# Patient Record
Sex: FEMALE | Race: BLACK OR AFRICAN AMERICAN | NOT HISPANIC OR LATINO | Employment: FULL TIME | URBAN - METROPOLITAN AREA
[De-identification: names, ages, dates, MRNs, and addresses within clinical notes are randomized per-mention and may not be internally consistent; named-entity substitution may affect disease eponyms.]

---

## 2017-04-14 ENCOUNTER — DOCUMENTATION (OUTPATIENT)
Dept: OCCUPATIONAL THERAPY | Facility: HOSPITAL | Age: 52
End: 2017-04-14

## 2017-04-14 DIAGNOSIS — I97.2 POST-MASTECTOMY LYMPHEDEMA SYNDROME: Primary | ICD-10-CM

## 2017-04-14 NOTE — THERAPY DISCHARGE NOTE
"Outpatient Occupational Therapy Lymphedema Treatment Note/Discharge Summary       Patient Name: Santos Witt  : 1965  MRN: [id number]  Today's Date: 2017      Visit Date: 2017    Patient Active Problem List   Diagnosis   • Malignant neoplasm of breast (female)        Past Medical History:   Diagnosis Date   • Anemia     History of anemia   • Cancer     Breast cancer, T2NM0   • Heart murmur         Past Surgical History:   Procedure Laterality Date   • BREAST CYST EXCISION Left     Fibroid cyst left nipple   • HYSTERECTOMY  2011   • KNEE ARTHROSCOPY Left    • PELVIC LAPAROSCOPY      cysts removed         Visit Dx:      ICD-10-CM ICD-9-CM   1. 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  -RE     STG 1 Progress Not Met  -RE     STG 2 Patient will tolerate wearing compression during all waking hours to maintain/decrease her edema.   -RE     STG 2 Progress Not Met  -RE     STG 3 Pt will demonstate understanding of use of compression sleeve for exercise and air travel.   -RE     STG 3 Progress Not Met  -RE     Long Term Goals    LTG 1 Patient independent and compliant with compression garments and night compression  as indicated for self-management of edema flare ups.   -RE     LTG 1 Progress Not Met  -RE     LTG 2 Patient demonstrate proper awareness of What is Lymphedema and \"Do's & Don'ts\" for improved prevention, management, care of symptoms and ease of transition to self-care of condition.   -RE     LTG 2 Progress Not Met  -RE       User Key  (r) = Recorded By, (t) = Taken By, (c) = Cosigned By    Initials Name Provider Type    KAMRAN Myers Occupational Therapist                      Time Calculation:                       OP OT Discharge Summary  Date " of Discharge: 04/14/17  Reason for Discharge: other (comment) (Patient did not return for follow-up.)  Outcomes Achieved: Other  Discharge Destination: Home without follow-up      KAMRAN Jessica  4/14/2017

## 2017-07-07 ENCOUNTER — LAB (OUTPATIENT)
Dept: LAB | Facility: HOSPITAL | Age: 52
End: 2017-07-07

## 2017-07-07 ENCOUNTER — OFFICE VISIT (OUTPATIENT)
Dept: ONCOLOGY | Facility: CLINIC | Age: 52
End: 2017-07-07

## 2017-07-07 VITALS
HEIGHT: 63 IN | TEMPERATURE: 98.5 F | HEART RATE: 76 BPM | BODY MASS INDEX: 27.75 KG/M2 | OXYGEN SATURATION: 98 % | SYSTOLIC BLOOD PRESSURE: 120 MMHG | WEIGHT: 156.6 LBS | RESPIRATION RATE: 14 BRPM | DIASTOLIC BLOOD PRESSURE: 80 MMHG

## 2017-07-07 DIAGNOSIS — C50.412 MALIGNANT NEOPLASM OF UPPER-OUTER QUADRANT OF LEFT FEMALE BREAST (HCC): Primary | ICD-10-CM

## 2017-07-07 LAB
ALBUMIN SERPL-MCNC: 4.3 G/DL (ref 3.5–5.2)
ALBUMIN/GLOB SERPL: 1.2 G/DL (ref 1.1–2.4)
ALP SERPL-CCNC: 65 U/L (ref 38–116)
ALT SERPL W P-5'-P-CCNC: 16 U/L (ref 0–33)
ANION GAP SERPL CALCULATED.3IONS-SCNC: 14.6 MMOL/L
AST SERPL-CCNC: 23 U/L (ref 0–32)
BASOPHILS # BLD AUTO: 0.01 10*3/MM3 (ref 0–0.1)
BASOPHILS NFR BLD AUTO: 0.2 % (ref 0–1.1)
BILIRUB SERPL-MCNC: 0.5 MG/DL (ref 0.1–1.2)
BUN BLD-MCNC: 25 MG/DL (ref 6–20)
BUN/CREAT SERPL: 28.4 (ref 7.3–30)
CALCIUM SPEC-SCNC: 9.1 MG/DL (ref 8.5–10.2)
CHLORIDE SERPL-SCNC: 100 MMOL/L (ref 98–107)
CO2 SERPL-SCNC: 25.4 MMOL/L (ref 22–29)
CREAT BLD-MCNC: 0.88 MG/DL (ref 0.6–1.1)
DEPRECATED RDW RBC AUTO: 44.9 FL (ref 37–49)
EOSINOPHIL # BLD AUTO: 0.05 10*3/MM3 (ref 0–0.36)
EOSINOPHIL NFR BLD AUTO: 1.2 % (ref 1–5)
ERYTHROCYTE [DISTWIDTH] IN BLOOD BY AUTOMATED COUNT: 13.9 % (ref 11.7–14.5)
GFR SERPL CREATININE-BSD FRML MDRD: 82 ML/MIN/1.73
GLOBULIN UR ELPH-MCNC: 3.6 GM/DL (ref 1.8–3.5)
GLUCOSE BLD-MCNC: 83 MG/DL (ref 74–124)
HCT VFR BLD AUTO: 38.3 % (ref 34–45)
HGB BLD-MCNC: 12.5 G/DL (ref 11.5–14.9)
HOLD SPECIMEN: NORMAL
IMM GRANULOCYTES # BLD: 0.01 10*3/MM3 (ref 0–0.03)
IMM GRANULOCYTES NFR BLD: 0.2 % (ref 0–0.5)
LYMPHOCYTES # BLD AUTO: 1.29 10*3/MM3 (ref 1–3.5)
LYMPHOCYTES NFR BLD AUTO: 31.2 % (ref 20–49)
MCH RBC QN AUTO: 28.6 PG (ref 27–33)
MCHC RBC AUTO-ENTMCNC: 32.6 G/DL (ref 32–35)
MCV RBC AUTO: 87.6 FL (ref 83–97)
MONOCYTES # BLD AUTO: 0.47 10*3/MM3 (ref 0.25–0.8)
MONOCYTES NFR BLD AUTO: 11.4 % (ref 4–12)
NEUTROPHILS # BLD AUTO: 2.3 10*3/MM3 (ref 1.5–7)
NEUTROPHILS NFR BLD AUTO: 55.8 % (ref 39–75)
NRBC BLD MANUAL-RTO: 0 /100 WBC (ref 0–0)
PLATELET # BLD AUTO: 242 10*3/MM3 (ref 150–375)
PMV BLD AUTO: 9 FL (ref 8.9–12.1)
POTASSIUM BLD-SCNC: 4.2 MMOL/L (ref 3.5–4.7)
PROT SERPL-MCNC: 7.9 G/DL (ref 6.3–8)
RBC # BLD AUTO: 4.37 10*6/MM3 (ref 3.9–5)
SODIUM BLD-SCNC: 140 MMOL/L (ref 134–145)
WBC NRBC COR # BLD: 4.13 10*3/MM3 (ref 4–10)

## 2017-07-07 PROCEDURE — 99213 OFFICE O/P EST LOW 20 MIN: CPT | Performed by: INTERNAL MEDICINE

## 2017-07-07 PROCEDURE — 85025 COMPLETE CBC W/AUTO DIFF WBC: CPT

## 2017-07-07 PROCEDURE — 80053 COMPREHEN METABOLIC PANEL: CPT

## 2017-07-07 PROCEDURE — 36415 COLL VENOUS BLD VENIPUNCTURE: CPT

## 2017-07-07 RX ORDER — LEVOTHYROXINE SODIUM 0.05 MG/1
TABLET ORAL
Refills: 3 | COMMUNITY
Start: 2017-05-25 | End: 2018-03-23 | Stop reason: SDUPTHER

## 2017-07-07 RX ORDER — VITAMIN B COMPLEX
CAPSULE ORAL DAILY
COMMUNITY

## 2017-07-07 NOTE — PROGRESS NOTES
Subjective      REASONS FOR FOLLOWUP:   1. T2N1M0, ER/TX negative, HER2 positive breast cancer.   2. BRCA 1 and 2 negative.   3. Adriamycin/Cytoxan followed by Taxol and Herceptin, started on 11/08/2013.   4. Syncopal episode after cycle #3 with stable echo, chemotherapy continued.   5. FUO after cycle 4 of chemotherapy associated with severe mouth pain, marked elevation of liver function tests and ferritin to 44,000, stable echocardiogram, stable cardiac function, ?etiology, and query drug reaction versus viral infection.   6. Taxol weekly initiated on 02/13/2014. Close followup of LFTs and cardiac function.   7. Echocardiogram 03/05/2014 stable at 63%. Treatment continued with Taxol and Herceptin.   8. Neuropathy grade 2. Taxol decreased 15% on 03/20/2014.   9. Q.3 week Herceptin started 05/23/2014 with plans for radiation consult.   10. Itching with Herceptin. Hydrocortisone and Pepcid added to care plan.   11. Patient seen 07/25/2014 with mild reduction in ejection fraction noted. Fo llowup echocardiogram scheduled prior to next visit month, Herceptin continued with hydrocortisone and Pepcid pre-medications.   12. Further drop in the ejection fraction to 53%. Because of some dizziness and near syncope, treatment with Herceptin held with planned followup in 1 month with repeat echocardiogram. MRI done for headaches was negative.   13. EF up to 64%. Herceptin resumed. Previous echocardiogram felt to be suboptimal due to lack of contrast.   14. Syncopal episode 12/19/2014 with a negative cardiac evaluation. Neurology consult recommended.   15. MRI done for headaches. Negative in August 2014.   16. CT of the abdomen done at [facility] in 10/2014 shows a liver lesion, which was indeterminate. They recommended MRI. We have compared to a previous MRI in 2014 and established this is hemangioma.     HISTORY OF PRESENT ILLNESS:     History of Present Illness patient is a 51-year-old   American female with an ER/DE negative HER-2 positive breast cancer node positive T2 N1 M0 treated with Adriamycin and Cytoxan followed by Taxol Herceptin was completed in late  .  He is here for routine follow-up and overall she is doing well she was diagnosed with asthma and  is on an inhaler with good improvement.  She has mild hot flashes but has mood swings and menopausal symptoms as reported into menopause with chemotherapy.  She has no unusual aches or pains but otherwise is doing well and working full-time.  She is not interested in reconstruction at this point.  We did discuss doing extended genetic testing because of her family history we referred her for the extended testing but she has not done that yet She did have a screening colonoscopy and this was negative.  DEXA scan was normal in April of this year.  She is still very anxious about recurrence which is understandable but getting better with time.    PAST MEDICAL HIS TORY: Fairly unremarkable except for anemia in the past when she was having heavy periods and some arthritis in her knees. She has had a heart murmur since childhood and took antibiotic prophylaxis for dental procedures and then she was told she did not need to do this any longer.     SURGICAL HISTORY: Fibroadenoma from the left breast in . Uterine fibroids removed laparoscopically in . Left knee arthroscopy in  and hysterectomy in . Ovaries were left in place.     GYN HISTORY: Menarche age 13.  0. She obviously stopped menstruating at the time of her hysterectomy in  and is having hot flashes.     HEMATOLOGIC/ONCOLOGIC HISTORY:    The  patient initially seen on 10/18/2013, a 48-year-old  female who works as a  at the [place] who has a long history of fibrocystic breast disease with multiple ultrasounds and mammograms in the past to evaluate c ysts but after her most recent mammogram, because of some  abnormalities, an ultrasound was recommended on 07/24/2013 at [facility]. The targeted ultrasound showed 3 circumscribed solid masses in the left breast at the 2 o' clock position, axillar y tail of the breast. The appearance was suggestive of intramammary lymph nodes without a definite fatty hilum which was worrisome for tumor involvement. One of the 3 masses had actually increased in size compared to 2 of the other lesions which were un changed from 2010 suggesting benign etiology. Because of the greater than 20% interval increase in size of one of these nodules since the last mammogram, ultrasound guided biopsy was recommended.   Ultrasound-guided biopsy was done on 08/01/2013 and this biopsy showed findings highly suspicious for lymph node involvement by ductal carcinoma and the patient then went to see Dr. Witt who sent her for another biopsy on 08/19/2013 and a 1.5 cm mass was seen at the 3 o' clock position of the left breast in luan tion to the 3 abnormal axillary masses which were felt to be lymph nodes. This area was biopsied on 08/19/2013 and the path report showed invasive ductal carcinoma and DCIS grade 3 with the largest focus of invasive cancer being 1 cm and DCIS was interme d iate to high grade. The left axillary lymph node was core biopsied and confirmed metastatic carcinoma. ER/OK were negative, HER2 was 3+ positive. This led to an MRI of the breast 08/27/2013 which showed 3 cm mass in the lower outer quadrant of the left breast and additional clump enhancement in the middle and anterior third of the left breast at the 3 o' clock axis suspicious for in situ carcinoma and 3 rounded masses measured up to 9 mm in greatest dimension are noted consistent with lymph nodes. One ha d a clip from the recent biopsy. Right breast showed stippled foci of variable enhancement with no dominant or clumped findings, prominent right axillary lymph nodes were noted and right axillary lymph node biopsy  was done. This was nondiagnostic.   The patient underwent bone scan on 09/09/2013 which was unremarkable except for some increased activity at T9 and the right part of L3 which was again felt to be nonspecific but plain films were recommended. CT scan of the chest, abdomen and pelvis was done which was also unremarkable except for prominent intramammary nodes in the upper outer left breast and several prominent axillary lymph nodes bilaterally one of which has been biopsied on the right. Appearance raised the concern of possible metastatic to both axillae but there were no other sites of involvement. Bone density dated 09/20/2013 was normal. Patient then went for surgery on 09/25/2013 at which time she underwent right mastectomy which was unremarkable and left total mastectomy with axillary dissection which showed a 2.9 cm grade 3 infiltrating ductal carcinoma with associated DCIS, margins were uninvolved. Three of 21 lymph nodes showed metastatic disease making this a pT2N1.   The patient has done well from surgery but has had a lot of prob lems with anxiety and hot flashes but otherwise has done well. She went to see Dr. Cedeno for recommendations and then came to see us for further discussion of treatment options. In addition, the patient had BRCA testing which thankfully was negative.   T he patient had a MUGA scan, which showed an ejection fraction of 50% and no other abnormalities. Her plain films of the abnormal spots on her bone scan were negative. She had port placed and is ready to start treatment. 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   When she had CT scans of the chest, abdomen and pelvis, they were unremarkable except for hemangioma in the liver and prominent intramammary nodes in the upper outer quadrant  of the left breast with several prominent axillary nodes bilaterally.   The patient started dose-dense AC with fairly good tolerance except for some mucositis after cycle 4. She had a febrile illness that required hospitalization from 01/02/2014 - 01/15/2014 with a fever of unknown origin. Multiple viral cultures were negative including CMV, EBV, herpes, respiratory panel and hepatiti s profile was negative. Mouth multiple blood and urine cultures and throat cultures were negative except for Haemophilus parainfluenza for which a course of amoxicillin was given. During the hospitalization the patient spiked fevers continuously and then developed rapid elevation of her liver tests with a ferritin which went up as high as 44,000. The patient defervesced finally and the liver profile started coming down. Echo with bubble echo during her stay was normal with ejection fraction of 60%. The p atchrissy was discharged home with plans to resume chemotherapy cautiously once her numbers stabilized.   The patient was seen on 01/22/2014 feeling much better. She still is not very active, feeling fatigued and deconditioned from lying in bed for almost 2 we eks. The decision was made because of borderline blood pressure of 94/70 and some mild tachycardia to have her be more active and exercise for another week and reevaluate to start Herceptin on 01/30/2014 with Taxol to be added after 2 doses of Herceptin i f she is tolerating the treatment well and her liver functions are normal. A bone marrow biopsy was also done during her hospital stay and no signs of hemophagocytosis or dysplasia although there was some aberrant myeloid antigen of uncertain significanc e which has been seen with myelodysplasia. She was also seen briefly by Dr. Fatmata Tolbert of Rheumatology while in the hospital for low positive ARTEMIO of 1:160 and anti-SSA antibody greater than 8 but Dr. Tolbert felt this was unlikely to be rheumatologica l disease because 4 doses  of Cytoxan should not have precipitated this.   The patient initiated weekly Herceptin for 2 weeks with no significant problems. Taxol weekly initiated on 02/13/2014 with close followup of LFTs and cardiac function.   The patient was seen on 03/06/2014 with echo showing EF of 63%. We will continue Taxol and Herceptin weekly and watch her LFTs closely. Neurontin causes dizziness and she is not taking it. We will hold off on Effexor for the time being.   Patient required Neupogen for 3 doses because of an ANC of 1.06 with dose 4 and Neupogen x3 is added for each dose.   The patient is seen 03/20/2014 doing fairly well. Taxol dose decreased to 15% because of neuropathy. Neupogen continued.   The patient was seen on 05/23/2014 doing well. Herceptin continued with plans to check an echocardiogram before her next treatment in 3 weeks.   MRI of the liver confirmed hemangioma. Echocardiogram is stable at 59%. Herceptin continued at 3-week intervals with the addition of Pepcid and Solu-Cortef 50 mg and the patient is almost certainly going to agree to radiation for her 3 node status, especially since the pathologist at [facility] thought there was extranodal extension.   The patient was seen 07/25/2014. Echocardiogram results noted, slightly reduced from previous, the patient proceeding through radiation therapy of left chest wall. Followup echocardiogram scheduled after her Herceptin therapy 07/25/2014.   Patient was seen on 08/14/2014 with EF of 53%. I discussed th e case with Dr. Bebe Fernandez and did not use the definitive contrast and this may explain the differences, but based on the fact that she had some near syncope on one occasion this weekend and the fact that she has had radiation to the left chest area with a significant skin burn, we will hold Herceptin today and bring her back in 4 weeks, but check her LFTs and ferritin again today.   The patient was 11/14/2014 doing well with ejection fraction of 63%  and echocardiogram was reviewed. Herceptin will be continued until mid-January.   The patient is seen 2014. She had a syncopal episode 2014. ER workup was negative and neurological evaluation was ordered. Bone scan ordered for right hip pain. If this is negative she will have her port removed in J anuary and she is planning to delay reconstruction for a year.   The patient was evaluated for some pain in her back with a bone scan which was essentially unremarkable except for some degenerative changes at L3, and T6-7-8. Because we were concerned, we ordered an MRI of the thoracic and lumbar spine which was benign with just DJD. No further followup is needed at this time with regard to scans.   Patient seen on 2015, doing well; occasional migraine-type headache persists. Port has been removed. She is thinking about reconstruction sometime at the end of the year.   CT of the abdomen done at [facility] in 10/2014 shows a liver lesion, which was indeterminate. They recommended MRI. We have compared to a previous MRI in  and established this is hemangioma.   Patient seen on 2015 having had CT scans of the chest and abdo men at [facility] for some abdominal discomfort that showed a 3 cm lesion in the dome of the liver, which they were worried about and recommended an MRI, but we reviewed the scans and also compared to the previous MRI of the liver d one in 2014 in October and confirmed that this indeed was a hemangioma that has been stable since . An echo was also done which showed an ejection fraction of 60% and stable.       SOCIAL HISTORY: Single. . She does not smoke. Drinks very little. No drug history.     FAMILY HISTORY: Both of her parents are in their 60s. Mother had kidney cancer and had a partial kidney resection. Brother  of head trauma, another is healthy. She has two sisters who are healthy. She has 2 paternal  "aunts with breast cancer; one is alive in her 70s, the other  at 48 of breast cancer. A paternal uncle with kidney cancer. Paternal grandfather with colon cancer in his 60s. Multiple paternal cousins with cancer inc luding leukemia and kidney cancer. No other history of breast or ovarian cancer in the family.     Review of Systems   Constitutional: Positive for fatigue.   Psychiatric/Behavioral: The patient is nervous/anxious.       A comprehensive 14 point review of systems was performed and was negative except as mentioned.    Medications:  The current medication list was reviewed in the EMR    ALLERGIES:    Allergies   Allergen Reactions   • Amoxicillin Nausea And Vomiting   • Ciprofloxacin    • Sulfa Antibiotics        Objective      Vitals:    17 1011   BP: 120/80   Pulse: 76   Resp: 14   Temp: 98.5 °F (36.9 °C)   TempSrc: Oral   SpO2: 98%   Weight: 156 lb 9.6 oz (71 kg)   Height: 63.27\" (160.7 cm)  Comment: new ht   PainSc: 0-No pain     Current Status 2017   ECOG score 0       Physical Exam    GENERAL:  Well-developed, well-nourished in no acute distress.   SKIN:  Warm, dry without rashes, purpura or petechiae.  HEAD:  Normocephalic.  EYES:  Pupils equal, round and reactive to light.  EOMs intact.  Conjunctivae normal.  EARS:  Hearing intact.  NOSE:  Septum midline.  No excoriations or nasal discharge.  MOUTH:  Tongue is well-papillated; no stomatitis or ulcers.  Lips normal.  THROAT:  Oropharynx without lesions or exudates.  NECK:  Supple with good range of motion; no thyromegaly or masses, no JVD.  LYMPHATICS:  No cervical, supraclavicular, axillary or inguinal adenopathy.  CHEST:  Lungs clear to percussion and auscultation. Good airflow.  BREASTS: Bilateral chest wall is benign with no axillary adenopathy  CARDIAC:  Regular rate and rhythm without murmurs, rubs or gallops. Normal S1,S2.  ABDOMEN:  Soft, nontender with no organomegaly or masses.  EXTREMITIES:  No clubbing, cyanosis or " edema.  NEUROLOGICAL:  Cranial Nerves II-XII grossly intact.  No focal neurological deficits.  PSYCHIATRIC:  Normal affect and mood.        RECENT LABS:  Hematology WBC   Date Value Ref Range Status   07/07/2017 4.13 4.00 - 10.00 10*3/mm3 Final     RBC   Date Value Ref Range Status   07/07/2017 4.37 3.90 - 5.00 10*6/mm3 Final     Hemoglobin   Date Value Ref Range Status   07/07/2017 12.5 11.5 - 14.9 g/dL Final     Hematocrit   Date Value Ref Range Status   07/07/2017 38.3 34.0 - 45.0 % Final     Platelets   Date Value Ref Range Status   07/07/2017 242 150 - 375 10*3/mm3 Final      Assessment/Plan   1.  T2 N1 ER/IA negative HER-2 positive breast cancer treated with Adriamycin Cytoxan Taxol Herceptin and bilateral mastectomies plus radiation  2.  BRCA negative  3.  Hemangioma of the liver  4.  family history of malignancy  5.  Mild chronic anemia      Plan return in 6 months for follow-up she will call for any other problems in the interim.              7/7/2017      CC:

## 2017-08-14 DIAGNOSIS — C50.412 BILATERAL MALIGNANT NEOPLASM OF UPPER OUTER QUADRANT OF BREAST IN FEMALE (HCC): Primary | ICD-10-CM

## 2017-08-14 DIAGNOSIS — C50.411 BILATERAL MALIGNANT NEOPLASM OF UPPER OUTER QUADRANT OF BREAST IN FEMALE (HCC): Primary | ICD-10-CM

## 2017-10-20 ENCOUNTER — TELEPHONE (OUTPATIENT)
Dept: RADIATION ONCOLOGY | Facility: HOSPITAL | Age: 52
End: 2017-10-20

## 2017-10-20 NOTE — TELEPHONE ENCOUNTER
Ms iWtt calls to say she was treated at [facility] with radiation but her doctor there is now gone and she is calling Dr Longoria because she was seen in consultation by her in 2014. She now complains of tingling in her chest and questions if this is normal. I advised her since we did not treat her we do not have records and she would need to either see her PMD for evaluation or contact [facility] where she was treated and be evaluated by one of the other radiation doctors. She voiced understanding and agreed.

## 2017-12-22 ENCOUNTER — LAB (OUTPATIENT)
Dept: LAB | Facility: HOSPITAL | Age: 52
End: 2017-12-22

## 2017-12-22 ENCOUNTER — OFFICE VISIT (OUTPATIENT)
Dept: ONCOLOGY | Facility: CLINIC | Age: 52
End: 2017-12-22

## 2017-12-22 VITALS
HEART RATE: 72 BPM | TEMPERATURE: 98 F | WEIGHT: 162.4 LBS | SYSTOLIC BLOOD PRESSURE: 132 MMHG | BODY MASS INDEX: 28.77 KG/M2 | OXYGEN SATURATION: 99 % | RESPIRATION RATE: 12 BRPM | DIASTOLIC BLOOD PRESSURE: 80 MMHG | HEIGHT: 63 IN

## 2017-12-22 DIAGNOSIS — Z17.1 MALIGNANT NEOPLASM OF UPPER-OUTER QUADRANT OF BOTH BREASTS IN FEMALE, ESTROGEN RECEPTOR NEGATIVE (HCC): Primary | ICD-10-CM

## 2017-12-22 DIAGNOSIS — C50.412 BILATERAL MALIGNANT NEOPLASM OF UPPER OUTER QUADRANT OF BREAST IN FEMALE, UNSPECIFIED ESTROGEN RECEPTOR STATUS (HCC): Primary | ICD-10-CM

## 2017-12-22 DIAGNOSIS — C50.412 MALIGNANT NEOPLASM OF UPPER-OUTER QUADRANT OF LEFT FEMALE BREAST (HCC): ICD-10-CM

## 2017-12-22 DIAGNOSIS — C50.411 BILATERAL MALIGNANT NEOPLASM OF UPPER OUTER QUADRANT OF BREAST IN FEMALE, UNSPECIFIED ESTROGEN RECEPTOR STATUS (HCC): Primary | ICD-10-CM

## 2017-12-22 DIAGNOSIS — C50.411 MALIGNANT NEOPLASM OF UPPER-OUTER QUADRANT OF BOTH BREASTS IN FEMALE, ESTROGEN RECEPTOR NEGATIVE (HCC): Primary | ICD-10-CM

## 2017-12-22 DIAGNOSIS — C50.412 MALIGNANT NEOPLASM OF UPPER-OUTER QUADRANT OF BOTH BREASTS IN FEMALE, ESTROGEN RECEPTOR NEGATIVE (HCC): Primary | ICD-10-CM

## 2017-12-22 LAB
ALBUMIN SERPL-MCNC: 4.1 G/DL (ref 3.5–5.2)
ALBUMIN/GLOB SERPL: 1.2 G/DL (ref 1.1–2.4)
ALP SERPL-CCNC: 62 U/L (ref 38–116)
ALT SERPL W P-5'-P-CCNC: 15 U/L (ref 0–33)
ANION GAP SERPL CALCULATED.3IONS-SCNC: 9 MMOL/L
AST SERPL-CCNC: 22 U/L (ref 0–32)
BASOPHILS # BLD AUTO: 0.01 10*3/MM3 (ref 0–0.1)
BASOPHILS NFR BLD AUTO: 0.2 % (ref 0–1.1)
BILIRUB SERPL-MCNC: 0.4 MG/DL (ref 0.1–1.2)
BUN BLD-MCNC: 21 MG/DL (ref 6–20)
BUN/CREAT SERPL: 25.3 (ref 7.3–30)
CALCIUM SPEC-SCNC: 9.1 MG/DL (ref 8.5–10.2)
CHLORIDE SERPL-SCNC: 103 MMOL/L (ref 98–107)
CO2 SERPL-SCNC: 28 MMOL/L (ref 22–29)
CREAT BLD-MCNC: 0.83 MG/DL (ref 0.6–1.1)
DEPRECATED RDW RBC AUTO: 43.6 FL (ref 37–49)
EOSINOPHIL # BLD AUTO: 0.09 10*3/MM3 (ref 0–0.36)
EOSINOPHIL NFR BLD AUTO: 1.9 % (ref 1–5)
ERYTHROCYTE [DISTWIDTH] IN BLOOD BY AUTOMATED COUNT: 13.4 % (ref 11.7–14.5)
GFR SERPL CREATININE-BSD FRML MDRD: 87 ML/MIN/1.73
GLOBULIN UR ELPH-MCNC: 3.4 GM/DL (ref 1.8–3.5)
GLUCOSE BLD-MCNC: 80 MG/DL (ref 74–124)
HCT VFR BLD AUTO: 36.2 % (ref 34–45)
HGB BLD-MCNC: 11.7 G/DL (ref 11.5–14.9)
HOLD SPECIMEN: NORMAL
IMM GRANULOCYTES # BLD: 0.01 10*3/MM3 (ref 0–0.03)
IMM GRANULOCYTES NFR BLD: 0.2 % (ref 0–0.5)
LYMPHOCYTES # BLD AUTO: 1.52 10*3/MM3 (ref 1–3.5)
LYMPHOCYTES NFR BLD AUTO: 32 % (ref 20–49)
MCH RBC QN AUTO: 28.5 PG (ref 27–33)
MCHC RBC AUTO-ENTMCNC: 32.3 G/DL (ref 32–35)
MCV RBC AUTO: 88.1 FL (ref 83–97)
MONOCYTES # BLD AUTO: 0.5 10*3/MM3 (ref 0.25–0.8)
MONOCYTES NFR BLD AUTO: 10.5 % (ref 4–12)
NEUTROPHILS # BLD AUTO: 2.62 10*3/MM3 (ref 1.5–7)
NEUTROPHILS NFR BLD AUTO: 55.2 % (ref 39–75)
NRBC BLD MANUAL-RTO: 0 /100 WBC (ref 0–0)
PLATELET # BLD AUTO: 215 10*3/MM3 (ref 150–375)
PMV BLD AUTO: 9.5 FL (ref 8.9–12.1)
POTASSIUM BLD-SCNC: 4.3 MMOL/L (ref 3.5–4.7)
PROT SERPL-MCNC: 7.5 G/DL (ref 6.3–8)
RBC # BLD AUTO: 4.11 10*6/MM3 (ref 3.9–5)
SODIUM BLD-SCNC: 140 MMOL/L (ref 134–145)
WBC NRBC COR # BLD: 4.75 10*3/MM3 (ref 4–10)

## 2017-12-22 PROCEDURE — 99213 OFFICE O/P EST LOW 20 MIN: CPT | Performed by: INTERNAL MEDICINE

## 2017-12-22 PROCEDURE — 36415 COLL VENOUS BLD VENIPUNCTURE: CPT | Performed by: INTERNAL MEDICINE

## 2017-12-22 PROCEDURE — 80053 COMPREHEN METABOLIC PANEL: CPT | Performed by: INTERNAL MEDICINE

## 2017-12-22 PROCEDURE — 85025 COMPLETE CBC W/AUTO DIFF WBC: CPT | Performed by: INTERNAL MEDICINE

## 2017-12-22 RX ORDER — LEVOTHYROXINE SODIUM 0.07 MG/1
75 TABLET ORAL
COMMUNITY
Start: 2017-11-28 | End: 2018-06-08

## 2017-12-22 NOTE — PROGRESS NOTES
Subjective      REASONS FOR FOLLOWUP:   1. T2N1M0, ER/GA negative, HER2 positive breast cancer.   2. BRCA 1 and 2 negative.   3. Adriamycin/Cytoxan followed by Taxol and Herceptin, started on 11/08/2013.   4. Syncopal episode after cycle #3 with stable echo, chemotherapy continued.   5. FUO after cycle 4 of chemotherapy associated with severe mouth pain, marked elevation of liver function tests and ferritin to 44,000, stable echocardiogram, stable cardiac function, ?etiology, and query drug reaction versus viral infection.   6. Taxol weekly initiated on 02/13/2014. Close followup of LFTs and cardiac function.   7. Echocardiogram 03/05/2014 stable at 63%. Treatment continued with Taxol and Herceptin.   8. Neuropathy grade 2. Taxol decreased 15% on 03/20/2014.   9. Q.3 week Herceptin started 05/23/2014 with plans for radiation consult.   10. Itching with Herceptin. Hydrocortisone and Pepcid added to care plan.   11. Patient seen 07/25/2014 with mild reduction in ejection fraction noted. Fo llowup echocardiogram scheduled prior to next visit month, Herceptin continued with hydrocortisone and Pepcid pre-medications.   12. Further drop in the ejection fraction to 53%. Because of some dizziness and near syncope, treatment with Herceptin held with planned followup in 1 month with repeat echocardiogram. MRI done for headaches was negative.   13. EF up to 64%. Herceptin resumed. Previous echocardiogram felt to be suboptimal due to lack of contrast.   14. Syncopal episode 12/19/2014 with a negative cardiac evaluation. Neurology consult recommended.   15. MRI done for headaches. Negative in August 2014.   16. CT of the abdomen done at [facility] in 10/2014 shows a liver lesion, which was indeterminate. They recommended MRI. We have compared to a previous MRI in 2014 and established this is hemangioma.       History of Present Illness patient is a 52-year-old lady with a node positive HER-2 positive ER  negative breast cancer 4 years from completion of adjuvant chemotherapy.  She comes in today for follow-up and overall she is doing well but is having a lot of troubles with anxiety and tearfulness because 2 of her friends  recently from metastatic cancer in one of them actually had breast cancer.  She is struggling with the whole issue of her mortality and I tried to reassure her that she is doing well 4 years out but she is still tearful.  She stopped her Effexor because it causes some unusual reactions.  She states she has tried other medicines without success.  I suggested a trial of Zyprexa and told her to talk to her family doctor about this  She is up-to-date with colonoscopies.    She did have another syncopal episode last week and they're working her up through her family doctor's office.  This does not sound like a cancer related problem but more a neuro or cardiac issue      Active Ambulatory Problems     Diagnosis Date Noted   • Bilateral malignant neoplasm of upper outer quadrant of breast in female 04/15/2016     Resolved Ambulatory Problems     Diagnosis Date Noted   • No Resolved Ambulatory Problems     Past Medical History:   Diagnosis Date   • Anemia    • Cancer    • Heart murmur      SURGICAL HISTORY: Fibroadenoma from the left breast in . Uterine fibroids removed laparoscopically in . Left knee arthroscopy in  and hysterectomy in . Ovaries were left in place.     GYN HISTORY: Menarche age 13.  0. She obviously stopped menstruating at the time of her hysterectomy in  and is having hot flashes.     HEMATOLOGIC/ONCOLOGIC HISTORY:    The  patient initially seen on 10/18/2013, a 48-year-old  female who works as a  at the [place] who has a long history of fibrocystic breast disease with multiple ultrasounds and mammograms in the past to evaluate c ysts but after her most recent mammogram, because of some abnormalities, an  ultrasound was recommended on 07/24/2013 at [facility]. The targeted ultrasound showed 3 circumscribed solid masses in the left breast at the 2 o' clock position, axillar y tail of the breast. The appearance was suggestive of intramammary lymph nodes without a definite fatty hilum which was worrisome for tumor involvement. One of the 3 masses had actually increased in size compared to 2 of the other lesions which were un changed from 2010 suggesting benign etiology. Because of the greater than 20% interval increase in size of one of these nodules since the last mammogram, ultrasound guided biopsy was recommended.   Ultrasound-guided biopsy was done on 08/01/2013 and this biopsy showed findings highly suspicious for lymph node involvement by ductal carcinoma and the patient then went to see Dr. Witt who sent her for another biopsy on 08/19/2013 and a 1.5 cm mass was seen at the 3 o' clock position of the left breast in luan tion to the 3 abnormal axillary masses which were felt to be lymph nodes. This area was biopsied on 08/19/2013 and the path report showed invasive ductal carcinoma and DCIS grade 3 with the largest focus of invasive cancer being 1 cm and DCIS was interme d iate to high grade. The left axillary lymph node was core biopsied and confirmed metastatic carcinoma. ER/AL were negative, HER2 was 3+ positive. This led to an MRI of the breast 08/27/2013 which showed 3 cm mass in the lower outer quadrant of the left breast and additional clump enhancement in the middle and anterior third of the left breast at the 3 o' clock axis suspicious for in situ carcinoma and 3 rounded masses measured up to 9 mm in greatest dimension are noted consistent with lymph nodes. One ha d a clip from the recent biopsy. Right breast showed stippled foci of variable enhancement with no dominant or clumped findings, prominent right axillary lymph nodes were noted and right axillary lymph node biopsy was done. This was  nondiagnostic.   The patient underwent bone scan on 09/09/2013 which was unremarkable except for some increased activity at T9 and the right part of L3 which was again felt to be nonspecific but plain films were recommended. CT scan of the chest, abdomen and pelvis was done which was also unremarkable except for prominent intramammary nodes in the upper outer left breast and several prominent axillary lymph nodes bilaterally one of which has been biopsied on the right. Appearance raised the concern of possible metastatic to both axillae but there were no other sites of involvement. Bone density dated 09/20/2013 was normal. Patient then went for surgery on 09/25/2013 at which time she underwent right mastectomy which was unremarkable and left total mastectomy with axillary dissection which showed a 2.9 cm grade 3 infiltrating ductal carcinoma with associated DCIS, margins were uninvolved. Three of 21 lymph nodes showed metastatic disease making this a pT2N1.   The patient has done well from surgery but has had a lot of prob lems with anxiety and hot flashes but otherwise has done well. She went to see Dr. Cedeno for recommendations and then came to see us for further discussion of treatment options. In addition, the patient had BRCA testing which thankfully was negative.   T he patient had a MUGA scan, which showed an ejection fraction of 50% and no other abnormalities. Her plain films of the abnormal spots on her bone scan were negative. She had port placed and is ready to start treatment. 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   When she had CT scans of the chest, abdomen and pelvis, they were unremarkable except for hemangioma in the liver and prominent intramammary nodes in the upper outer quadrant of the left breast  with several prominent axillary nodes bilaterally.   The patient started dose-dense AC with fairly good tolerance except for some mucositis after cycle 4. She had a febrile illness that required hospitalization from 01/02/2014 - 01/15/2014 with a fever of unknown origin. Multiple viral cultures were negative including CMV, EBV, herpes, respiratory panel and hepatiti s profile was negative. Mouth multiple blood and urine cultures and throat cultures were negative except for Haemophilus parainfluenza for which a course of amoxicillin was given. During the hospitalization the patient spiked fevers continuously and then developed rapid elevation of her liver tests with a ferritin which went up as high as 44,000. The patient defervesced finally and the liver profile started coming down. Echo with bubble echo during her stay was normal with ejection fraction of 60%. The p atchrissy was discharged home with plans to resume chemotherapy cautiously once her numbers stabilized.   The patient was seen on 01/22/2014 feeling much better. She still is not very active, feeling fatigued and deconditioned from lying in bed for almost 2 we eks. The decision was made because of borderline blood pressure of 94/70 and some mild tachycardia to have her be more active and exercise for another week and reevaluate to start Herceptin on 01/30/2014 with Taxol to be added after 2 doses of Herceptin i f she is tolerating the treatment well and her liver functions are normal. A bone marrow biopsy was also done during her hospital stay and no signs of hemophagocytosis or dysplasia although there was some aberrant myeloid antigen of uncertain significanc e which has been seen with myelodysplasia. She was also seen briefly by Dr. Fatmata Tolbert of Rheumatology while in the hospital for low positive ARTEMIO of 1:160 and anti-SSA antibody greater than 8 but Dr. Tolbert felt this was unlikely to be rheumatologica l disease because 4 doses of Cytoxan should  not have precipitated this.   The patient initiated weekly Herceptin for 2 weeks with no significant problems. Taxol weekly initiated on 02/13/2014 with close followup of LFTs and cardiac function.   The patient was seen on 03/06/2014 with echo showing EF of 63%. We will continue Taxol and Herceptin weekly and watch her LFTs closely. Neurontin causes dizziness and she is not taking it. We will hold off on Effexor for the time being.   Patient required Neupogen for 3 doses because of an ANC of 1.06 with dose 4 and Neupogen x3 is added for each dose.   The patient is seen 03/20/2014 doing fairly well. Taxol dose decreased to 15% because of neuropathy. Neupogen continued.   The patient was seen on 05/23/2014 doing well. Herceptin continued with plans to check an echocardiogram before her next treatment in 3 weeks.   MRI of the liver confirmed hemangioma. Echocardiogram is stable at 59%. Herceptin continued at 3-week intervals with the addition of Pepcid and Solu-Cortef 50 mg and the patient is almost certainly going to agree to radiation for her 3 node status, especially since the pathologist at [facility] thought there was extranodal extension.   The patient was seen 07/25/2014. Echocardiogram results noted, slightly reduced from previous, the patient proceeding through radiation therapy of left chest wall. Followup echocardiogram scheduled after her Herceptin therapy 07/25/2014.   Patient was seen on 08/14/2014 with EF of 53%. I discussed th e case with Dr. Bebe Fernandez and did not use the definitive contrast and this may explain the differences, but based on the fact that she had some near syncope on one occasion this weekend and the fact that she has had radiation to the left chest area with a significant skin burn, we will hold Herceptin today and bring her back in 4 weeks, but check her LFTs and ferritin again today.   The patient was 11/14/2014 doing well with ejection fraction of 63% and  echocardiogram was reviewed. Herceptin will be continued until mid-January.   The patient is seen 12/29/2014. She had a syncopal episode 12/19/2014. ER workup was negative and neurological evaluation was ordered. Bone scan ordered for right hip pain. If this is negative she will have her port removed in J anuary and she is planning to delay reconstruction for a year.   The patient was evaluated for some pain in her back with a bone scan which was essentially unremarkable except for some degenerative changes at L3, and T6-7-8. Because we were concerned, we ordered an MRI of the thoracic and lumbar spine which was benign with just DJD. No further followup is needed at this time with regard to scans.   Patient seen on 05/08/2015, doing well; occasional migraine-type headache persists. Port has been removed. She is thinking about reconstruction sometime at the end of the year.   CT of the abdomen done at [facility] in 10/2014 shows a liver lesion, which was indeterminate. They recommended MRI. We have compared to a previous MRI in 2014 and established this is hemangioma.   Patient seen on 12/18/2015 having had CT scans of the chest and abdo men at [facility] for some abdominal discomfort that showed a 3 cm lesion in the dome of the liver, which they were worried about and recommended an MRI, but we reviewed the scans and also compared to the previous MRI of the liver d one in 2014 in October and confirmed that this indeed was a hemangioma that has been stable since 2013. An echo was also done which showed an ejection fraction of 60% and stable.   7/17   patient is a 51-year-old  female with an ER/MT negative HER-2 positive breast cancer node positive T2 N1 M0 treated with Adriamycin and Cytoxan followed by Taxol Herceptin was completed in late 2014 .  He is here for routine follow-up and overall she is doing well she was diagnosed with asthma and  is on an  "inhaler with good improvement.  She has mild hot flashes but has mood swings and menopausal symptoms as reported into menopause with chemotherapy.  She has no unusual aches or pains but otherwise is doing well and working full-time.  She is not interested in reconstruction at this point.  We did discuss doing extended genetic testing because of her family history we referred her for the extended testing but she has not done that yet She did have a screening colonoscopy and this was negative.  DEXA scan was normal in April of this year.  She is still very anxious about recurrence which is understandable but getting better with time.        SOCIAL HISTORY: Single. . She does not smoke. Drinks very little. No drug history.     FAMILY HISTORY: Both of her parents are in their 60s. Mother had kidney cancer and had a partial kidney resection. Brother  of head trauma, another is healthy. She has two sisters who are healthy. She has 2 paternal aunts with breast cancer; one is alive in her 70s, the other  at 48 of breast cancer. A paternal uncle with kidney cancer. Paternal grandfather with colon cancer in his 60s. Multiple paternal cousins with cancer inc luding leukemia and kidney cancer. No other history of breast or ovarian cancer in the family.     Review of Systems   Constitutional: Positive for fatigue.   Psychiatric/Behavioral: The patient is nervous/anxious.       A comprehensive 14 point review of systems was performed and was negative except as mentioned.    Medications:  The current medication list was reviewed in the EMR    ALLERGIES:    Allergies   Allergen Reactions   • Amoxicillin Nausea And Vomiting   • Ciprofloxacin    • Sulfa Antibiotics        Objective      Vitals:    17 1000   BP: 132/80   Pulse: 72   Resp: 12   Temp: 98 °F (36.7 °C)   TempSrc: Oral   SpO2: 99%   Weight: 73.7 kg (162 lb 6.4 oz)   Height: 161 cm (63.39\")  Comment: new height with out shoes   PainSc: 0-No pain "     Current Status 12/22/2017   ECOG score 0       Physical Exam    GENERAL:  Well-developed, well-nourished in no acute distress.   SKIN:  Warm, dry without rashes, purpura or petechiae.  HEAD:  Normocephalic.  EYES:  Pupils equal, round and reactive to light.  EOMs intact.  Conjunctivae normal.  EARS:  Hearing intact.  NOSE:  Septum midline.  No excoriations or nasal discharge.  MOUTH:  Tongue is well-papillated; no stomatitis or ulcers.  Lips normal.  THROAT:  Oropharynx without lesions or exudates.  NECK:  Supple with good range of motion; no thyromegaly or masses, no JVD.  LYMPHATICS:  No cervical, supraclavicular, axillary or inguinal adenopathy.  CHEST:  Lungs clear to percussion and auscultation. Good airflow.  BREASTS: Bilateral chest wall is benign with no axillary adenopathy  CARDIAC:  Regular rate and rhythm without murmurs, rubs or gallops. Normal S1,S2.  ABDOMEN:  Soft, nontender with no organomegaly or masses.  EXTREMITIES:  No clubbing, cyanosis or edema.  NEUROLOGICAL:  Cranial Nerves II-XII grossly intact.  No focal neurological deficits.  PSYCHIATRIC:  Normal affect and mood.        RECENT LABS:  Hematology WBC   Date Value Ref Range Status   12/22/2017 4.75 4.00 - 10.00 10*3/mm3 Final     RBC   Date Value Ref Range Status   12/22/2017 4.11 3.90 - 5.00 10*6/mm3 Final     Hemoglobin   Date Value Ref Range Status   12/22/2017 11.7 11.5 - 14.9 g/dL Final     Hematocrit   Date Value Ref Range Status   12/22/2017 36.2 34.0 - 45.0 % Final     Platelets   Date Value Ref Range Status   12/22/2017 215 150 - 375 10*3/mm3 Final      Assessment/Plan   1.  T2 N1 ER/IA negative HER-2 positive breast cancer treated with Adriamycin Cytoxan Taxol Herceptin and bilateral mastectomies plus radiation 4.0Years from diagnosis  2.  BRCA negative  3.  Hemangioma of the liver  4.  family history of malignancy  5.  Mild chronic anemia  6.  Anxiety intolerant of Effexor ?  Zyprexa 2.5 mg     Plan return in 6 months for  follow-up she will call for any other problems in the interim.              12/22/2017      CC:

## 2018-01-11 ENCOUNTER — OFFICE VISIT (OUTPATIENT)
Dept: ORTHOPEDIC SURGERY | Facility: CLINIC | Age: 53
End: 2018-01-11

## 2018-01-11 VITALS — TEMPERATURE: 98.4 F | BODY MASS INDEX: 28.53 KG/M2 | WEIGHT: 161 LBS | HEIGHT: 63 IN

## 2018-01-11 DIAGNOSIS — S80.02XA CONTUSION OF LEFT KNEE, INITIAL ENCOUNTER: ICD-10-CM

## 2018-01-11 DIAGNOSIS — M25.562 ACUTE PAIN OF LEFT KNEE: Primary | ICD-10-CM

## 2018-01-11 PROBLEM — G89.29 CHRONIC PAIN OF LEFT KNEE: Status: ACTIVE | Noted: 2018-01-11

## 2018-01-11 PROCEDURE — 20610 DRAIN/INJ JOINT/BURSA W/O US: CPT | Performed by: ORTHOPAEDIC SURGERY

## 2018-01-11 PROCEDURE — 99204 OFFICE O/P NEW MOD 45 MIN: CPT | Performed by: ORTHOPAEDIC SURGERY

## 2018-01-11 PROCEDURE — 73562 X-RAY EXAM OF KNEE 3: CPT | Performed by: ORTHOPAEDIC SURGERY

## 2018-01-11 RX ORDER — METHYLPREDNISOLONE ACETATE 80 MG/ML
80 INJECTION, SUSPENSION INTRA-ARTICULAR; INTRALESIONAL; INTRAMUSCULAR; SOFT TISSUE
Status: COMPLETED | OUTPATIENT
Start: 2018-01-11 | End: 2018-01-11

## 2018-01-11 RX ORDER — LIDOCAINE HYDROCHLORIDE 10 MG/ML
2 INJECTION, SOLUTION EPIDURAL; INFILTRATION; INTRACAUDAL; PERINEURAL
Status: COMPLETED | OUTPATIENT
Start: 2018-01-11 | End: 2018-01-11

## 2018-01-11 RX ORDER — BUPIVACAINE HYDROCHLORIDE 5 MG/ML
2 INJECTION, SOLUTION EPIDURAL; INTRACAUDAL
Status: COMPLETED | OUTPATIENT
Start: 2018-01-11 | End: 2018-01-11

## 2018-01-11 RX ADMIN — BUPIVACAINE HYDROCHLORIDE 2 ML: 5 INJECTION, SOLUTION EPIDURAL; INTRACAUDAL at 12:17

## 2018-01-11 RX ADMIN — METHYLPREDNISOLONE ACETATE 80 MG: 80 INJECTION, SUSPENSION INTRA-ARTICULAR; INTRALESIONAL; INTRAMUSCULAR; SOFT TISSUE at 12:17

## 2018-01-11 RX ADMIN — LIDOCAINE HYDROCHLORIDE 2 ML: 10 INJECTION, SOLUTION EPIDURAL; INFILTRATION; INTRACAUDAL; PERINEURAL at 12:17

## 2018-01-11 NOTE — PROGRESS NOTES
Patient Name: Santos Witt   YOB: 1965  Referring Primary Care Physician: Sheridan Richardson MD      Chief Complaint:    Chief Complaint   Patient presents with   • Left Knee - Establish Care, Pain        Subjective:    HPI:   Santos Witt is a pleasant 52 y.o. year old who presents today for evaluation of   Chief Complaint   Patient presents with   • Left Knee - Establish Care, Pain   [place] slipping on water and landed on left knee and elbow.  The left knee swelled and is hurting and hard to walk .  Got worse later.  Iced and taking  daily.  Left knee scope about 12 yrs ago but no problems since.      This problem is new to this examiner.     Medications:   Home Medications:  Current Outpatient Prescriptions on File Prior to Visit   Medication Sig   • ALBUTEROL IN Inhale as needed.   • ascorbic acid (VITAMIN C) 500 MG tablet Take  by mouth.   • B Complex Vitamins (VITAMIN B COMPLEX) capsule capsule Take  by mouth Daily.   • Calcium-Magnesium-Vitamin D 400-166.7-133.3 MG-MG-UNIT tablet Take  by mouth.   • Cholecalciferol (VITAMIN D3) 5000 UNITS capsule capsule Take 5,000 Units by mouth daily.   • Evening Primrose Oil 1000 MG capsule Take  by mouth daily.   • fluticasone (FLONASE) 50 MCG/ACT nasal spray 1 spray into each nostril Daily. Administer 2 sprays in each nostril for each dose.    • Fluticasone Furoate-Vilanterol (BREO ELLIPTA IN) Inhale daily.   • GERMANIUM PO Take  by mouth Daily.   • Glucosamine-Chondroitin--400-166 MG tablet Take  by mouth.   • LECITHIN PO Take 1,200 mg by mouth Daily.   • levothyroxine (SYNTHROID, LEVOTHROID) 50 MCG tablet TAKE 1 TABLET BY MOUTH DAILY   • levothyroxine (SYNTHROID, LEVOTHROID) 75 MCG tablet Take 75 mcg by mouth.   • MAGNESIUM PO Take  by mouth Daily.   • montelukast (SINGULAIR) 10 MG tablet Take 10 mg by mouth every night.   • Multiple Vitamin (MULTI-VITAMINS PO) Take  by mouth daily.   • NON FORMULARY Daily. 2%  Iodine drops.   • Probiotic capsule Take  by mouth 2 (two) times a day.   • SELENIUM PO Take  by mouth Daily.     No current facility-administered medications on file prior to visit.      Current Medications:  Scheduled Meds:  Continuous Infusions:  No current facility-administered medications for this visit.   PRN Meds:.    I have reviewed the patient's medical history in detail and updated the computerized patient record.  Review and summarization of old records includes:    Past Medical History:   Diagnosis Date   • Anemia     History of anemia   • Cancer     Breast cancer, T2NM0   • Heart murmur         Past Surgical History:   Procedure Laterality Date   • BREAST CYST EXCISION Left 1982    Fibroid cyst left nipple   • HYSTERECTOMY  06/2011   • KNEE ARTHROSCOPY Left 2006   • PELVIC LAPAROSCOPY  2002    cysts removed        Social History     Occupational History   •       [employer]     Social History Main Topics   • Smoking status: Never Smoker   • Smokeless tobacco: Not on file   • Alcohol use Yes      Comment: Very little   • Drug use: No   • Sexual activity: Not on file      Social History     Social History Narrative        Family History   Problem Relation Age of Onset   • Kidney cancer Mother    • Heart disease Mother    • Hypertension Mother    • Hypertension Father    • No Known Problems Sister    • No Known Problems Brother    • Cancer Paternal Aunt    • Cancer Paternal Uncle    • Cancer Paternal Grandfather    • Cancer Paternal Aunt        ROS: 14 point review of systems was performed and all other systems were reviewed and are negative except for documented findings in HPI and today's encounter.     Allergies:   Allergies   Allergen Reactions   • Amoxicillin Nausea And Vomiting   • Ciprofloxacin    • Sulfa Antibiotics      Constitutional:  Denies fever, shaking or chills   Eyes:  Denies change in visual acuity   HENT:  Denies nasal congestion or sore throat   Respiratory:   Denies cough or shortness of breath   Cardiovascular:  Denies chest pain or severe LE edema   GI:  Denies abdominal pain, nausea, vomiting, bloody stools or diarrhea   Musculoskeletal:  Numbness, tingling, pain, or loss of motor function only as noted above in history of present illness.  : Denies painful urination or hematuria  Integument:  Denies rash, lesion or ulceration   Neurologic:  Denies headache or focal weakness  Endocrine:  Denies lymphadenopathy  Psych:  Denies confusion or change in mental status   Hem:  Denies active bleeding    Objective:    Physical Exam: 52 y.o. female  Body mass index is 28.17 kg/(m^2)., @WT@, @HT@  Vitals:    01/11/18 1129   Temp: 98.4 °F (36.9 °C)     Vital signs reviewed.   General Appearance:    Alert, cooperative, in no acute distress                  Eyes: conjunctiva clear  ENT: external ears and nose atraumatic  CV: no peripheral edema  Resp: normal respiratory effort  Skin: no rashes or wounds; normal turgor  Psych: mood and affect appropriate  Lymph: no nodes appreciated  Neuro: gross sensation intact  Vascular:  Palpable peripheral pulse in noted extremity  Musculoskeletal Extremities: left knee tender and swollen, buras, lig grossly stble but tender.  the cici is questionable.    Radiology:   Imaging done today and discussed at length with the patient:    Indication: pain related symptoms,  Views: 3V AP, LAT & 40 degree PA left knee(s)   Findings: arthritic change with no fx seen  Comparison views: not available      Assessment:     ICD-10-CM ICD-9-CM   1. Chronic pain of left knee M25.562 719.46    G89.29 338.29   2. Acute pain of left knee M25.562 719.46   3. Contusion of left knee, initial encounter S80.02XA 924.11        Large Joint Arthrocentesis  Date/Time: 1/11/2018 12:17 PM  Consent given by: patient  Site marked: site marked  Timeout: Immediately prior to procedure a time out was called to verify the correct patient, procedure, equipment, support staff  "and site/side marked as required   Supporting Documentation  Indications: pain and joint swelling   Procedure Details  Location: knee - L knee  Preparation: Patient was prepped and draped in the usual sterile fashion  Needle size: 22 G  Approach: anterolateral  Medications administered: 80 mg methylPREDNISolone acetate 80 MG/ML; 2 mL bupivacaine (PF) 0.5 %; 2 mL lidocaine PF 1% 1 %  Patient tolerance: patient tolerated the procedure well with no immediate complications             Plan: Biomechanics of pertinent body area discussed.  Risks, benefits, alternatives, comparisons, and complications of accepted medicines, injections, recommendations, surgical procedures, and therapies explained and education provided in laymen's terms. The patient was given the opportunity to ask questions and they were answerved to their satisfaction.   Natural history and expected course of this patient's diagnosis discussed along with evaluation of therapies. Questions answered.  Cortisone Injection for DIAGNOSTIC and THERAPUTIC purposes.  OTC analgesics as needed with dosage warning and instructions given.  Cryotherapy/brachy therapy as indicated with instructions.   Rest, ice, compression, and elevation (RICE) therapy.   Fu if not better in a month for reeval.     note: at time of documentation xray had put in a code for \"chronic knee pain\" that was erroneous.  I instructed them to change it because I could not since it was associated with the xray.  There is no chronic element of it.    1/11/2018  "

## 2018-01-24 ENCOUNTER — TELEPHONE (OUTPATIENT)
Dept: ORTHOPEDIC SURGERY | Facility: CLINIC | Age: 53
End: 2018-01-24

## 2018-01-24 DIAGNOSIS — S80.02XD CONTUSION OF LEFT KNEE, SUBSEQUENT ENCOUNTER: ICD-10-CM

## 2018-01-24 DIAGNOSIS — M25.562 CHRONIC PAIN OF LEFT KNEE: ICD-10-CM

## 2018-01-24 DIAGNOSIS — M25.562 ACUTE PAIN OF LEFT KNEE: Primary | ICD-10-CM

## 2018-01-24 DIAGNOSIS — G89.29 CHRONIC PAIN OF LEFT KNEE: ICD-10-CM

## 2018-01-24 NOTE — TELEPHONE ENCOUNTER
Please let her know that I have ordered a Left knee MRI and she will need a f/u visit to discuss results.

## 2018-01-30 ENCOUNTER — HOSPITAL ENCOUNTER (OUTPATIENT)
Dept: MRI IMAGING | Facility: HOSPITAL | Age: 53
Discharge: HOME | End: 2018-01-30
Attending: ORTHOPAEDIC SURGERY | Admitting: ORTHOPAEDIC SURGERY

## 2018-01-30 DIAGNOSIS — M25.562 ACUTE PAIN OF LEFT KNEE: ICD-10-CM

## 2018-01-30 DIAGNOSIS — S80.02XD CONTUSION OF LEFT KNEE, SUBSEQUENT ENCOUNTER: ICD-10-CM

## 2018-01-30 PROCEDURE — 73721 MRI JNT OF LWR EXTRE W/O DYE: CPT

## 2018-02-09 ENCOUNTER — OFFICE VISIT (OUTPATIENT)
Dept: ORTHOPEDIC SURGERY | Facility: CLINIC | Age: 53
End: 2018-02-09

## 2018-02-09 VITALS — WEIGHT: 161 LBS | HEIGHT: 63 IN | TEMPERATURE: 98 F | BODY MASS INDEX: 28.53 KG/M2

## 2018-02-09 DIAGNOSIS — M22.42 CHONDROMALACIA, PATELLA, LEFT: ICD-10-CM

## 2018-02-09 DIAGNOSIS — S80.02XD CONTUSION OF LEFT KNEE, SUBSEQUENT ENCOUNTER: ICD-10-CM

## 2018-02-09 DIAGNOSIS — M17.12 ARTHRITIS OF LEFT KNEE: Primary | ICD-10-CM

## 2018-02-09 PROCEDURE — 99214 OFFICE O/P EST MOD 30 MIN: CPT | Performed by: ORTHOPAEDIC SURGERY

## 2018-02-09 RX ORDER — MELOXICAM 15 MG/1
TABLET ORAL
Qty: 90 TABLET | Refills: 3 | Status: SHIPPED | OUTPATIENT
Start: 2018-02-09 | End: 2019-04-25 | Stop reason: SDUPTHER

## 2018-02-09 NOTE — PROGRESS NOTES
Patient Name: Santos Witt   YOB: 1965  Referring Primary Care Physician: Sheridan Richardson MD      Chief Complaint:    Chief Complaint   Patient presents with   • Left Knee - Follow-up, Pain        Subjective:    HPI:   Santos Witt is a pleasant 52 y.o. year old who presents today for evaluation of   Chief Complaint   Patient presents with   • Left Knee - Follow-up, Pain   knee a little better after fall at [place] 3 weeks ago.  Sore and stiff.  Brian helped some.  otc nsaids bothered some so stopped. No locking    This problem is not new to this examiner.     Medications:   Home Medications:  Current Outpatient Prescriptions on File Prior to Visit   Medication Sig   • ALBUTEROL IN Inhale as needed.   • ascorbic acid (VITAMIN C) 500 MG tablet Take  by mouth.   • B Complex Vitamins (VITAMIN B COMPLEX) capsule capsule Take  by mouth Daily.   • Calcium-Magnesium-Vitamin D 400-166.7-133.3 MG-MG-UNIT tablet Take  by mouth.   • Cholecalciferol (VITAMIN D3) 5000 UNITS capsule capsule Take 5,000 Units by mouth daily.   • Evening Primrose Oil 1000 MG capsule Take  by mouth daily.   • fluticasone (FLONASE) 50 MCG/ACT nasal spray 1 spray into each nostril Daily. Administer 2 sprays in each nostril for each dose.    • Fluticasone Furoate-Vilanterol (BREO ELLIPTA IN) Inhale daily.   • GERMANIUM PO Take  by mouth Daily.   • Glucosamine-Chondroitin--400-166 MG tablet Take  by mouth.   • LECITHIN PO Take 1,200 mg by mouth Daily.   • levothyroxine (SYNTHROID, LEVOTHROID) 50 MCG tablet TAKE 1 TABLET BY MOUTH DAILY   • levothyroxine (SYNTHROID, LEVOTHROID) 75 MCG tablet Take 75 mcg by mouth.   • MAGNESIUM PO Take  by mouth Daily.   • montelukast (SINGULAIR) 10 MG tablet Take 10 mg by mouth every night.   • Multiple Vitamin (MULTI-VITAMINS PO) Take  by mouth daily.   • NON FORMULARY Daily. 2% Iodine drops.   • Probiotic capsule Take  by mouth 2 (two) times a day.   • SELENIUM PO Take  by mouth Daily.      No current facility-administered medications on file prior to visit.      Current Medications:  Scheduled Meds:  Continuous Infusions:  No current facility-administered medications for this visit.   PRN Meds:.    I have reviewed the patient's medical history in detail and updated the computerized patient record.  Review and summarization of old records includes:    Past Medical History:   Diagnosis Date   • Anemia     History of anemia   • Cancer     Breast cancer, T2NM0   • Heart murmur         Past Surgical History:   Procedure Laterality Date   • BREAST CYST EXCISION Left 1982    Fibroid cyst left nipple   • HYSTERECTOMY  06/2011   • KNEE ARTHROSCOPY Left 2006   • PELVIC LAPAROSCOPY  2002    cysts removed        Social History     Occupational History   •       [employer]     Social History Main Topics   • Smoking status: Never Smoker   • Smokeless tobacco: Not on file   • Alcohol use Yes      Comment: Very little   • Drug use: No   • Sexual activity: Not on file    Social History     Social History Narrative        Family History   Problem Relation Age of Onset   • Kidney cancer Mother    • Heart disease Mother    • Hypertension Mother    • Hypertension Father    • No Known Problems Sister    • No Known Problems Brother    • Cancer Paternal Aunt    • Cancer Paternal Uncle    • Cancer Paternal Grandfather    • Cancer Paternal Aunt        ROS: 14 point review of systems was performed and all other systems were reviewed and are negative except for documented findings in HPI and today's encounter.     Allergies:   Allergies   Allergen Reactions   • Amoxicillin Nausea And Vomiting   • Ciprofloxacin    • Sulfa Antibiotics      Constitutional:  Denies fever, shaking or chills   Eyes:  Denies change in visual acuity   HENT:  Denies nasal congestion or sore throat   Respiratory:  Denies cough or shortness of breath   Cardiovascular:  Denies chest pain or severe LE edema   GI:  Denies  abdominal pain, nausea, vomiting, bloody stools or diarrhea   Musculoskeletal:  Numbness, tingling, pain, or loss of motor function only as noted above in history of present illness.  : Denies painful urination or hematuria  Integument:  Denies rash, lesion or ulceration   Neurologic:  Denies headache or focal weakness  Endocrine:  Denies lymphadenopathy  Psych:  Denies confusion or change in mental status   Hem:  Denies active bleeding    Objective:    Physical Exam: 52 y.o. female  Body mass index is 28.17 kg/(m^2)., @WT@, @HT@  Vitals:    02/09/18 0907   Temp: 98 °F (36.7 °C)     Vital signs reviewed.   General Appearance:    Alert, cooperative, in no acute distress                  Eyes: conjunctiva clear  ENT: external ears and nose atraumatic  CV: no peripheral edema  Resp: normal respiratory effort  Skin: no rashes or wounds; normal turgor  Psych: mood and affect appropriate  Lymph: no nodes appreciated  Neuro: gross sensation intact  Vascular:  Palpable peripheral pulse in noted extremity  Musculoskeletal Extremities: KNEE Exam: patellofemoral joint line tenderness with crepitation, synovitis, swelling, and joint effusion left knee.  mcm neg today  Radiology:   Imaging done by outside location and discussed at length with the patient:    Indication: pain related symptoms,  Views: MRI report read left knee(s)   Findings: advaanced aarthritis of the patellofemoral joint with at least one small loose body  Comparison views: not available      Assessment:     ICD-10-CM ICD-9-CM   1. Arthritis of left knee M17.12 716.96   2. Chondromalacia, patella, left M22.42 717.7   3. Contusion of left knee, subsequent encounter S80.02XD V58.89     924.11        Procedures       Plan: Biomechanics of pertinent body area discussed.  Risks, benefits, alternatives, comparisons, and complications of accepted medicines, injections, recommendations, surgical procedures, and therapies explained and education provided in laymen's  "terms. The patient was given the opportunity to ask questions and they were answerved to their satisfaction.   Natural history and expected course of this patient's diagnosis discussed along with evaluation of therapies. Questions answered.  Prescription medication given with instructions, warnings, and precautions.   PT referral.  Rest, ice, compression, and elevation (RICE) therapy.  Biomechanics and proper use of ambulatory aids:  crutches, walker, cane, &/or trekking poles.   Went over \"success' of visco and will offer if not better in 6 weeks.  Trauma aggravated previously dormant condition.  If gets locking could do scope but explained may aggravate arthritis/exacerbate.  PT      2/9/2018  "

## 2018-02-16 ENCOUNTER — HOSPITAL ENCOUNTER (OUTPATIENT)
Dept: PHYSICAL THERAPY | Facility: HOSPITAL | Age: 53
Setting detail: THERAPIES SERIES
Discharge: HOME | End: 2018-02-16
Attending: ORTHOPAEDIC SURGERY

## 2018-02-16 DIAGNOSIS — M17.12 ARTHRITIS OF LEFT KNEE: Primary | ICD-10-CM

## 2018-02-16 PROCEDURE — G8979 MOBILITY GOAL STATUS: HCPCS | Performed by: PHYSICAL THERAPIST

## 2018-02-16 PROCEDURE — 97110 THERAPEUTIC EXERCISES: CPT | Performed by: PHYSICAL THERAPIST

## 2018-02-16 PROCEDURE — 97161 PT EVAL LOW COMPLEX 20 MIN: CPT | Performed by: PHYSICAL THERAPIST

## 2018-02-16 PROCEDURE — 97140 MANUAL THERAPY 1/> REGIONS: CPT | Performed by: PHYSICAL THERAPIST

## 2018-02-16 PROCEDURE — G8978 MOBILITY CURRENT STATUS: HCPCS | Performed by: PHYSICAL THERAPIST

## 2018-02-16 NOTE — THERAPY EVALUATION
Outpatient Physical Therapy Ortho Initial Evaluation  [facility]     Patient Name: Santos Witt  : 1965  MRN: [id number]  Today's Date: 2018      Visit Date: 2018    Patient Active Problem List   Diagnosis   • Bilateral malignant neoplasm of upper outer quadrant of breast in female   • Chronic pain of left knee   • Acute pain of left knee   • Contusion of left knee   • Arthritis of left knee   • Chondromalacia, patella, left        Past Medical History:   Diagnosis Date   • Anemia     History of anemia   • Cancer     Breast cancer, T2NM0   • Heart murmur         Past Surgical History:   Procedure Laterality Date   • BREAST CYST EXCISION Left     Fibroid cyst left nipple   • HYSTERECTOMY  2011   • KNEE ARTHROSCOPY Left    • PELVIC LAPAROSCOPY      cysts removed       Visit Dx:     ICD-10-CM ICD-9-CM   1. Arthritis of left knee M17.12 716.96             Patient History       18 0800          History    Chief Complaint Difficulty Walking;Difficulty with daily activities;Muscle tenderness;Muscle weakness;Joint swelling;Joint stiffness;Pain  -LC      Type of Pain Knee pain   LEFT  -      Date Current Problem(s) Began 18  -      Brief Description of Current Complaint Pt is 52 y.o female who reports to PT after suffering fall on L knee on 18. She had hx of chronic knee pain 2nd to OA prior to fall. She reports initial swelling and pain, but this has improved over the past month. She received cortisone shot which helped with pain and has also started taking medication prescribed by MD that is alleviating her pain as well. Pt reports pain with sit to stand, squatting, kneeling, stooping, ascend/descend stairs, getting in and out of car, prolonged standing and walking.  -      Onset Date- PT 17  -      Patient/Caregiver Goals Relieve pain;Return to prior level of function;Improve strength;Improve mobility;Know what to do to help the symptoms;Decrease  swelling  -LC      Patient's Rating of General Health Very good  -LC      Occupation/sports/leisure activities , working out at the gym  -LC      How has patient tried to help current problem? medication, ice  -LC      What clinical tests have you had for this problem? MRI  -LC      Results of Clinical Tests no acute fracture of L knee  -LC      Pain     Pain Location Knee   LEFT  -LC      Pain at Present 5  -LC      Pain at Best 3  -LC      Pain at Worst 8  -LC      What Performance Factors Make the Current Problem(s) WORSE?  sit to stand, squatting, kneeling, stooping, ascend/descend stairs, getting in and out of car, prolonged standing and walking  -LC      Difficulties at work? prolonged standing and walking at work  -LC      Difficulties with ADL's?  sit to stand, squatting, kneeling, stooping, ascend/descend stairs, getting in and out of car, prolonged standing and walking  -LC      Fall Risk Assessment    Any falls in the past year: Yes  -LC      Number of falls reported in the last 12 months 1  -LC      Factors that contributed to the fall: Tripped  -LC      Does patient have a fear of falling No  -LC      Daily Activities    Primary Language English  -LC      Pt Participated in POC and Goals Yes  -LC      Safety    Are you being hurt, hit, or frightened by anyone at home or in your life? No  -LC      Are you being neglected by a caregiver No  -LC        User Key  (r) = Recorded By, (t) = Taken By, (c) = Cosigned By    Initials Name Provider Type    KAREN Emery PT DPT Physical Therapist                PT Ortho       02/16/18 0900    Knee Special Tests    Bounce home test (meniscal lesion) Left:;Negative  -LC    Patellar grind test (chondromalacia patella) Left:;Positive  -LC    Left Knee    Extension/Flexion ROM Details 0 to 120  -LC    Right Knee    Extension/Flexion ROM Details 0 to 130  -LC    Left Hip    Hip Flexion Gross Movement (3+/5) fair plus  -LC    Hip ABduction Gross  Movement (3+/5) fair plus  -LC    Hip ADduction Gross Movement (3+/5) fair plus  -LC    Right Hip    Hip Flexion Gross Movement (4/5) good  -LC    Hip ABduction Gross Movement (4/5) good  -LC    Hip ADduction Gross Movement (4/5) good  -LC    Left Knee    Knee Extension Gross Movement (3+/5) fair plus  -LC    Knee Flexion Gross Movement (3+/5) fair plus  -LC    Right Knee    Knee Extension Gross Movement (4/5) good  -LC    Knee Flexion Gross Movement (4/5) good  -LC    Lower Extremity Flexibility    Hamstrings Left:;Mildly limited  -LC      User Key  (r) = Recorded By, (t) = Taken By, (c) = Cosigned By    Initials Name Provider Type    KAREN Emery, PT DPT Physical Therapist                      Therapy Education  Given: HEP, Symptoms/condition management, Pain management  Program: New  How Provided: Verbal, Demonstration, Written  Provided to: Patient  Level of Understanding: Teach back education performed, Demonstrated, Verbalized           PT OP Goals       02/16/18 0800       PT Short Term Goals    STG 1 Pt will be independent with HEP to improve L knee strength and stability and to allow her to return to ADL's and leisure activities with no pain limitations.  -LC     STG 1 Progress New  -LC     STG 2 Pt will have L knee MMT grossly 4/5.  -LC     STG 2 Progress New  -LC     STG 3 Pt will report 15% improvement on KOS  -LC     STG 3 Progress New  -LC     STG 4 Pt will report L knee pain no greater than 2/10 with ADL's.  -LC     STG 4 Progress New  -LC     STG 5 Pt will ascend/descend stairs pain free.  -LC     STG 5 Progress New  -LC     Time Calculation    PT Goal Re-Cert Due Date 03/16/18  -       User Key  (r) = Recorded By, (t) = Taken By, (c) = Cosigned By    Initials Name Provider Type    KAREN Emery, PT DPT Physical Therapist                PT Assessment/Plan       02/16/18 0941       PT Assessment    Functional Limitations Impaired gait;Performance in sport activities;Performance in work  activities;Performance in leisure activities;Limitations in functional capacity and performance  -     Impairments Balance;Gait;Impaired flexibility;Pain;Muscle strength;Joint mobility  -     Assessment Comments Pt is 52 y.o female who reports to PT after suffering fall on L knee on 1/7/18. She had hx of chronic knee pain 2nd to OA prior to fall. She reports initial swelling and pain, but this has improved over the past month. She received cortisone shot which helped with pain and has also started taking medication prescribed by MD that is alleviating her pain as well. Pt reports pain with sit to stand, squatting, kneeling, stooping, ascend/descend stairs, getting in and out of car, prolonged standing and walking. Pt has positive patellar grind test on L knee. She reports pain 5/10 at rest and says that is on average her pain during the day with 8/10 at worst. She has L knee AROM 0 to 120. She has L knee MMT grossly 3+/5. She performed low intensity hip and knee strengthening therex with no reported pain increase. She tolerated manual hamstring stretching well and reported pain reduced to 3/10 following interventions. Pt was educated on HEP for strengthening and stretching L knee and performed all therex correctly with no pain complaints.  -     Please refer to paper survey for additional self-reported information Yes  -LC     Rehab Potential Excellent  -     Patient/caregiver participated in establishment of treatment plan and goals Yes  -LC     Patient would benefit from skilled therapy intervention Yes  -LC     PT Plan    PT Frequency 1x/week  -     Predicted Duration of Therapy Intervention (days/wks) 4 weeks  -     Planned CPT's? PT EVAL LOW COMPLEXITY: 03803;PT RE-EVAL: 68110;PT NEUROMUSC RE-EDUCATION EA 15 MIN: 84832;PT MANUAL THERAPY EA 15 MIN: 98139;PT THER ACT EA 15 MIN: 45496;PT THER PROC EA 15 MIN: 73603  -     Physical Therapy Interventions (Optional Details) home exercise  program;patient/family education;manual therapy techniques;strengthening;stretching;ROM (Range of Motion)  -LC     PT Plan Comments Pt requires skilled therapy to improve L knee functional strength and stability to allow patient to return to exercise activities and ADL's pain free without limitations.  -LC       User Key  (r) = Recorded By, (t) = Taken By, (c) = Cosigned By    Initials Name Provider Type    KAREN Emery PT DPT Physical Therapist                  Exercises       02/16/18 0900          Exercise 1    Exercise Name 1 SLR  -LC      Sets 1 3  -LC      Reps 1 5  -LC      Exercise 2    Exercise Name 2 bridge  -LC      Sets 2 3  -LC      Reps 2 5  -LC      Exercise 3    Exercise Name 3 hip ADD supine  -LC      Sets 3 3  -LC      Reps 3 5  -LC      Exercise 4    Exercise Name 4 hip ABD sidelying  -LC      Sets 4 3  -LC      Reps 4 5  -LC      Exercise 5    Exercise Name 5 HS stretch  -LC      Reps 5 5  -LC      Time (Seconds) 5 15  -LC        User Key  (r) = Recorded By, (t) = Taken By, (c) = Cosigned By    Initials Name Provider Type    KAREN Emery PT DPT Physical Therapist           Manual Rx (last 36 hours)      Manual Treatments       02/16/18 0900          Manual Rx 1    Manual Rx 1 Location L knee  -LC      Manual Rx 1 Type HS stretch  -LC      Manual Rx 1 Duration 15 min  -LC        User Key  (r) = Recorded By, (t) = Taken By, (c) = Cosigned By    Initials Name Provider Type    KAREN Emery PT DPT Physical Therapist                      Outcome Measure Options: Knee Outcome Score- ADL  Knee Outcome Score  Knee Outcome Score Comments: 49%      Time Calculation:   Start Time: 0855  Stop Time: 0940  Time Calculation (min): 45 min  Total Timed Code Minutes- PT: 45 minute(s)     Therapy Charges for Today     Code Description Service Date Service Provider Modifiers Qty    35257593774  PT MOBILITY CURRENT 2/16/2018 Derrick Emery, PT DPT GP, CK 1    [id number]  PT MOBILITY PROJECTED  2/16/2018 Derrick Emery, PT DPT GP, CJ 1    99049403444 HC PT EVAL LOW COMPLEXITY 1 2/16/2018 Derrick Emery, PT DPT GP 1    64683656601 HC PT THER PROC EA 15 MIN 2/16/2018 Derrick Emery, PT DPT GP 1    40136325292 HC PT MANUAL THERAPY EA 15 MIN 2/16/2018 Derrick Emery, PT DPT GP 1          PT G-Codes  PT Professional Judgement Used?: Yes  Outcome Measure Options: Knee Outcome Score- ADL  Score: 49%  Functional Limitation: Mobility: Walking and moving around  Mobility: Walking and Moving Around Current Status (): At least 40 percent but less than 60 percent impaired, limited or restricted  Mobility: Walking and Moving Around Goal Status (): At least 20 percent but less than 40 percent impaired, limited or restricted         Derrick Emery PT DPT  2/16/2018

## 2018-02-20 ENCOUNTER — HOSPITAL ENCOUNTER (OUTPATIENT)
Dept: PHYSICAL THERAPY | Facility: HOSPITAL | Age: 53
Setting detail: THERAPIES SERIES
Discharge: HOME | End: 2018-02-20
Attending: ORTHOPAEDIC SURGERY

## 2018-02-20 DIAGNOSIS — M17.12 ARTHRITIS OF LEFT KNEE: Primary | ICD-10-CM

## 2018-02-20 PROCEDURE — 97140 MANUAL THERAPY 1/> REGIONS: CPT | Performed by: PHYSICAL THERAPIST

## 2018-02-20 PROCEDURE — 97110 THERAPEUTIC EXERCISES: CPT | Performed by: PHYSICAL THERAPIST

## 2018-02-20 NOTE — THERAPY TREATMENT NOTE
Outpatient Physical Therapy Ortho Treatment Note  [facility]     Patient Name: Santos Witt  : 1965  MRN: [id number]  Today's Date: 2018      Visit Date: 2018    Visit Dx:    ICD-10-CM ICD-9-CM   1. Arthritis of left knee M17.12 716.96       Patient Active Problem List   Diagnosis   • Bilateral malignant neoplasm of upper outer quadrant of breast in female   • Chronic pain of left knee   • Acute pain of left knee   • Contusion of left knee   • Arthritis of left knee   • Chondromalacia, patella, left        Past Medical History:   Diagnosis Date   • Anemia     History of anemia   • Cancer     Breast cancer, T2NM0   • Heart murmur         Past Surgical History:   Procedure Laterality Date   • BREAST CYST EXCISION Left     Fibroid cyst left nipple   • HYSTERECTOMY  2011   • KNEE ARTHROSCOPY Left    • PELVIC LAPAROSCOPY      cysts removed             PT Ortho       18 1000    Subjective Comments    Subjective Comments Pt reports HEP is going well. She has decreased pain in L knee and reports walking in [place] with over the weekend with soreness no greater than 5/10. she is pleased with the progress she has made.  -    Subjective Pain    Able to rate subjective pain? yes  -    Pre-Treatment Pain Level 5  -    Post-Treatment Pain Level 2  -    Subjective Pain Comment sore from new therex, general muscle soreness.  -      User Key  (r) = Recorded By, (t) = Taken By, (c) = Cosigned By    Initials Name Provider Type    KAREN Emery, PT DPT Physical Therapist                            PT Assessment/Plan       18 1031       PT Assessment    Functional Limitations Impaired gait;Performance in sport activities;Performance in work activities;Performance in leisure activities;Limitations in functional capacity and performance  -     Impairments Balance;Gait;Impaired flexibility;Pain;Muscle strength;Joint mobility  -     Assessment Comments Pt reports  pain 5/10 today. She tolerated prolonged walking during visit to [place] over the weekend with no episodes of knee buckling or locking up. She is totally compliant with HEP and reports that therex and HS stretch reduces pain. She is pleased with the progress she has made performing HEP. She tolerated new standing therex and progressed resistance therex with only complaints of muscular soreness and minimal pain. Pt responded well to manual hamstring stretching and reported decreased pain following skilled intervention. she will continue to be progressed to improve her L knee strength and mobility.  -LC     PT Plan    PT Plan Comments Pt requires continued skilled intervention to include therex, manual, strengthening, stretching, and ROM to L knee.  -LC       User Key  (r) = Recorded By, (t) = Taken By, (c) = Cosigned By    Initials Name Provider Type    KAREN Emery, PT DPT Physical Therapist                    Exercises       02/20/18 1000          Subjective Comments    Subjective Comments Pt reports HEP is going well. She has decreased pain in L knee and reports walking in [place] with over the weekend with soreness no greater than 5/10. she is pleased with the progress she has made.  -LC      Subjective Pain    Able to rate subjective pain? yes  -LC      Pre-Treatment Pain Level 5  -LC      Post-Treatment Pain Level 2  -LC      Subjective Pain Comment sore from new therex, general muscle soreness.  -LC      Exercise 1    Exercise Name 1 Nu step  -LC      Time (Minutes) 1 5  -LC      Exercise 2    Exercise Name 2 standing hip EXt and aBD RTB  -LC      Sets 2 2  -LC      Reps 2 8  -LC      Exercise 3    Exercise Name 3 side step down  -LC      Sets 3 3  -LC      Reps 3 5  -LC      Time (Seconds) 3 3  -LC      Exercise 4    Exercise Name 4 LAQ 2#s  -LC      Sets 4 3  -LC      Reps 4 5  -LC      Exercise 5    Exercise Name 5 seated HS curl  -LC      Sets 5 3  -LC      Reps 5 8  -LC      Additional Comments  RTB  -LC      Exercise 6    Exercise Name 6 sidelying hip ABD 2#s  -LC      Sets 6 3  -LC      Reps 6 5  -LC      Exercise 7    Exercise Name 7 bridge on ball  -LC      Sets 7 3  -LC      Reps 7 5  -LC      Exercise 8    Exercise Name 8 HS curl on ball  -LC      Sets 8 3  -LC      Reps 8 8  -LC        User Key  (r) = Recorded By, (t) = Taken By, (c) = Cosigned By    Initials Name Provider Type    KAREN Emery, PT DPT Physical Therapist                        Manual Rx (last 36 hours)      Manual Treatments       02/20/18 0900          Manual Rx 1    Manual Rx 1 Location L knee  -LC      Manual Rx 1 Type HS stretch in prone and supine, muscle release  -LC      Manual Rx 1 Duration 15 min  -LC        User Key  (r) = Recorded By, (t) = Taken By, (c) = Cosigned By    Initials Name Provider Type    KAREN Emery, PT DPT Physical Therapist                PT OP Goals       02/20/18 1000       PT Short Term Goals    STG 1 Pt will be independent with HEP to improve L knee strength and stability and to allow her to return to ADL's and leisure activities with no pain limitations.  -LC     STG 1 Progress Progressing  -LC     STG 2 Pt will have L knee MMT grossly 4/5.  -LC     STG 2 Progress Progressing  -LC     STG 3 Pt will report 15% improvement on KOS  -LC     STG 3 Progress Progressing  -LC     STG 4 Pt will report L knee pain no greater than 2/10 with ADL's.  -LC     STG 4 Progress Progressing  -LC     STG 5 Pt will ascend/descend stairs pain free.  -LC     STG 5 Progress Progressing  -LC       User Key  (r) = Recorded By, (t) = Taken By, (c) = Cosigned By    Initials Name Provider Type    KAREN Emery, PT DPT Physical Therapist          Therapy Education  Given: HEP, Symptoms/condition management, Pain management  Program: Progressed  How Provided: Verbal, Demonstration, Written  Provided to: Patient  Level of Understanding: Teach back education performed, Demonstrated, Verbalized              Time  Calculation:   Start Time: 0945  Stop Time: 1030  Time Calculation (min): 45 min  Total Timed Code Minutes- PT: 45 minute(s)    Therapy Charges for Today     Code Description Service Date Service Provider Modifiers Qty    [id number]  PT THER PROC EA 15 MIN 2/20/2018 Derrick Emery, PT DPT GP 2    49332978190 HC PT MANUAL THERAPY EA 15 MIN 2/20/2018 Derrick Emery, PT DPT GP 1                    Derrick Emery, PT DPT  2/20/2018

## 2018-02-27 ENCOUNTER — HOSPITAL ENCOUNTER (OUTPATIENT)
Dept: PHYSICAL THERAPY | Facility: HOSPITAL | Age: 53
Setting detail: THERAPIES SERIES
Discharge: HOME | End: 2018-02-27
Attending: ORTHOPAEDIC SURGERY

## 2018-02-27 DIAGNOSIS — M17.12 ARTHRITIS OF LEFT KNEE: Primary | ICD-10-CM

## 2018-02-27 PROCEDURE — 97140 MANUAL THERAPY 1/> REGIONS: CPT | Performed by: PHYSICAL THERAPIST

## 2018-02-27 PROCEDURE — 97110 THERAPEUTIC EXERCISES: CPT | Performed by: PHYSICAL THERAPIST

## 2018-02-27 NOTE — THERAPY TREATMENT NOTE
Outpatient Physical Therapy Ortho Treatment Note  [facility]     Patient Name: Santos Witt  : 1965  MRN: [id number]  Today's Date: 2018      Visit Date: 2018    Visit Dx:    ICD-10-CM ICD-9-CM   1. Arthritis of left knee M17.12 716.96       Patient Active Problem List   Diagnosis   • Bilateral malignant neoplasm of upper outer quadrant of breast in female   • Chronic pain of left knee   • Acute pain of left knee   • Contusion of left knee   • Arthritis of left knee   • Chondromalacia, patella, left        Past Medical History:   Diagnosis Date   • Anemia     History of anemia   • Cancer     Breast cancer, T2NM0   • Heart murmur         Past Surgical History:   Procedure Laterality Date   • BREAST CYST EXCISION Left     Fibroid cyst left nipple   • HYSTERECTOMY  2011   • KNEE ARTHROSCOPY Left    • PELVIC LAPAROSCOPY      cysts removed             PT Ortho       18 0900    Subjective Comments    Subjective Comments Pt reports that she feels stronger in her L knee, but continues to have feeling of soreness and difficulty with stairness. She does indicate that she no longer has sharp pain exacerbations. She is totally compliant with HEP and enjoys performing her exercises at home or at the gym.  -LC    Subjective Pain    Able to rate subjective pain? yes  -LC    Pre-Treatment Pain Level 4  -LC    Post-Treatment Pain Level 2  -LC    Left Hip    Hip Flexion Gross Movement (4-/5) good minus  -LC    Hip ABduction Gross Movement (4-/5) good minus  -LC    Hip ADduction Gross Movement (4-/5) good minus  -LC    Left Knee    Knee Extension Gross Movement (4-/5) good minus  -LC    Knee Flexion Gross Movement (4-/5) good minus  -LC      User Key  (r) = Recorded By, (t) = Taken By, (c) = Cosigned By    Initials Name Provider Type    KAREN Emery, PT DPT Physical Therapist                            PT Assessment/Plan       18 1025       PT Assessment    Functional  Limitations Impaired gait;Performance in sport activities;Performance in work activities;Performance in leisure activities;Limitations in functional capacity and performance  -     Impairments Balance;Gait;Impaired flexibility;Pain;Muscle strength;Joint mobility  -     Assessment Comments Pt reports pain 4/10 today with general L knee soreness on anterior portion. She demonstrates LLE MMT grossly 4-/5 and reports that she feels stronger while performing ADL's. She tolerated all therex well today with reports of only minimal soreness. She was progressed to weighted 1/4 squats and reported no pain with this activity. She is pleased with the progress she has made.  -LC     PT Plan    PT Plan Comments Pt requires continued skilled therapy to improve L Knee strength and mobility to allow for performance of ADL's with no impairment or limitation.  -       User Key  (r) = Recorded By, (t) = Taken By, (c) = Cosigned By    Initials Name Provider Type    KAREN Emery, PT DPT Physical Therapist                    Exercises       02/27/18 0900          Subjective Comments    Subjective Comments Pt reports that she feels stronger in her L knee, but continues to have feeling of soreness and difficulty with stairness. She does indicate that she no longer has sharp pain exacerbations. She is totally compliant with HEP and enjoys performing her exercises at home or at the gym.  -LC      Subjective Pain    Able to rate subjective pain? yes  -LC      Pre-Treatment Pain Level 4  -LC      Post-Treatment Pain Level 2  -LC      Exercise 1    Exercise Name 1 Nu step  -LC      Time (Minutes) 1 5  -LC      Exercise 2    Exercise Name 2 standing hip EXt and aBD GTB  -LC      Sets 2 2  -LC      Reps 2 8  -LC      Exercise 3    Exercise Name 3 TKE RTB on foam  -LC      Sets 3 3  -LC      Reps 3 8  -LC      Exercise 4    Exercise Name 4 LAQ 2#s  -LC      Sets 4 4  -LC      Reps 4 5  -LC      Exercise 5    Exercise Name 5 seated HS  curl  -LC      Sets 5 3  -LC      Reps 5 8  -LC      Additional Comments GTB  -LC      Exercise 6    Exercise Name 6 squat with 4#s  -LC      Sets 6 4  -LC      Reps 6 5  -LC      Exercise 7    Exercise Name 7 bridge on ball  -LC      Sets 7 3  -LC      Reps 7 5  -LC      Exercise 8    Exercise Name 8 HS curl on ball  -LC      Sets 8 3  -LC      Reps 8 8  -LC        User Key  (r) = Recorded By, (t) = Taken By, (c) = Cosigned By    Initials Name Provider Type    KAREN Emery, PT DPT Physical Therapist                        Manual Rx (last 36 hours)      Manual Treatments       02/27/18 0900          Manual Rx 1    Manual Rx 1 Location L knee  -LC      Manual Rx 1 Type HS stretch in prone and supine, muscle release  -LC      Manual Rx 1 Duration 15 min  -LC        User Key  (r) = Recorded By, (t) = Taken By, (c) = Cosigned By    Initials Name Provider Type    KAREN Emery, PT DPT Physical Therapist                PT OP Goals       02/27/18 1000       PT Short Term Goals    STG 1 Pt will be independent with HEP to improve L knee strength and stability and to allow her to return to ADL's and leisure activities with no pain limitations.  -LC     STG 1 Progress Progressing  -LC     STG 2 Pt will have L knee MMT grossly 4/5.  -LC     STG 2 Progress Progressing  -LC     STG 3 Pt will report 15% improvement on KOS  -LC     STG 3 Progress Progressing  -LC     STG 4 Pt will report L knee pain no greater than 2/10 with ADL's.  -LC     STG 4 Progress Progressing  -LC     STG 5 Pt will ascend/descend stairs pain free.  -LC     STG 5 Progress Progressing  -LC       User Key  (r) = Recorded By, (t) = Taken By, (c) = Cosigned By    Initials Name Provider Type    KAREN Emery, PT DPT Physical Therapist          Therapy Education  Given: HEP, Symptoms/condition management, Pain management  Program: Reinforced  How Provided: Verbal, Demonstration  Provided to: Patient  Level of Understanding: Teach back education  performed, Demonstrated, Verbalized              Time Calculation:   Start Time: 0900  Stop Time: 0945  Time Calculation (min): 45 min  Total Timed Code Minutes- PT: 45 minute(s)    Therapy Charges for Today     Code Description Service Date Service Provider Modifiers Qty    44382227743  PT THER PROC EA 15 MIN 2/27/2018 Derrick Emery, PT DPT GP 2    07357604044 HC PT MANUAL THERAPY EA 15 MIN 2/27/2018 Derrick Emery, PT DPT GP 1                    Derrick Emery, PT DPT  2/27/2018

## 2018-03-05 ENCOUNTER — HOSPITAL ENCOUNTER (OUTPATIENT)
Dept: PHYSICAL THERAPY | Facility: HOSPITAL | Age: 53
Setting detail: THERAPIES SERIES
Discharge: HOME | End: 2018-03-05
Attending: ORTHOPAEDIC SURGERY

## 2018-03-05 DIAGNOSIS — M17.12 ARTHRITIS OF LEFT KNEE: Primary | ICD-10-CM

## 2018-03-05 PROCEDURE — 97140 MANUAL THERAPY 1/> REGIONS: CPT | Performed by: PHYSICAL THERAPIST

## 2018-03-05 PROCEDURE — 97110 THERAPEUTIC EXERCISES: CPT | Performed by: PHYSICAL THERAPIST

## 2018-03-05 NOTE — THERAPY TREATMENT NOTE
Outpatient Physical Therapy Ortho Treatment Note  [facility]     Patient Name: Santos Witt  : 1965  MRN: [id number]  Today's Date: 3/5/2018      Visit Date: 2018    Visit Dx:    ICD-10-CM ICD-9-CM   1. Arthritis of left knee M17.12 716.96       Patient Active Problem List   Diagnosis   • Bilateral malignant neoplasm of upper outer quadrant of breast in female   • Chronic pain of left knee   • Acute pain of left knee   • Contusion of left knee   • Arthritis of left knee   • Chondromalacia, patella, left        Past Medical History:   Diagnosis Date   • Anemia     History of anemia   • Cancer     Breast cancer, T2NM0   • Heart murmur         Past Surgical History:   Procedure Laterality Date   • BREAST CYST EXCISION Left     Fibroid cyst left nipple   • HYSTERECTOMY  2011   • KNEE ARTHROSCOPY Left    • PELVIC LAPAROSCOPY      cysts removed             PT Ortho       18 0900    Subjective Comments    Subjective Comments Pt reports that she feels much stronger. She has less pain only general soreness. She reports stairs and getting in and out of a car is much easier.  -LC    Subjective Pain    Able to rate subjective pain? yes  -LC    Pre-Treatment Pain Level 3  -LC    Post-Treatment Pain Level 0  -LC    Left Hip    Hip Flexion Gross Movement (4/5) good  -LC    Hip ABduction Gross Movement (4/5) good  -LC    Hip ADduction Gross Movement (4/5) good  -LC    Left Knee    Knee Extension Gross Movement (4/5) good  -LC    Knee Flexion Gross Movement (4/5) good  -LC      User Key  (r) = Recorded By, (t) = Taken By, (c) = Cosigned By    Initials Name Provider Type    KAREN Emery PT DPT Physical Therapist                            PT Assessment/Plan       18 1038       PT Assessment    Functional Limitations Impaired gait;Performance in sport activities;Performance in work activities;Performance in leisure activities;Limitations in functional capacity and performance   -LC     Impairments Balance;Gait;Impaired flexibility;Pain;Muscle strength;Joint mobility  -LC     Assessment Comments Pt reports pain 2-3/10 today. She reports that her knee feels better and she can tell that she is stronger. She has decreased pain with ascending and descending stairs although this is still uncomfortable. She feels that she has good understanding of her HEP and continues to perform exercises regularly and well as ice when she is more active. She demonstrates BLE MMT grossly 4/5. She continues to progress well with increased resistance and more difficult functional therex at her therapy sessions.  -LC     PT Plan    PT Plan Comments Pt requires continued skilled therapy to include therex, manual, strengthening, stretching, and ROM.  -LC       User Key  (r) = Recorded By, (t) = Taken By, (c) = Cosigned By    Initials Name Provider Type    KAREN Emery, PT DPT Physical Therapist                    Exercises       03/05/18 0900          Subjective Comments    Subjective Comments Pt reports that she feels much stronger. She has less pain only general soreness. She reports stairs and getting in and out of a car is much easier.  -LC      Subjective Pain    Able to rate subjective pain? yes  -LC      Pre-Treatment Pain Level 3  -LC      Post-Treatment Pain Level 0  -LC      Exercise 1    Exercise Name 1 Nu step  -LC      Time (Minutes) 1 5  -LC      Exercise 2    Exercise Name 2 standing hip EXt and aBD GTB  -LC      Sets 2 2  -LC      Reps 2 8  -LC      Exercise 4    Exercise Name 4 LAQ 3#s  -LC      Sets 4 3  -LC      Reps 4 8  -LC      Exercise 5    Exercise Name 5 seated HS curl  -LC      Sets 5 3  -LC      Reps 5 8  -LC      Additional Comments BTB  -LC      Exercise 6    Exercise Name 6 squat with 4#s  -LC      Sets 6 4  -LC      Reps 6 5  -LC      Exercise 7    Exercise Name 7 bridge on ball  -LC      Sets 7 3  -LC      Reps 7 8  -LC      Exercise 8    Exercise Name 8 HS curl on ball  -LC       Sets 8 3  -LC      Reps 8 8  -LC        User Key  (r) = Recorded By, (t) = Taken By, (c) = Cosigned By    Initials Name Provider Type    KAREN Emery, PT DPT Physical Therapist                        Manual Rx (last 36 hours)      Manual Treatments       03/05/18 0900          Manual Rx 1    Manual Rx 1 Location L knee  -LC      Manual Rx 1 Type HS stretch in prone and supine, muscle release  -LC      Manual Rx 1 Duration 15 min  -        User Key  (r) = Recorded By, (t) = Taken By, (c) = Cosigned By    Initials Name Provider Type    KAREN Meza JOHANA Rupert, PT DPT Physical Therapist                PT OP Goals       03/05/18 1000       PT Short Term Goals    STG 1 Pt will be independent with HEP to improve L knee strength and stability and to allow her to return to ADL's and leisure activities with no pain limitations.  -LC     STG 1 Progress Progressing  -LC     STG 2 Pt will have L knee MMT grossly 4/5.  -LC     STG 2 Progress Progressing  -LC     STG 3 Pt will report 15% improvement on KOS  -LC     STG 3 Progress Progressing  -LC     STG 4 Pt will report L knee pain no greater than 2/10 with ADL's.  -LC     STG 4 Progress Progressing  -     STG 5 Pt will ascend/descend stairs pain free.  -     STG 5 Progress Progressing  -       User Key  (r) = Recorded By, (t) = Taken By, (c) = Cosigned By    Initials Name Provider Type    KAREN Meza JOHANA Rupert, PT DPT Physical Therapist          Therapy Education  Given: HEP, Symptoms/condition management, Pain management  Program: Reinforced  How Provided: Verbal, Demonstration  Provided to: Patient  Level of Understanding: Teach back education performed, Demonstrated, Verbalized              Time Calculation:   Start Time: 0945  Stop Time: 1023  Time Calculation (min): 38 min  Total Timed Code Minutes- PT: 38 minute(s)    Therapy Charges for Today     Code Description Service Date Service Provider Modifiers Qty    68416666098 HC PT THER PROC EA 15 MIN 3/5/2018 Derrick VAUGHN  Rupert, PT DPT GP 2    [id number]  PT MANUAL THERAPY EA 15 MIN 3/5/2018 Derrick Emery, PT DPT GP 1                    Derrick Emery, PT DPT  3/5/2018

## 2018-03-12 ENCOUNTER — HOSPITAL ENCOUNTER (OUTPATIENT)
Dept: PHYSICAL THERAPY | Facility: HOSPITAL | Age: 53
Setting detail: THERAPIES SERIES
Discharge: HOME | End: 2018-03-12
Attending: ORTHOPAEDIC SURGERY

## 2018-03-12 DIAGNOSIS — M17.12 ARTHRITIS OF LEFT KNEE: Primary | ICD-10-CM

## 2018-03-12 PROCEDURE — 97110 THERAPEUTIC EXERCISES: CPT | Performed by: PHYSICAL THERAPIST

## 2018-03-12 PROCEDURE — 97140 MANUAL THERAPY 1/> REGIONS: CPT | Performed by: PHYSICAL THERAPIST

## 2018-03-12 NOTE — THERAPY TREATMENT NOTE
Outpatient Physical Therapy Ortho Treatment Note  [facility]     Patient Name: Santos Witt  : 1965  MRN: [id number]  Today's Date: 3/12/2018      Visit Date: 2018    Visit Dx:    ICD-10-CM ICD-9-CM   1. Arthritis of left knee M17.12 716.96       Patient Active Problem List   Diagnosis   • Bilateral malignant neoplasm of upper outer quadrant of breast in female   • Chronic pain of left knee   • Acute pain of left knee   • Contusion of left knee   • Arthritis of left knee   • Chondromalacia, patella, left        Past Medical History:   Diagnosis Date   • Anemia     History of anemia   • Cancer     Breast cancer, T2NM0   • Heart murmur         Past Surgical History:   Procedure Laterality Date   • BREAST CYST EXCISION Left     Fibroid cyst left nipple   • HYSTERECTOMY  2011   • KNEE ARTHROSCOPY Left    • PELVIC LAPAROSCOPY      cysts removed             PT Ortho     Row Name 18 1000       Subjective Comments    Subjective Comments Pt reports her knee feels great. She walked in [place] with no pain exacerbation and can tell she is much stronger. She reports HEP is going well and she is exercising regularly at her gym. She reports no pain with ascending and descending stairs and getting in and out of her car.  -LC       Subjective Pain    Able to rate subjective pain? yes  -LC    Pre-Treatment Pain Level 1  -LC    Post-Treatment Pain Level 0  -LC       Right Lower Ext    Rt Knee Extension/Flexion AROM WNL  -LC       Left Lower Ext    Lt Knee Extension/Flexion AROM WNL  -LC       Left Knee (Manual Muscle Testing)    Left Knee Manual Muscle Testing (MMT) --  -LC    MMT: Extension, Left Knee extension  -LC    MMT, Gross Movement: Left Knee Extension (4/5) good  -LC    MMT: Flexion, Left Knee flexion  -LC    MMT, Gross Movement: Left Knee Flexion (4/5) good  -LC       Right Knee (Manual Muscle Testing)    Right Knee Manual Muscle Testing (MMT) --  -LC    MMT: Extension, Right  Knee extension  -    MMT, Gross Movement: Right Knee Extension (4/5) good  -    MMT: Flexion, Right Knee flexion  -    MMT, Gross Movement: Right Knee Flexion (4/5) good  -      User Key  (r) = Recorded By, (t) = Taken By, (c) = Cosigned By    Initials Name Provider Type    KAREN Emery, PT DPT Physical Therapist                            PT Assessment/Plan     Row Name 03/12/18 1133          PT Assessment    Functional Limitations Impaired gait;Performance in sport activities;Performance in work activities;Performance in leisure activities;Limitations in functional capacity and performance  -     Impairments Balance;Gait;Impaired flexibility;Pain;Muscle strength;Joint mobility  -     Assessment Comments Pt reports pain 1/10 today. She indicates she ambulated in [place] all last week with no pain exacerbation. She reports ascending and descending stairs pain free and is able to get in and out of her car pain free. Her L knee AROM is WNL and she has MMT grossly 4/5 for LLE. She reports she is very pleased with her HEP and has no pain with performing therex. She was educated on Irish deadlift with DBs today and performed exercise correctly. She was issued written copy for HEP. Pt feels comfortable with her next scheduled visit being her last.  -        PT Plan    PT Plan Comments Pt requires continued skilled intervention to improve L knee strength and stability to allow performance of ADL's and leisure activities with no limitations.  -       User Key  (r) = Recorded By, (t) = Taken By, (c) = Cosigned By    Initials Name Provider Type    KAREN Emery, LOTUS DPT Physical Therapist                    Exercises     Row Name 03/12/18 1100 03/12/18 1000          Subjective Comments    Subjective Comments  -- Pt reports her knee feels great. She walked in [place] with no pain exacerbation and can tell she is much stronger. She reports HEP is going well and she is exercising regularly at  her gym. She reports no pain with ascending and descending stairs and getting in and out of her car.  -LC        Subjective Pain    Able to rate subjective pain?  -- yes  -LC     Pre-Treatment Pain Level  -- 1  -LC     Post-Treatment Pain Level  -- 0  -LC        Exercise 1    Exercise Name 1 Nu step  -LC  --     Time 1 5  -LC  --        Exercise 2    Exercise Name 2 standing hip EXt and aBD GTB  -LC  --     Sets 2 3  -LC  --     Reps 2 8  -LC  --     Sets 2 2  -LC  --     Reps 2 8  -LC  --        Exercise 4    Exercise Name 4 LAQ 5#s  -LC  --     Sets 4 3  -LC  --     Reps 4 8  -LC  --        Exercise 5    Exercise Name 5 seated HS curl  -LC  --     Sets 5 3  -LC  --     Reps 5 8  -LC  --     Additional Comments BTB  -LC  --     Sets 5 3  -LC  --     Reps 5 8  -LC  --        Exercise 6    Exercise Name 6 squat with 8#s  -LC  --     Sets 6 3  -LC  --     Reps 6 8  -LC  --     Sets 6 4  -LC  --     Reps 6 5  -LC  --        Exercise 7    Exercise Name 7 bridge on ball  -LC  --     Sets 7 3  -LC  --     Reps 7 8  -LC  --     Sets 7 3  -LC  --     Reps 7 8  -LC  --        Exercise 8    Exercise Name 8 HS curl on ball  -LC  --     Sets 8 3  -LC  --     Reps 8 8  -LC  --     Sets 8 3  -LC  --     Reps 8 8  -LC  --        Exercise 9    Exercise Name 9 deadlift  -LC  --     Sets 9 3  -LC  --     Reps 9 8  -LC  --     Additional Comments 7#s DBs  -LC  --       User Key  (r) = Recorded By, (t) = Taken By, (c) = Cosigned By    Initials Name Provider Type    LC Derrick Emery, PT DPT Physical Therapist                        Manual Rx (last 36 hours)      Manual Treatments     Row Name 03/12/18 1100             Manual Rx 1    Manual Rx 1 Location L knee  -LC      Manual Rx 1 Type HS stretch in prone and supine, muscle release  -LC      Manual Rx 1 Duration 15 min  -LC        User Key  (r) = Recorded By, (t) = Taken By, (c) = Cosigned By    Initials Name Provider Type    LC Derrick Emery, PT DPT Physical Therapist                 PT OP Goals     Row Name 03/12/18 1100          PT Short Term Goals    STG 1 Pt will be independent with HEP to improve L knee strength and stability and to allow her to return to ADL's and leisure activities with no pain limitations.  -     STG 1 Progress Progressing  -     STG 2 Pt will have L knee MMT grossly 4/5.  -     STG 2 Progress Met  -LC     STG 3 Pt will report 15% improvement on KOS  -     STG 3 Progress Progressing  -     STG 4 Pt will report L knee pain no greater than 2/10 with ADL's.  -     STG 4 Progress Met  -     STG 5 Pt will ascend/descend stairs pain free.  -     STG 5 Progress Met  -       User Key  (r) = Recorded By, (t) = Taken By, (c) = Cosigned By    Initials Name Provider Type    KAREN Emery, PT DPT Physical Therapist          Therapy Education  Given: HEP, Symptoms/condition management, Pain management  Program: Progressed  How Provided: Verbal, Demonstration, Written  Provided to: Patient  Level of Understanding: Teach back education performed, Demonstrated, Verbalized              Time Calculation:   Start Time: 0945  Stop Time: 1030  Time Calculation (min): 45 min  Total Timed Code Minutes- PT: 45 minute(s)    Therapy Charges for Today     Code Description Service Date Service Provider Modifiers Qty    43265364827 HC PT THER PROC EA 15 MIN 3/12/2018 Derrick Emery, PT DPT GP 2    00711586305 HC PT MANUAL THERAPY EA 15 MIN 3/12/2018 Derrick Emery, PT DPT GP 1                    Derrick Emery PT DPT  3/12/2018

## 2018-03-19 ENCOUNTER — HOSPITAL ENCOUNTER (OUTPATIENT)
Dept: PHYSICAL THERAPY | Facility: HOSPITAL | Age: 53
Setting detail: THERAPIES SERIES
Discharge: HOME | End: 2018-03-19
Attending: ORTHOPAEDIC SURGERY

## 2018-03-19 DIAGNOSIS — M17.12 ARTHRITIS OF LEFT KNEE: Primary | ICD-10-CM

## 2018-03-19 PROCEDURE — G8980 MOBILITY D/C STATUS: HCPCS | Performed by: PHYSICAL THERAPIST

## 2018-03-19 PROCEDURE — G8979 MOBILITY GOAL STATUS: HCPCS | Performed by: PHYSICAL THERAPIST

## 2018-03-19 PROCEDURE — 97140 MANUAL THERAPY 1/> REGIONS: CPT | Performed by: PHYSICAL THERAPIST

## 2018-03-19 PROCEDURE — 97110 THERAPEUTIC EXERCISES: CPT | Performed by: PHYSICAL THERAPIST

## 2018-03-19 NOTE — THERAPY DISCHARGE NOTE
Outpatient Physical Therapy Ortho Treatment Note/Discharge Summary  [facility]     Patient Name: Santos Witt  : 1965  MRN: [id number]  Today's Date: 3/19/2018      Visit Date: 2018    Visit Dx:    ICD-10-CM ICD-9-CM   1. Arthritis of left knee M17.12 716.96       Patient Active Problem List   Diagnosis   • Bilateral malignant neoplasm of upper outer quadrant of breast in female   • Chronic pain of left knee   • Acute pain of left knee   • Contusion of left knee   • Arthritis of left knee   • Chondromalacia, patella, left        Past Medical History:   Diagnosis Date   • Anemia     History of anemia   • Cancer     Breast cancer, T2NM0   • Heart murmur         Past Surgical History:   Procedure Laterality Date   • BREAST CYST EXCISION Left     Fibroid cyst left nipple   • HYSTERECTOMY  2011   • KNEE ARTHROSCOPY Left    • PELVIC LAPAROSCOPY      cysts removed             PT Ortho     Row Name 18 0900       Subjective Comments    Subjective Comments Pt reports that she is very comfortable with her HEP and performs it regularly during the week at the gym. She has returned to [facility] and is pain free with ADL's of walking and climbing and descending stairs. She is comfortable being D/C to independence with HEP. She is pleased with the progress she has made.  -LC       Subjective Pain    Able to rate subjective pain? yes  -LC    Pre-Treatment Pain Level 0  -LC    Post-Treatment Pain Level 0  -LC       Right Lower Ext    Rt Knee Extension/Flexion AROM WNL  -LC       Left Lower Ext    Lt Knee Extension/Flexion AROM WNL  -LC       Left Knee (Manual Muscle Testing)    MMT: Extension, Left Knee extension  -LC    MMT, Gross Movement: Left Knee Extension (4/5) good  -LC    MMT: Flexion, Left Knee flexion  -LC    MMT, Gross Movement: Left Knee Flexion (4/5) good  -LC       Right Knee (Manual Muscle Testing)    MMT: Extension, Right Knee extension  -LC    MMT, Gross Movement: Right Knee  Extension (4/5) good  -    MMT: Flexion, Right Knee flexion  -    MMT, Gross Movement: Right Knee Flexion (4/5) good  -      User Key  (r) = Recorded By, (t) = Taken By, (c) = Cosigned By    Initials Name Provider Type    KAREN Emery, PT DPT Physical Therapist                            PT Assessment/Plan     Row Name 03/19/18 0950          PT Assessment    Functional Limitations Impaired gait;Performance in sport activities;Performance in work activities;Performance in leisure activities;Limitations in functional capacity and performance  -     Impairments Balance;Gait;Impaired flexibility;Pain;Muscle strength;Joint mobility  -     Assessment Comments Pt reports that she is very comfortable with her HEP and performs it regularly during the week at the gym. She has returned to [place] and is pain free with ADL's of walking and climbing and descending stairs. She is comfortable being D/C to independence with HEP. She is pleased with the progress she has made. She has B knee AROM WNL. She has BLE MMT grossly 4/5. Pt reports only 15% impairment on KOS. She is very pleased with her progressed. She has achieved all of her goals at this time and is appropriate to D/C to independence with HEP.  -        PT Plan    PT Plan Comments Pt will D/C to independence with HEP  -       User Key  (r) = Recorded By, (t) = Taken By, (c) = Cosigned By    Initials Name Provider Type    KAREN Emery, PT DPT Physical Therapist                    Exercises     Row Name 03/19/18 0900             Subjective Comments    Subjective Comments Pt reports that she is very comfortable with her HEP and performs it regularly during the week at the gym. She has returned to [place] and is pain free with ADL's of walking and climbing and descending stairs. She is comfortable being D/C to independence with HEP. She is pleased with the progress she has made.  -         Subjective Pain    Able to rate subjective pain? yes  -       Pre-Treatment Pain Level 0  -LC      Post-Treatment Pain Level 0  -LC         Exercise 1    Exercise Name 1 Nu step  -LC      Time 1 5  -LC         Exercise 2    Exercise Name 2 standing hip EXt and aBD GTB  -LC      Sets 2 3  -LC      Reps 2 8  -LC      Sets 2 2  -LC      Reps 2 8  -LC         Exercise 4    Exercise Name 4 LAQ 5#s  -LC      Sets 4 3  -LC      Reps 4 8  -LC      Sets 4 3  -LC      Reps 4 8  -LC         Exercise 5    Exercise Name 5 seated HS curl  -LC      Sets 5 3  -LC      Reps 5 8  -LC      Additional Comments BTB  -LC      Sets 5 3  -LC      Reps 5 8  -LC         Exercise 6    Exercise Name 6 squat with 8#s  -LC      Sets 6 3  -LC      Reps 6 8  -LC      Sets 6 4  -LC      Reps 6 5  -LC         Exercise 7    Exercise Name 7 bridge on ball  -LC      Sets 7 3  -LC      Reps 7 8  -LC      Sets 7 3  -LC      Reps 7 8  -LC         Exercise 8    Exercise Name 8 HS curl on ball  -LC      Sets 8 3  -LC      Reps 8 8  -LC      Additional Comments single leg, with bridge  -LC      Sets 8 3  -LC      Reps 8 8  -LC         Exercise 9    Exercise Name 9 deadlift  -LC      Sets 9 3  -LC      Reps 9 8  -LC      Additional Comments 8# medball  -LC         Exercise 10    Exercise Name 10 toe and heel raise  -LC      Sets 10 2  -LC      Reps 10 10  -LC      Additional Comments 5# DBs  -LC        User Key  (r) = Recorded By, (t) = Taken By, (c) = Cosigned By    Initials Name Provider Type    LC Derrick Emery, PT DPT Physical Therapist                        Manual Rx (last 36 hours)      Manual Treatments     Row Name 03/19/18 0900             Manual Rx 1    Manual Rx 1 Location L knee  -LC      Manual Rx 1 Type HS stretch in prone and supine, muscle release  -LC      Manual Rx 1 Duration 15 min  -LC        User Key  (r) = Recorded By, (t) = Taken By, (c) = Cosigned By    Initials Name Provider Type    LC Derrick Emery, PT DPT Physical Therapist                PT OP Goals     Row Name 03/19/18 0900          PT  Short Term Goals    STG 1 Pt will be independent with HEP to improve L knee strength and stability and to allow her to return to ADL's and leisure activities with no pain limitations.  -     STG 1 Progress Progressing  -     STG 2 Pt will have L knee MMT grossly 4/5.  -LC     STG 2 Progress Met  -LC     STG 3 Pt will report 15% improvement on KOS  -LC     STG 3 Progress Met  -     STG 3 Progress Comments 36% improvement  -     STG 4 Pt will report L knee pain no greater than 2/10 with ADL's.  -     STG 4 Progress Met  -     STG 5 Pt will ascend/descend stairs pain free.  -     STG 5 Progress Met  -       User Key  (r) = Recorded By, (t) = Taken By, (c) = Cosigned By    Initials Name Provider Type     Derirck Emery, PT DPT Physical Therapist          Therapy Education  Given: HEP, Symptoms/condition management, Pain management  Program: Reinforced  How Provided: Verbal, Demonstration  Provided to: Patient  Level of Understanding: Teach back education performed, Demonstrated, Verbalized    Outcome Measure Options: Knee Outcome Score- ADL  Knee Outcome Score  Knee Outcome Score Comments: 85%      Time Calculation:   Start Time: 0900  Stop Time: 0943  Time Calculation (min): 43 min  Total Timed Code Minutes- PT: 43 minute(s)    Therapy Charges for Today     Code Description Service Date Service Provider Modifiers Qty    36150527806 HC PT MOBILITY PROJECTED 3/19/2018 Derrick Emery, PT DPT GP, CJ 1    83926476957 HC PT MOBILITY DISCHARGE 3/19/2018 Derrick Emery PT DPT GP, CI 1    [id number] HC PT THER PROC EA 15 MIN 3/19/2018 Derrick Emery PT DPT GP 2    [id number] HC PT MANUAL THERAPY EA 15 MIN 3/19/2018 Derrick Emery, PT DPT GP 1          PT G-Codes  PT Professional Judgement Used?: Yes  Outcome Measure Options: Knee Outcome Score- ADL  Score: 85%  Functional Limitation: Mobility: Walking and moving around  Mobility: Walking and Moving Around Goal Status (): At least 20 percent but less  than 40 percent impaired, limited or restricted  Mobility: Walking and Moving Around Discharge Status (): At least 1 percent but less than 20 percent impaired, limited or restricted     OP PT Discharge Summary  Date of Discharge: 03/19/18  Reason for Discharge: All goals achieved, Independent, Maximum functional potential achieved  Outcomes Achieved: Able to achieve all goals within established timeline, Refer to plan of care for updates on goals achieved  Discharge Destination: Home with home program  Discharge Instructions/Additional Comments: Pt instructed to contact PT with any change in status or any questions regarding HEP.      Derrick Emery, PT DPT  3/19/2018

## 2018-03-23 ENCOUNTER — CLINICAL SUPPORT (OUTPATIENT)
Dept: ORTHOPEDIC SURGERY | Facility: CLINIC | Age: 53
End: 2018-03-23

## 2018-03-23 VITALS — TEMPERATURE: 97.8 F | WEIGHT: 164.6 LBS | HEIGHT: 63 IN | BODY MASS INDEX: 29.16 KG/M2

## 2018-03-23 DIAGNOSIS — M25.562 ACUTE PAIN OF LEFT KNEE: ICD-10-CM

## 2018-03-23 DIAGNOSIS — M22.42 CHONDROMALACIA, PATELLA, LEFT: Primary | ICD-10-CM

## 2018-03-23 DIAGNOSIS — S80.02XD CONTUSION OF LEFT KNEE, SUBSEQUENT ENCOUNTER: ICD-10-CM

## 2018-03-23 PROCEDURE — 99213 OFFICE O/P EST LOW 20 MIN: CPT | Performed by: ORTHOPAEDIC SURGERY

## 2018-03-23 NOTE — PROGRESS NOTES
Patient Name: Santos Witt   YOB: 1965  Referring Primary Care Physician: Sheridan Richardson MD  BMI: Body mass index is 28.8 kg/m².    Chief Complaint:    Chief Complaint   Patient presents with   • Left Knee - Establish Care, Pain        Subjective:    HPI:   Santos Witt is a pleasant 53 y.o. year old who presents today for evaluation of   Chief Complaint   Patient presents with   • Left Knee - Establish Care, Pain   slip and fall injury work related.  Doing better and improving with PT and HEP>  Still with crep.  Tried weaning mobic and had pain but tolerable.  Walks well.  Had list of questions addressed. Visco not approved.  Went over.  This problem is not new to this examiner.     Medications:   Home Medications:  Current Outpatient Prescriptions on File Prior to Visit   Medication Sig   • ALBUTEROL IN Inhale as needed.   • ascorbic acid (VITAMIN C) 500 MG tablet Take  by mouth.   • B Complex Vitamins (VITAMIN B COMPLEX) capsule capsule Take  by mouth Daily.   • Calcium-Magnesium-Vitamin D 400-166.7-133.3 MG-MG-UNIT tablet Take  by mouth.   • Cholecalciferol (VITAMIN D3) 5000 UNITS capsule capsule Take 5,000 Units by mouth daily.   • Evening Primrose Oil 1000 MG capsule Take  by mouth daily.   • fluticasone (FLONASE) 50 MCG/ACT nasal spray 1 spray into each nostril Daily. Administer 2 sprays in each nostril for each dose.    • Fluticasone Furoate-Vilanterol (BREO ELLIPTA IN) Inhale daily.   • GERMANIUM PO Take  by mouth Daily.   • Glucosamine-Chondroitin--400-166 MG tablet Take  by mouth.   • LECITHIN PO Take 1,200 mg by mouth Daily.   • levothyroxine (SYNTHROID, LEVOTHROID) 75 MCG tablet Take 75 mcg by mouth.   • MAGNESIUM PO Take  by mouth Daily.   • meloxicam (MOBIC) 15 MG tablet 1 PO Daily with food.   • montelukast (SINGULAIR) 10 MG tablet Take 10 mg by mouth every night.   • Multiple Vitamin (MULTI-VITAMINS PO) Take  by mouth daily.   • NON FORMULARY Daily. 2% Iodine drops.    • Probiotic capsule Take  by mouth 2 (two) times a day.   • SELENIUM PO Take  by mouth Daily.   • [DISCONTINUED] levothyroxine (SYNTHROID, LEVOTHROID) 50 MCG tablet TAKE 1 TABLET BY MOUTH DAILY     No current facility-administered medications on file prior to visit.      Current Medications:  Scheduled Meds:  Continuous Infusions:  No current facility-administered medications for this visit.   PRN Meds:.    I have reviewed the patient's medical history in detail and updated the computerized patient record.  Review and summarization of old records includes:    Past Medical History:   Diagnosis Date   • Anemia     History of anemia   • Cancer     Breast cancer, T2NM0   • Heart murmur         Past Surgical History:   Procedure Laterality Date   • BREAST CYST EXCISION Left 1982    Fibroid cyst left nipple   • HYSTERECTOMY  06/2011   • KNEE ARTHROSCOPY Left 2006   • PELVIC LAPAROSCOPY  2002    cysts removed        Social History     Occupational History   •       [employer]     Social History Main Topics   • Smoking status: Never Smoker   • Smokeless tobacco: Not on file   • Alcohol use Yes      Comment: Very little   • Drug use: No   • Sexual activity: Not on file      Social History     Social History Narrative   • No narrative on file        Family History   Problem Relation Age of Onset   • Kidney cancer Mother    • Heart disease Mother    • Hypertension Mother    • Hypertension Father    • No Known Problems Sister    • No Known Problems Brother    • Cancer Paternal Aunt    • Cancer Paternal Uncle    • Cancer Paternal Grandfather    • Cancer Paternal Aunt        ROS: 14 point review of systems was performed and all other systems were reviewed and are negative except for documented findings in HPI and today's encounter.     Allergies:   Allergies   Allergen Reactions   • Amoxicillin Nausea And Vomiting   • Ciprofloxacin    • Sulfa Antibiotics      Constitutional:  Denies fever, shaking or  "chills   Eyes:  Denies change in visual acuity   HENT:  Denies nasal congestion or sore throat   Respiratory:  Denies cough or shortness of breath   Cardiovascular:  Denies chest pain or severe LE edema   GI:  Denies abdominal pain, nausea, vomiting, bloody stools or diarrhea   Musculoskeletal:  Numbness, tingling, pain, or loss of motor function only as noted above in history of present illness.  : Denies painful urination or hematuria  Integument:  Denies rash, lesion or ulceration   Neurologic:  Denies headache or focal weakness  Endocrine:  Denies lymphadenopathy  Psych:  Denies confusion or change in mental status   Hem:  Denies active bleeding    Subjective     Objective:    Physical Exam: 53 y.o. female  Wt Readings from Last 3 Encounters:   03/23/18 74.7 kg (164 lb 9.6 oz)   02/09/18 73 kg (161 lb)   01/11/18 73 kg (161 lb)     Ht Readings from Last 3 Encounters:   03/23/18 161 cm (63.39\")   02/09/18 161 cm (63.39\")   01/11/18 161 cm (63.39\")     Body mass index is 28.8 kg/m².  Facility age limit for growth percentiles is 20 years.  Vitals:    03/23/18 0854   Temp: 97.8 °F (36.6 °C)       Vital signs reviewed.   General Appearance:    Alert, cooperative, in no acute distress                  Eyes: conjunctiva clear  ENT: external ears and nose atraumatic  CV: no peripheral edema  Resp: normal respiratory effort  Skin: no rashes or wounds; normal turgor  Psych: mood and affect appropriate  Lymph: no nodes appreciated  Neuro: gross sensation intact  Vascular:  Palpable peripheral pulse in noted extremity  Musculoskeletal Extremities: swelling decreased.  good muscular tone.  crep noted and explained    Radiology:   Imaging Results (last 72 hours)     ** No results found for the last 72 hours. **        Imaging done previously in the office, images were personally viewed and discussed at length with the patient:  Indication: pain related symptoms,  Findings: moderate arthritis, with more narrowing inthe PF " joint  Comparison views: viewed last xray done in the office.       Assessment:     ICD-10-CM ICD-9-CM   1. Chondromalacia, patella, left M22.42 717.7   2. Acute pain of left knee M25.562 719.46   3. Contusion of left knee, subsequent encounter S80.02XD V58.89     924.11        Procedures       Plan: Biomechanics of pertinent body area discussed.  Risks, benefits, alternatives, comparisons, and complications of accepted medicines, injections, recommendations, surgical procedures, and therapies explained and education provided in laymen's terms. The patient was given the opportunity to ask questions and they were answerved to their satisfaction.   Natural history and expected course of this patient's diagnosis discussed along with evaluation of therapies. Questions answered.   Think is at MMI>  Anticipate no ompairment rating.  May have flares that require meds, exercises and or injections intermittently.      3/23/2018

## 2018-06-04 ENCOUNTER — TELEPHONE (OUTPATIENT)
Dept: ONCOLOGY | Facility: CLINIC | Age: 53
End: 2018-06-04

## 2018-06-04 NOTE — TELEPHONE ENCOUNTER
Patient stated she needs to be seen by hematologist asap according to Reynal[facility]. In basket message sent to scheduling.

## 2018-06-04 NOTE — TELEPHONE ENCOUNTER
----- Message from Lindsay Fraire RN sent at 6/4/2018  3:02 PM EDT -----  Patient wants to move up her appointment with  because they said at [facility] needed to be seen soon.

## 2018-06-08 ENCOUNTER — LAB (OUTPATIENT)
Dept: LAB | Facility: HOSPITAL | Age: 53
End: 2018-06-08

## 2018-06-08 ENCOUNTER — OFFICE VISIT (OUTPATIENT)
Dept: ONCOLOGY | Facility: CLINIC | Age: 53
End: 2018-06-08

## 2018-06-08 VITALS
HEART RATE: 64 BPM | RESPIRATION RATE: 16 BRPM | BODY MASS INDEX: 28.28 KG/M2 | OXYGEN SATURATION: 99 % | HEIGHT: 63 IN | SYSTOLIC BLOOD PRESSURE: 134 MMHG | TEMPERATURE: 98.2 F | WEIGHT: 159.6 LBS | DIASTOLIC BLOOD PRESSURE: 86 MMHG

## 2018-06-08 DIAGNOSIS — C50.411 MALIGNANT NEOPLASM OF UPPER-OUTER QUADRANT OF BOTH BREASTS IN FEMALE, ESTROGEN RECEPTOR NEGATIVE (HCC): ICD-10-CM

## 2018-06-08 DIAGNOSIS — Z17.1 MALIGNANT NEOPLASM OF UPPER-OUTER QUADRANT OF BOTH BREASTS IN FEMALE, ESTROGEN RECEPTOR NEGATIVE (HCC): ICD-10-CM

## 2018-06-08 DIAGNOSIS — C50.411 MALIGNANT NEOPLASM OF UPPER-OUTER QUADRANT OF BOTH BREASTS IN FEMALE, ESTROGEN RECEPTOR POSITIVE (HCC): ICD-10-CM

## 2018-06-08 DIAGNOSIS — D47.2 MGUS (MONOCLONAL GAMMOPATHY OF UNKNOWN SIGNIFICANCE): ICD-10-CM

## 2018-06-08 DIAGNOSIS — C50.412 MALIGNANT NEOPLASM OF UPPER-OUTER QUADRANT OF BOTH BREASTS IN FEMALE, ESTROGEN RECEPTOR POSITIVE (HCC): ICD-10-CM

## 2018-06-08 DIAGNOSIS — D47.2 MGUS (MONOCLONAL GAMMOPATHY OF UNKNOWN SIGNIFICANCE): Primary | ICD-10-CM

## 2018-06-08 DIAGNOSIS — Z17.0 MALIGNANT NEOPLASM OF UPPER-OUTER QUADRANT OF BOTH BREASTS IN FEMALE, ESTROGEN RECEPTOR POSITIVE (HCC): ICD-10-CM

## 2018-06-08 DIAGNOSIS — C50.412 MALIGNANT NEOPLASM OF UPPER-OUTER QUADRANT OF BOTH BREASTS IN FEMALE, ESTROGEN RECEPTOR NEGATIVE (HCC): ICD-10-CM

## 2018-06-08 LAB
ALBUMIN SERPL-MCNC: 4.3 G/DL (ref 3.5–5.2)
ALBUMIN/GLOB SERPL: 1.2 G/DL (ref 1.1–2.4)
ALP SERPL-CCNC: 61 U/L (ref 38–116)
ALT SERPL W P-5'-P-CCNC: 16 U/L (ref 0–33)
ANION GAP SERPL CALCULATED.3IONS-SCNC: 11.9 MMOL/L
AST SERPL-CCNC: 23 U/L (ref 0–32)
B2 MICROGLOB SERPL-MCNC: 2.5 MG/L (ref 0.8–2.2)
BASOPHILS # BLD AUTO: 0.02 10*3/MM3 (ref 0–0.1)
BASOPHILS NFR BLD AUTO: 0.4 % (ref 0–1.1)
BILIRUB SERPL-MCNC: 0.5 MG/DL (ref 0.1–1.2)
BUN BLD-MCNC: 15 MG/DL (ref 6–20)
BUN/CREAT SERPL: 16.5 (ref 7.3–30)
CALCIUM SPEC-SCNC: 9.6 MG/DL (ref 8.5–10.2)
CHLORIDE SERPL-SCNC: 102 MMOL/L (ref 98–107)
CO2 SERPL-SCNC: 28.1 MMOL/L (ref 22–29)
CREAT BLD-MCNC: 0.91 MG/DL (ref 0.6–1.1)
DEPRECATED RDW RBC AUTO: 42.1 FL (ref 37–49)
EOSINOPHIL # BLD AUTO: 0.05 10*3/MM3 (ref 0–0.36)
EOSINOPHIL NFR BLD AUTO: 1 % (ref 1–5)
ERYTHROCYTE [DISTWIDTH] IN BLOOD BY AUTOMATED COUNT: 13.2 % (ref 11.7–14.5)
GFR SERPL CREATININE-BSD FRML MDRD: 78 ML/MIN/1.73
GLOBULIN UR ELPH-MCNC: 3.7 GM/DL (ref 1.8–3.5)
GLUCOSE BLD-MCNC: 93 MG/DL (ref 74–124)
HCT VFR BLD AUTO: 36.5 % (ref 34–45)
HGB BLD-MCNC: 11.9 G/DL (ref 11.5–14.9)
IMM GRANULOCYTES # BLD: 0 10*3/MM3 (ref 0–0.03)
IMM GRANULOCYTES NFR BLD: 0 % (ref 0–0.5)
LYMPHOCYTES # BLD AUTO: 1.81 10*3/MM3 (ref 1–3.5)
LYMPHOCYTES NFR BLD AUTO: 37.3 % (ref 20–49)
MCH RBC QN AUTO: 28.4 PG (ref 27–33)
MCHC RBC AUTO-ENTMCNC: 32.6 G/DL (ref 32–35)
MCV RBC AUTO: 87.1 FL (ref 83–97)
MONOCYTES # BLD AUTO: 0.55 10*3/MM3 (ref 0.25–0.8)
MONOCYTES NFR BLD AUTO: 11.3 % (ref 4–12)
NEUTROPHILS # BLD AUTO: 2.42 10*3/MM3 (ref 1.5–7)
NEUTROPHILS NFR BLD AUTO: 50 % (ref 39–75)
NRBC BLD MANUAL-RTO: 0 /100 WBC (ref 0–0)
PLATELET # BLD AUTO: 220 10*3/MM3 (ref 150–375)
PMV BLD AUTO: 9.6 FL (ref 8.9–12.1)
POTASSIUM BLD-SCNC: 4.6 MMOL/L (ref 3.5–4.7)
PROT SERPL-MCNC: 8 G/DL (ref 6.3–8)
RBC # BLD AUTO: 4.19 10*6/MM3 (ref 3.9–5)
SODIUM BLD-SCNC: 142 MMOL/L (ref 134–145)
WBC NRBC COR # BLD: 4.85 10*3/MM3 (ref 4–10)

## 2018-06-08 PROCEDURE — 80053 COMPREHEN METABOLIC PANEL: CPT | Performed by: INTERNAL MEDICINE

## 2018-06-08 PROCEDURE — 82232 ASSAY OF BETA-2 PROTEIN: CPT | Performed by: INTERNAL MEDICINE

## 2018-06-08 PROCEDURE — 85025 COMPLETE CBC W/AUTO DIFF WBC: CPT | Performed by: INTERNAL MEDICINE

## 2018-06-08 PROCEDURE — 99214 OFFICE O/P EST MOD 30 MIN: CPT | Performed by: INTERNAL MEDICINE

## 2018-06-08 PROCEDURE — 36415 COLL VENOUS BLD VENIPUNCTURE: CPT | Performed by: INTERNAL MEDICINE

## 2018-06-08 RX ORDER — ALBUTEROL SULFATE 2.5 MG/3ML
2.5 SOLUTION RESPIRATORY (INHALATION) AS NEEDED
COMMUNITY
Start: 2016-05-15 | End: 2021-06-17

## 2018-06-08 RX ORDER — LEVOTHYROXINE SODIUM 0.05 MG/1
50 TABLET ORAL
COMMUNITY
Start: 2018-05-18 | End: 2018-07-05

## 2018-06-08 RX ORDER — SACCHAROMYCES BOULARDII 250 MG
250 CAPSULE ORAL
COMMUNITY
End: 2022-09-15

## 2018-06-08 NOTE — PROGRESS NOTES
Subjective      REASONS FOR FOLLOWUP:   1. T2N1M0, ER/PA negative, HER2 positive breast cancer.   2. BRCA 1 and 2 negative.   3. Adriamycin/Cytoxan followed by Taxol and Herceptin, started on 11/08/2013.   4. Syncopal episode after cycle #3 with stable echo, chemotherapy continued.   5. FUO after cycle 4 of chemotherapy associated with severe mouth pain, marked elevation of liver function tests and ferritin to 44,000, stable echocardiogram, stable cardiac function, ?etiology, and query drug reaction versus viral infection.   6. Taxol weekly initiated on 02/13/2014. Close followup of LFTs and cardiac function.   7. Echocardiogram 03/05/2014 stable at 63%. Treatment continued with Taxol and Herceptin.   8. Neuropathy grade 2. Taxol decreased 15% on 03/20/2014.   9. Q.3 week Herceptin started 05/23/2014 with plans for radiation consult.   10. Itching with Herceptin. Hydrocortisone and Pepcid added to care plan.   11. Patient seen 07/25/2014 with mild reduction in ejection fraction noted. Fo llowup echocardiogram scheduled prior to next visit month, Herceptin continued with hydrocortisone and Pepcid pre-medications.   12. Further drop in the ejection fraction to 53%. Because of some dizziness and near syncope, treatment with Herceptin held with planned followup in 1 month with repeat echocardiogram. MRI done for headaches was negative.   13. EF up to 64%. Herceptin resumed. Previous echocardiogram felt to be suboptimal due to lack of contrast.   14. Syncopal episode 12/19/2014 with a negative cardiac evaluation. Neurology consult recommended.   15. MRI done for headaches. Negative in August 2014.   16. CT of the abdomen done at [facility] in 10/2014 shows a liver lesion, which was indeterminate. They recommended MRI. We have compared to a previous MRI in 2014 and established this is hemangioma.                     17.  Recurrent syncope workup found incidental IgG kappa monoclonal gammopathy  workup                           ongoing for this in 6/18    History of Present Illness patient is a 52-year-old lady with a node positive HER-2 positive ER negative breast cancer 4.5 years from completion of adjuvant chemotherapy.  She comes in today for follow-up and overall she is doing well but is having intermittent episodes of syncope which sounds vasovagal the last one was in May when she was at a bar drinking with friends .    she's had an echo and a stress test and an EEG and is due for a brain MRI today.  She is currently wearing a event monitor During the workup of these syncopal episodes the neurologist ordered an immunoelectrophoresis which showed an IgG paraprotein of 0.8 g/dL.  Need to evaluate this further but I don't think this is anything to do with her syncope unless she has an autonomic neuropathy but she was sitting down at the time she felt nausea  and had her syncopal episode that does not appear to be orthostatic    She is otherwise doing fairly well with minimal pain in the left groin area other bone pain   She is up-to-date with colonoscopies.    I suggested she stop all her numerous supplements including turmeric , passion floor selenium lecithin glucosamine JGermanium Primrose oil coconut oil black currant seed oil amino acids and see how she does     Active Ambulatory Problems     Diagnosis Date Noted   • Bilateral malignant neoplasm of upper outer quadrant of breast in female 04/15/2016   • Chronic pain of left knee 01/11/2018   • Acute pain of left knee 01/11/2018   • Contusion of left knee 01/11/2018   • Arthritis of left knee 02/09/2018   • Chondromalacia, patella, left 02/09/2018   • MGUS (monoclonal gammopathy of unknown significance) 06/08/2018     Resolved Ambulatory Problems     Diagnosis Date Noted   • No Resolved Ambulatory Problems     Past Medical History:   Diagnosis Date   • Acquired hypothyroidism    • Anemia    • Arthritis    • Cancer 2013   • H/O Chondromalacia  patellae, left    • H/O Regurgitation, mitral/triscupid, mild    • H/O Syncope w/collapse    • Heart murmur    • History of chronic fatigue      SURGICAL HISTORY: Fibroadenoma from the left breast in . Uterine fibroids removed laparoscopically in . Left knee arthroscopy in  and hysterectomy in . Ovaries were left in place.     GYN HISTORY: Menarche age 13.  0. She obviously stopped menstruating at the time of her hysterectomy in  and is having hot flashes.     HEMATOLOGIC/ONCOLOGIC HISTORY:    The  patient initially seen on 10/18/2013, a 48-year-old  female who works as a  at the [employer] ' s office who has a long history of fibrocystic breast disease with multiple ultrasounds and mammograms in the past to evaluate c ysts but after her most recent mammogram, because of some abnormalities, an ultrasound was recommended on 2013 at [facility]. The targeted ultrasound showed 3 circumscribed solid masses in the left breast at the 2 o' clock position, axillar y tail of the breast. The appearance was suggestive of intramammary lymph nodes without a definite fatty hilum which was worrisome for tumor involvement. One of the 3 masses had actually increased in size compared to 2 of the other lesions which were un changed from 2010 suggesting benign etiology. Because of the greater than 20% interval increase in size of one of these nodules since the last mammogram, ultrasound guided biopsy was recommended.   Ultrasound-guided biopsy was done on 2013 and this biopsy showed findings highly suspicious for lymph node involvement by ductal carcinoma and the patient then went to see Dr. Witt who sent her for another biopsy on 2013 and a 1.5 cm mass was seen at the 3 o' clock position of the left breast in luan tion to the 3 abnormal axillary masses which were felt to be lymph nodes. This area was biopsied on 2013 and the path report  showed invasive ductal carcinoma and DCIS grade 3 with the largest focus of invasive cancer being 1 cm and DCIS was interme d iate to high grade. The left axillary lymph node was core biopsied and confirmed metastatic carcinoma. ER/WV were negative, HER2 was 3+ positive. This led to an MRI of the breast 08/27/2013 which showed 3 cm mass in the lower outer quadrant of the left breast and additional clump enhancement in the middle and anterior third of the left breast at the 3 o' clock axis suspicious for in situ carcinoma and 3 rounded masses measured up to 9 mm in greatest dimension are noted consistent with lymph nodes. One ha d a clip from the recent biopsy. Right breast showed stippled foci of variable enhancement with no dominant or clumped findings, prominent right axillary lymph nodes were noted and right axillary lymph node biopsy was done. This was nondiagnostic.   The patient underwent bone scan on 09/09/2013 which was unremarkable except for some increased activity at T9 and the right part of L3 which was again felt to be nonspecific but plain films were recommended. CT scan of the chest, abdomen and pelvis was done which was also unremarkable except for prominent intramammary nodes in the upper outer left breast and several prominent axillary lymph nodes bilaterally one of which has been biopsied on the right. Appearance raised the concern of possible metastatic to both axillae but there were no other sites of involvement. Bone density dated 09/20/2013 was normal. Patient then went for surgery on 09/25/2013 at which time she underwent right mastectomy which was unremarkable and left total mastectomy with axillary dissection which showed a 2.9 cm grade 3 infiltrating ductal carcinoma with associated DCIS, margins were uninvolved. Three of 21 lymph nodes showed metastatic disease making this a pT2N1.   The patient has done well from surgery but has had a lot of prob lems with anxiety and hot flashes but  otherwise has done well. She went to see Dr. Cedeno for recommendations and then came to see us for further discussion of treatment options. In addition, the patient had BRCA testing which thankfully was negative.   T he patient had a MUGA scan, which showed an ejection fraction of 50% and no other abnormalities. Her plain films of the abnormal spots on her bone scan were negative. She had port placed and is ready to start treatment. 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   When she had CT scans of the chest, abdomen and pelvis, they were unremarkable except for hemangioma in the liver and prominent intramammary nodes in the upper outer quadrant of the left breast with several prominent axillary nodes bilaterally.   The patient started dose-dense AC with fairly good tolerance except for some mucositis after cycle 4. She had a febrile illness that required hospitalization from 01/02/2014 - 01/15/2014 with a fever of unknown origin. Multiple viral cultures were negative including CMV, EBV, herpes, respiratory panel and hepatiti s profile was negative. Mouth multiple blood and urine cultures and throat cultures were negative except for Haemophilus parainfluenza for which a course of amoxicillin was given. During the hospitalization the patient spiked fevers continuously and then developed rapid elevation of her liver tests with a ferritin which went up as high as 44,000. The patient defervesced finally and the liver profile started coming down. Echo with bubble echo during her stay was normal with ejection fraction of 60%. The p atient was discharged home with plans to resume chemotherapy cautiously once her numbers stabilized.   The patient was seen on 01/22/2014 feeling much better. She still is not very active, feeling  fatigued and deconditioned from lying in bed for almost 2 we eks. The decision was made because of borderline blood pressure of 94/70 and some mild tachycardia to have her be more active and exercise for another week and reevaluate to start Herceptin on 01/30/2014 with Taxol to be added after 2 doses of Herceptin i f she is tolerating the treatment well and her liver functions are normal. A bone marrow biopsy was also done during her hospital stay and no signs of hemophagocytosis or dysplasia although there was some aberrant myeloid antigen of uncertain significanc e which has been seen with myelodysplasia. She was also seen briefly by Dr. Fatmata Tolbert of Rheumatology while in the hospital for low positive ARTEMIO of 1:160 and anti-SSA antibody greater than 8 but Dr. Tolbert felt this was unlikely to be rheumatologica l disease because 4 doses of Cytoxan should not have precipitated this.   The patient initiated weekly Herceptin for 2 weeks with no significant problems. Taxol weekly initiated on 02/13/2014 with close followup of LFTs and cardiac function.   The patient was seen on 03/06/2014 with echo showing EF of 63%. We will continue Taxol and Herceptin weekly and watch her LFTs closely. Neurontin causes dizziness and she is not taking it. We will hold off on Effexor for the time being.   Patient required Neupogen for 3 doses because of an ANC of 1.06 with dose 4 and Neupogen x3 is added for each dose.   The patient is seen 03/20/2014 doing fairly well. Taxol dose decreased to 15% because of neuropathy. Neupogen continued.   The patient was seen on 05/23/2014 doing well. Herceptin continued with plans to check an echocardiogram before her next treatment in 3 weeks.   MRI of the liver confirmed hemangioma. Echocardiogram is stable at 59%. Herceptin continued at 3-week intervals with the addition of Pepcid and Solu-Cortef 50 mg and the patient is almost certainly going to agree to radiation for her 3 node status,  especially since the pathologist at [facility] thought there was extranodal extension.   The patient was seen 07/25/2014. Echocardiogram results noted, slightly reduced from previous, the patient proceeding through radiation therapy of left chest wall. Followup echocardiogram scheduled after her Herceptin therapy 07/25/2014.   Patient was seen on 08/14/2014 with EF of 53%. I discussed th e case with Dr. Bebe Fernandez and did not use the definitive contrast and this may explain the differences, but based on the fact that she had some near syncope on one occasion this weekend and the fact that she has had radiation to the left chest area with a significant skin burn, we will hold Herceptin today and bring her back in 4 weeks, but check her LFTs and ferritin again today.   The patient was 11/14/2014 doing well with ejection fraction of 63% and echocardiogram was reviewed. Herceptin will be continued until mid-January.   The patient is seen 12/29/2014. She had a syncopal episode 12/19/2014. ER workup was negative and neurological evaluation was ordered. Bone scan ordered for right hip pain. If this is negative she will have her port removed in J anuary and she is planning to delay reconstruction for a year.   The patient was evaluated for some pain in her back with a bone scan which was essentially unremarkable except for some degenerative changes at L3, and T6-7-8. Because we were concerned, we ordered an MRI of the thoracic and lumbar spine which was benign with just DJD. No further followup is needed at this time with regard to scans.   Patient seen on 05/08/2015, doing well; occasional migraine-type headache persists. Port has been removed. She is thinking about reconstruction sometime at the end of the year.   CT of the abdomen done at [facility] in 10/2014 shows a liver lesion, which was indeterminate. They recommended MRI. We have compared to a previous MRI in 2014 and established  this is hemangioma.   Patient seen on 2015 having had CT scans of the chest and abdo men at [facility] for some abdominal discomfort that showed a 3 cm lesion in the dome of the liver, which they were worried about and recommended an MRI, but we reviewed the scans and also compared to the previous MRI of the liver d one in 2014 in October and confirmed that this indeed was a hemangioma that has been stable since . An echo was also done which showed an ejection fraction of 60% and stable.      patient is a 51-year-old  female with an ER/MS negative HER-2 positive breast cancer node positive T2 N1 M0 treated with Adriamycin and Cytoxan followed by Taxol Herceptin was completed in late  .  He is here for routine follow-up and overall she is doing well she was diagnosed with asthma and  is on an inhaler with good improvement.  She has mild hot flashes but has mood swings and menopausal symptoms as reported into menopause with chemotherapy.  She has no unusual aches or pains but otherwise is doing well and working full-time.  She is not interested in reconstruction at this point.  We did discuss doing extended genetic testing because of her family history we referred her for the extended testing but she has not done that yet She did have a screening colonoscopy and this was negative.  DEXA scan was normal in April of this year.  She is still very anxious about recurrence which is understandable but getting better with time.        SOCIAL HISTORY: Single. . She does not smoke. Drinks very little. No drug history.     FAMILY HISTORY: Both of her parents are in their 60s. Mother had kidney cancer and had a partial kidney resection. Brother  of head trauma, another is healthy. She has two sisters who are healthy. She has 2 paternal aunts with breast cancer; one is alive in her 70s, the other  at 48 of breast cancer. A paternal uncle with kidney  "cancer. Paternal grandfather with colon cancer in his 60s. Multiple paternal cousins with cancer inc luding leukemia and kidney cancer. No other history of breast or ovarian cancer in the family.     Review of Systems   Constitutional: Positive for fatigue.   Psychiatric/Behavioral: The patient is nervous/anxious.       A comprehensive 14 point review of systems was performed and was negative except as mentioned.    Medications:  The current medication list was reviewed in the EMR    ALLERGIES:    Allergies   Allergen Reactions   • Amoxicillin Nausea And Vomiting   • Ciprofloxacin    • Sulfa Antibiotics        Objective      Vitals:    06/08/18 1111   BP: 134/86   Pulse: 64   Resp: 16   Temp: 98.2 °F (36.8 °C)   TempSrc: Oral   SpO2: 99%   Weight: 72.4 kg (159 lb 9.6 oz)   Height: 161 cm (63.39\")   PainSc:   3   PainLoc: Generalized     Current Status 6/8/2018   ECOG score 0       Physical Exam    GENERAL:  Well-developed, well-nourished in no acute distress.   SKIN:  Warm, dry without rashes, purpura or petechiae.  HEAD:  Normocephalic.  EYES:  Pupils equal, round and reactive to light.  EOMs intact.  Conjunctivae normal.  EARS:  Hearing intact.  NOSE:  Septum midline.  No excoriations or nasal discharge.  MOUTH:  Tongue is well-papillated; no stomatitis or ulcers.  Lips normal.  THROAT:  Oropharynx without lesions or exudates.  NECK:  Supple with good range of motion; no thyromegaly or masses, no JVD.  LYMPHATICS:  No cervical, supraclavicular, axillary or inguinal adenopathy.  CHEST:  Lungs clear to percussion and auscultation. Good airflow.  BREASTS: Bilateral chest wall is benign with no axillary adenopathy  CARDIAC:  Regular rate and rhythm without murmurs, rubs or gallops. Normal S1,S2.  ABDOMEN:  Soft, nontender with no organomegaly or masses.  EXTREMITIES:  No clubbing, cyanosis or edema.  NEUROLOGICAL:  Cranial Nerves II-XII grossly intact.  No focal neurological deficits.  PSYCHIATRIC:  Normal affect and " mood.        RECENT LABS:  Hematology WBC   Date Value Ref Range Status   06/08/2018 4.85 4.00 - 10.00 10*3/mm3 Final     RBC   Date Value Ref Range Status   06/08/2018 4.19 3.90 - 5.00 10*6/mm3 Final     Hemoglobin   Date Value Ref Range Status   06/08/2018 11.9 11.5 - 14.9 g/dL Final     Hematocrit   Date Value Ref Range Status   06/08/2018 36.5 34.0 - 45.0 % Final     Platelets   Date Value Ref Range Status   06/08/2018 220 150 - 375 10*3/mm3 Final   2D echo and stress echo normal in 5 /18    Assessment/Plan   1.  T2 N1 ER/MA negative HER-2 positive left breast cancer 2013 treated with Adriamycin Cytoxan Taxol Herceptin and bilateral mastectomies plus radiation 4.5 Years from diagnosis  2.  BRCA negative  3.  Hemangioma of the liver  4.  family history of malignancy  5.  Mild chronic anemia  6.  Anxiety intolerant of Effexor ?  Zyprexa 2.5 mg   7. Recurrent syncope without obvious precipitating cause workup underway  8.  Incidental finding of a monoclonal gammopathy IgG kappa workup ongoing    Plan   1.skeletal survey 24-hour urine and immunofixation plus beta-2 microglobulin   2. return in 3-4 weeks to review the results            6/8/2018      CC:

## 2018-06-11 LAB
ALBUMIN SERPL-MCNC: 4.1 G/DL (ref 2.9–4.4)
ALBUMIN/GLOB SERPL: 1.2 {RATIO} (ref 0.7–1.7)
ALPHA1 GLOB FLD ELPH-MCNC: 0.2 G/DL (ref 0–0.4)
ALPHA2 GLOB SERPL ELPH-MCNC: 0.7 G/DL (ref 0.4–1)
B-GLOBULIN SERPL ELPH-MCNC: 1.1 G/DL (ref 0.7–1.3)
GAMMA GLOB SERPL ELPH-MCNC: 1.7 G/DL (ref 0.4–1.8)
GLOBULIN SER CALC-MCNC: 3.7 G/DL (ref 2.2–3.9)
IGA SERPL-MCNC: 158 MG/DL (ref 87–352)
IGG SERPL-MCNC: 1685 MG/DL (ref 700–1600)
IGM SERPL-MCNC: 58 MG/DL (ref 26–217)
INTERPRETATION SERPL IEP-IMP: ABNORMAL
KAPPA LC SERPL-MCNC: 39.6 MG/L (ref 3.3–19.4)
KAPPA LC/LAMBDA SER: 3.05 {RATIO} (ref 0.26–1.65)
LAMBDA LC FREE SERPL-MCNC: 13 MG/L (ref 5.7–26.3)
Lab: ABNORMAL
M-SPIKE: 0.9 G/DL
PROT SERPL-MCNC: 7.8 G/DL (ref 6–8.5)

## 2018-06-20 LAB
ALBUMIN 24H MFR UR ELPH: 41.1 %
ALPHA1 GLOB 24H MFR UR ELPH: 7.4 %
ALPHA2 GLOB 24H MFR UR ELPH: 13.1 %
B-GLOBULIN MFR UR ELPH: 15.7 %
FREE KAPPA LT CHAINS, 24HR: 9 MG/24 HR
FREE LAMBDA LT CHAINS, 24 HR: 0 MG/24 HR
GAMMA GLOB 24H MFR UR ELPH: 22.7 %
HIV 1 & 2 AB SER-IMP: NORMAL
INTERPRETATION UR IFE-IMP: NORMAL
KAPPA LC UR-MCNC: 3.1 MG/L (ref 1.35–24.19)
KAPPA LC/LAMBDA UR: 28.18 {RATIO} (ref 2.04–10.37)
LAMBDA LC UR-MCNC: 0.11 MG/L (ref 0.24–6.66)
M PROTEIN 24H MFR UR ELPH: NORMAL %
PROT 24H UR-MRATE: <122 MG/24 HR (ref 30–150)
PROT UR-MCNC: <4 MG/DL

## 2018-06-24 ENCOUNTER — HOSPITAL ENCOUNTER (OUTPATIENT)
Dept: GENERAL RADIOLOGY | Facility: HOSPITAL | Age: 53
Discharge: HOME | End: 2018-06-24
Attending: INTERNAL MEDICINE | Admitting: INTERNAL MEDICINE

## 2018-06-24 DIAGNOSIS — D47.2 MGUS (MONOCLONAL GAMMOPATHY OF UNKNOWN SIGNIFICANCE): ICD-10-CM

## 2018-06-24 DIAGNOSIS — Z17.0 MALIGNANT NEOPLASM OF UPPER-OUTER QUADRANT OF BOTH BREASTS IN FEMALE, ESTROGEN RECEPTOR POSITIVE (HCC): ICD-10-CM

## 2018-06-24 DIAGNOSIS — C50.412 MALIGNANT NEOPLASM OF UPPER-OUTER QUADRANT OF BOTH BREASTS IN FEMALE, ESTROGEN RECEPTOR POSITIVE (HCC): ICD-10-CM

## 2018-06-24 DIAGNOSIS — C50.411 MALIGNANT NEOPLASM OF UPPER-OUTER QUADRANT OF BOTH BREASTS IN FEMALE, ESTROGEN RECEPTOR POSITIVE (HCC): ICD-10-CM

## 2018-06-24 PROCEDURE — 77075 RADEX OSSEOUS SURVEY COMPL: CPT

## 2018-06-27 ENCOUNTER — OFFICE (AMBULATORY)
Dept: URBAN - METROPOLITAN AREA CLINIC 75 | Facility: CLINIC | Age: 53
End: 2018-06-27

## 2018-06-27 VITALS
WEIGHT: 159 LBS | SYSTOLIC BLOOD PRESSURE: 128 MMHG | DIASTOLIC BLOOD PRESSURE: 80 MMHG | HEART RATE: 78 BPM | HEIGHT: 63 IN

## 2018-06-27 DIAGNOSIS — R19.5 OTHER FECAL ABNORMALITIES: ICD-10-CM

## 2018-06-27 DIAGNOSIS — R14.2 ERUCTATION: ICD-10-CM

## 2018-06-27 DIAGNOSIS — R19.4 CHANGE IN BOWEL HABIT: ICD-10-CM

## 2018-06-27 DIAGNOSIS — Z80.0 FAMILY HISTORY OF MALIGNANT NEOPLASM OF DIGESTIVE ORGANS: ICD-10-CM

## 2018-06-27 DIAGNOSIS — R19.7 DIARRHEA, UNSPECIFIED: ICD-10-CM

## 2018-06-27 PROCEDURE — 99244 OFF/OP CNSLTJ NEW/EST MOD 40: CPT | Performed by: INTERNAL MEDICINE

## 2018-07-03 ENCOUNTER — APPOINTMENT (OUTPATIENT)
Dept: LAB | Facility: HOSPITAL | Age: 53
End: 2018-07-03

## 2018-07-03 ENCOUNTER — APPOINTMENT (OUTPATIENT)
Dept: ONCOLOGY | Facility: CLINIC | Age: 53
End: 2018-07-03

## 2018-07-05 ENCOUNTER — LAB (OUTPATIENT)
Dept: LAB | Facility: HOSPITAL | Age: 53
End: 2018-07-05

## 2018-07-05 ENCOUNTER — OFFICE VISIT (OUTPATIENT)
Dept: ONCOLOGY | Facility: CLINIC | Age: 53
End: 2018-07-05

## 2018-07-05 VITALS
HEART RATE: 75 BPM | HEIGHT: 63 IN | RESPIRATION RATE: 14 BRPM | DIASTOLIC BLOOD PRESSURE: 90 MMHG | WEIGHT: 164.2 LBS | TEMPERATURE: 98.8 F | OXYGEN SATURATION: 99 % | SYSTOLIC BLOOD PRESSURE: 150 MMHG | BODY MASS INDEX: 29.09 KG/M2

## 2018-07-05 DIAGNOSIS — D47.2 MGUS (MONOCLONAL GAMMOPATHY OF UNKNOWN SIGNIFICANCE): Primary | ICD-10-CM

## 2018-07-05 DIAGNOSIS — C50.412 MALIGNANT NEOPLASM OF UPPER-OUTER QUADRANT OF BOTH BREASTS IN FEMALE, ESTROGEN RECEPTOR POSITIVE (HCC): Primary | ICD-10-CM

## 2018-07-05 DIAGNOSIS — Z17.0 MALIGNANT NEOPLASM OF UPPER-OUTER QUADRANT OF BOTH BREASTS IN FEMALE, ESTROGEN RECEPTOR POSITIVE (HCC): Primary | ICD-10-CM

## 2018-07-05 DIAGNOSIS — C50.411 MALIGNANT NEOPLASM OF UPPER-OUTER QUADRANT OF BOTH BREASTS IN FEMALE, ESTROGEN RECEPTOR POSITIVE (HCC): Primary | ICD-10-CM

## 2018-07-05 LAB
BASOPHILS # BLD AUTO: 0.02 10*3/MM3 (ref 0–0.1)
BASOPHILS NFR BLD AUTO: 0.3 % (ref 0–1.1)
DEPRECATED RDW RBC AUTO: 40.5 FL (ref 37–49)
EOSINOPHIL # BLD AUTO: 0.08 10*3/MM3 (ref 0–0.36)
EOSINOPHIL NFR BLD AUTO: 1.3 % (ref 1–5)
ERYTHROCYTE [DISTWIDTH] IN BLOOD BY AUTOMATED COUNT: 13.2 % (ref 11.7–14.5)
HCT VFR BLD AUTO: 36.1 % (ref 34–45)
HGB BLD-MCNC: 12.2 G/DL (ref 11.5–14.9)
IMM GRANULOCYTES # BLD: 0.01 10*3/MM3 (ref 0–0.03)
IMM GRANULOCYTES NFR BLD: 0.2 % (ref 0–0.5)
LYMPHOCYTES # BLD AUTO: 2.03 10*3/MM3 (ref 1–3.5)
LYMPHOCYTES NFR BLD AUTO: 33.4 % (ref 20–49)
MCH RBC QN AUTO: 28.5 PG (ref 27–33)
MCHC RBC AUTO-ENTMCNC: 33.8 G/DL (ref 32–35)
MCV RBC AUTO: 84.3 FL (ref 83–97)
MONOCYTES # BLD AUTO: 0.72 10*3/MM3 (ref 0.25–0.8)
MONOCYTES NFR BLD AUTO: 11.8 % (ref 4–12)
NEUTROPHILS # BLD AUTO: 3.22 10*3/MM3 (ref 1.5–7)
NEUTROPHILS NFR BLD AUTO: 53 % (ref 39–75)
NRBC BLD MANUAL-RTO: 0 /100 WBC (ref 0–0)
PLATELET # BLD AUTO: 228 10*3/MM3 (ref 150–375)
PMV BLD AUTO: 9.8 FL (ref 8.9–12.1)
RBC # BLD AUTO: 4.28 10*6/MM3 (ref 3.9–5)
WBC NRBC COR # BLD: 6.08 10*3/MM3 (ref 4–10)

## 2018-07-05 PROCEDURE — 36415 COLL VENOUS BLD VENIPUNCTURE: CPT | Performed by: INTERNAL MEDICINE

## 2018-07-05 PROCEDURE — 99214 OFFICE O/P EST MOD 30 MIN: CPT | Performed by: INTERNAL MEDICINE

## 2018-07-05 PROCEDURE — 85025 COMPLETE CBC W/AUTO DIFF WBC: CPT | Performed by: INTERNAL MEDICINE

## 2018-07-05 RX ORDER — LEVOTHYROXINE SODIUM 50 UG/1
50 CAPSULE ORAL DAILY
COMMUNITY
End: 2018-09-07 | Stop reason: SDUPTHER

## 2018-07-05 NOTE — PROGRESS NOTES
Subjective      REASONS FOR FOLLOWUP:   1. T2N1M0, ER/WI negative, HER2 positive breast cancer.   2. BRCA 1 and 2 negative.   3. Adriamycin/Cytoxan followed by Taxol and Herceptin, started on 11/08/2013.   4. Syncopal episode after cycle #3 with stable echo, chemotherapy continued.   5. FUO after cycle 4 of chemotherapy associated with severe mouth pain, marked elevation of liver function tests and ferritin to 44,000, stable echocardiogram, stable cardiac function, ?etiology, and query drug reaction versus viral infection.   6. Taxol weekly initiated on 02/13/2014. Close followup of LFTs and cardiac function.   7. Echocardiogram 03/05/2014 stable at 63%. Treatment continued with Taxol and Herceptin.   8. Neuropathy grade 2. Taxol decreased 15% on 03/20/2014.   9. Q.3 week Herceptin started 05/23/2014 with plans for radiation consult.   10. Itching with Herceptin. Hydrocortisone and Pepcid added to care plan.   11. Patient seen 07/25/2014 with mild reduction in ejection fraction noted. Fo llowup echocardiogram scheduled prior to next visit month, Herceptin continued with hydrocortisone and Pepcid pre-medications.   12. Further drop in the ejection fraction to 53%. Because of some dizziness and near syncope, treatment with Herceptin held with planned followup in 1 month with repeat echocardiogram. MRI done for headaches was negative.   13. EF up to 64%. Herceptin resumed. Previous echocardiogram felt to be suboptimal due to lack of contrast.   14. Syncopal episode 12/19/2014 with a negative cardiac evaluation. Neurology consult recommended.   15. MRI done for headaches. Negative in August 2014.   16. CT of the abdomen done at [facility] in 10/2014 shows a liver lesion, which was indeterminate. They recommended MRI. We have compared to a previous MRI in 2014 and established this is hemangioma.                     17.  Recurrent syncope workup found incidental IgG kappa monoclonal gammopathy                                           Workup ongoing for this in 6/18    History of Present Illness patient is a 52-year-old lady with a node positive HER-2 positive ER negative breast cancer 4.5 years from completion of adjuvant chemotherapy.  She comes in today for follow-up and overall she is doing well but is having intermittent episodes of syncope which sounds vasovagal the last one was in May when she was at a bar drinking with friends .    she's had an echo and a stress test and an EEG andbrain MRI  which was reportedly benign.  She is currently wearing a event monitor During the workup of these syncopal episodes the neurologist ordered an immunoelectrophoresis which showed an IgG paraprotein of 0.8 g/dL.  Need to evaluate this further but I don't think this is anything to do with her syncope unless she has an autonomic neuropathy but she was sitting down at the time she felt nausea  and had her syncopal episode that does not appear to be orthostatic    She is otherwise doing fairly well with minimal pain in the left groin area other bone pain   She is up-to-date with colonoscopies.    I suggested she stop all her numerous supplements including turmeric , passion floor selenium lecithin glucosamine JGermanium Primrose oil coconut oil black currant seed oil amino acids and see how she does   We reviewed her skeletal survey which is essentially negative her urine did not show any significant Bence Witt protein she has a 0.9 g/dL M protein with a mildly elevated light chain ratio and a mildly elevated beta-2 microglobulin of 2.5  I recommend a bone marrow biopsy and staining for amyloid and if her cardiac workup is negative and a bone marrow is negative I have recommended she go to the [facility] to see if they can figure it out --- she is very anxious about the syncopal episodes    Active Ambulatory Problems     Diagnosis Date Noted   • Bilateral malignant neoplasm of upper outer quadrant of breast in  female (CMS/[facility]) 04/15/2016   • Chronic pain of left knee 2018   • Acute pain of left knee 2018   • Contusion of left knee 2018   • Arthritis of left knee 2018   • Chondromalacia, patella, left 2018   • MGUS (monoclonal gammopathy of unknown significance) 2018     Resolved Ambulatory Problems     Diagnosis Date Noted   • No Resolved Ambulatory Problems     Past Medical History:   Diagnosis Date   • Acquired hypothyroidism    • Anemia    • Arthritis    • Cancer (CMS/[facility])    • H/O Chondromalacia patellae, left    • H/O Regurgitation, mitral/triscupid, mild    • H/O Syncope w/collapse    • Heart murmur    • History of chronic fatigue      SURGICAL HISTORY: Fibroadenoma from the left breast in . Uterine fibroids removed laparoscopically in . Left knee arthroscopy in  and hysterectomy in . Ovaries were left in place.     GYN HISTORY: Menarche age 13.  0. She obviously stopped menstruating at the time of her hysterectomy in  and is having hot flashes.     HEMATOLOGIC/ONCOLOGIC HISTORY:    The  patient initially seen on 10/18/2013, a 48-year-old  female who works as a  at the [place] [place] who has a long history of fibrocystic breast disease with multiple ultrasounds and mammograms in the past to evaluate c ysts but after her most recent mammogram, because of some abnormalities, an ultrasound was recommended on 2013 at [facility]. The targeted ultrasound showed 3 circumscribed solid masses in the left breast at the 2 o' clock position, axillar y tail of the breast. The appearance was suggestive of intramammary lymph nodes without a definite fatty hilum which was worrisome for tumor involvement. One of the 3 masses had actually increased in size compared to 2 of the other lesions which were un changed from 2010 suggesting benign etiology. Because of the greater than 20% interval increase in size of one  of these nodules since the last mammogram, ultrasound guided biopsy was recommended.   Ultrasound-guided biopsy was done on 08/01/2013 and this biopsy showed findings highly suspicious for lymph node involvement by ductal carcinoma and the patient then went to see Dr. Witt who sent her for another biopsy on 08/19/2013 and a 1.5 cm mass was seen at the 3 o' clock position of the left breast in luan tion to the 3 abnormal axillary masses which were felt to be lymph nodes. This area was biopsied on 08/19/2013 and the path report showed invasive ductal carcinoma and DCIS grade 3 with the largest focus of invasive cancer being 1 cm and DCIS was interme d iate to high grade. The left axillary lymph node was core biopsied and confirmed metastatic carcinoma. ER/GA were negative, HER2 was 3+ positive. This led to an MRI of the breast 08/27/2013 which showed 3 cm mass in the lower outer quadrant of the left breast and additional clump enhancement in the middle and anterior third of the left breast at the 3 o' clock axis suspicious for in situ carcinoma and 3 rounded masses measured up to 9 mm in greatest dimension are noted consistent with lymph nodes. One ha d a clip from the recent biopsy. Right breast showed stippled foci of variable enhancement with no dominant or clumped findings, prominent right axillary lymph nodes were noted and right axillary lymph node biopsy was done. This was nondiagnostic.   The patient underwent bone scan on 09/09/2013 which was unremarkable except for some increased activity at T9 and the right part of L3 which was again felt to be nonspecific but plain films were recommended. CT scan of the chest, abdomen and pelvis was done which was also unremarkable except for prominent intramammary nodes in the upper outer left breast and several prominent axillary lymph nodes bilaterally one of which has been biopsied on the right. Appearance raised the concern of possible metastatic to both axillae but  there were no other sites of involvement. Bone density dated 09/20/2013 was normal. Patient then went for surgery on 09/25/2013 at which time she underwent right mastectomy which was unremarkable and left total mastectomy with axillary dissection which showed a 2.9 cm grade 3 infiltrating ductal carcinoma with associated DCIS, margins were uninvolved. Three of 21 lymph nodes showed metastatic disease making this a pT2N1.   The patient has done well from surgery but has had a lot of prob lems with anxiety and hot flashes but otherwise has done well. She went to see Dr. Cedeno for recommendations and then came to see us for further discussion of treatment options. In addition, the patient had BRCA testing which thankfully was negative.   T he patient had a MUGA scan, which showed an ejection fraction of 50% and no other abnormalities. Her plain films of the abnormal spots on her bone scan were negative. She had port placed and is ready to start treatment. 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   When she had CT scans of the chest, abdomen and pelvis, they were unremarkable except for hemangioma in the liver and prominent intramammary nodes in the upper outer quadrant of the left breast with several prominent axillary nodes bilaterally.   The patient started dose-dense AC with fairly good tolerance except for some mucositis after cycle 4. She had a febrile illness that required hospitalization from 01/02/2014 - 01/15/2014 with a fever of unknown origin. Multiple viral cultures were negative including CMV, EBV, herpes, respiratory panel and hepatiti s profile was negative. Mouth multiple blood and urine cultures and throat cultures were negative except for Haemophilus parainfluenza for which a course of amoxicillin was  given. During the hospitalization the patient spiked fevers continuously and then developed rapid elevation of her liver tests with a ferritin which went up as high as 44,000. The patient defervesced finally and the liver profile started coming down. Echo with bubble echo during her stay was normal with ejection fraction of 60%. The p pablo was discharged home with plans to resume chemotherapy cautiously once her numbers stabilized.   The patient was seen on 01/22/2014 feeling much better. She still is not very active, feeling fatigued and deconditioned from lying in bed for almost 2 we eks. The decision was made because of borderline blood pressure of 94/70 and some mild tachycardia to have her be more active and exercise for another week and reevaluate to start Herceptin on 01/30/2014 with Taxol to be added after 2 doses of Herceptin i f she is tolerating the treatment well and her liver functions are normal. A bone marrow biopsy was also done during her hospital stay and no signs of hemophagocytosis or dysplasia although there was some aberrant myeloid antigen of uncertain significanc e which has been seen with myelodysplasia. She was also seen briefly by Dr. Fatmata Tolbert of Rheumatology while in the hospital for low positive ARTEMIO of 1:160 and anti-SSA antibody greater than 8 but Dr. Tolbert felt this was unlikely to be rheumatologica l disease because 4 doses of Cytoxan should not have precipitated this.   The patient initiated weekly Herceptin for 2 weeks with no significant problems. Taxol weekly initiated on 02/13/2014 with close followup of LFTs and cardiac function.   The patient was seen on 03/06/2014 with echo showing EF of 63%. We will continue Taxol and Herceptin weekly and watch her LFTs closely. Neurontin causes dizziness and she is not taking it. We will hold off on Effexor for the time being.   Patient required Neupogen for 3 doses because of an ANC of 1.06 with dose 4 and Neupogen x3 is added  for each dose.   The patient is seen 03/20/2014 doing fairly well. Taxol dose decreased to 15% because of neuropathy. Neupogen continued.   The patient was seen on 05/23/2014 doing well. Herceptin continued with plans to check an echocardiogram before her next treatment in 3 weeks.   MRI of the liver confirmed hemangioma. Echocardiogram is stable at 59%. Herceptin continued at 3-week intervals with the addition of Pepcid and Solu-Cortef 50 mg and the patient is almost certainly going to agree to radiation for her 3 node status, especially since the pathologist at [facility] thought there was extranodal extension.   The patient was seen 07/25/2014. Echocardiogram results noted, slightly reduced from previous, the patient proceeding through radiation therapy of left chest wall. Followup echocardiogram scheduled after her Herceptin therapy 07/25/2014.   Patient was seen on 08/14/2014 with EF of 53%. I discussed th e case with Dr. Bebe Fernandez and did not use the definitive contrast and this may explain the differences, but based on the fact that she had some near syncope on one occasion this weekend and the fact that she has had radiation to the left chest area with a significant skin burn, we will hold Herceptin today and bring her back in 4 weeks, but check her LFTs and ferritin again today.   The patient was 11/14/2014 doing well with ejection fraction of 63% and echocardiogram was reviewed. Herceptin will be continued until mid-January.   The patient is seen 12/29/2014. She had a syncopal episode 12/19/2014. ER workup was negative and neurological evaluation was ordered. Bone scan ordered for right hip pain. If this is negative she will have her port removed in J anuary and she is planning to delay reconstruction for a year.   The patient was evaluated for some pain in her back with a bone scan which was essentially unremarkable except for some degenerative changes at L3, and T6-7-8. Because we were  concerned, we ordered an MRI of the thoracic and lumbar spine which was benign with just DJD. No further followup is needed at this time with regard to scans.   Patient seen on 05/08/2015, doing well; occasional migraine-type headache persists. Port has been removed. She is thinking about reconstruction sometime at the end of the year.   CT of the abdomen done at [facility] in 10/2014 shows a liver lesion, which was indeterminate. They recommended MRI. We have compared to a previous MRI in 2014 and established this is hemangioma.   Patient seen on 12/18/2015 having had CT scans of the chest and abdo men at [facility] for some abdominal discomfort that showed a 3 cm lesion in the dome of the liver, which they were worried about and recommended an MRI, but we reviewed the scans and also compared to the previous MRI of the liver d one in 2014 in October and confirmed that this indeed was a hemangioma that has been stable since 2013. An echo was also done which showed an ejection fraction of 60% and stable.   7/17   patient is a 51-year-old  female with an ER/MD negative HER-2 positive breast cancer node positive T2 N1 M0 treated with Adriamycin and Cytoxan followed by Taxol Herceptin was completed in late 2014 .  He is here for routine follow-up and overall she is doing well she was diagnosed with asthma and  is on an inhaler with good improvement.  She has mild hot flashes but has mood swings and menopausal symptoms as reported into menopause with chemotherapy.  She has no unusual aches or pains but otherwise is doing well and working full-time.  She is not interested in reconstruction at this point.  We did discuss doing extended genetic testing because of her family history we referred her for the extended testing but she has not done that yet She did have a screening colonoscopy and this was negative.  DEXA scan was normal in April of this year.  She is  "still very anxious about recurrence which is understandable but getting better with time.    0.9 g IgG kappa MGUS noted the negative skeletal survey and urine protein.  Bone marrow planned workup for recurrent syncope underway per cardiology      SOCIAL HISTORY: Single. . She does not smoke. Drinks very little. No drug history.     FAMILY HISTORY: Both of her parents are in their 60s. Mother had kidney cancer and had a partial kidney resection. Brother  of head trauma, another is healthy. She has two sisters who are healthy. She has 2 paternal aunts with breast cancer; one is alive in her 70s, the other  at 48 of breast cancer. A paternal uncle with kidney cancer. Paternal grandfather with colon cancer in his 60s. Multiple paternal cousins with cancer inc luding leukemia and kidney cancer. No other history of breast or ovarian cancer in the family.     Review of Systems   Constitutional: Positive for fatigue.   Psychiatric/Behavioral: The patient is nervous/anxious.       A comprehensive 14 point review of systems was performed and was negative except as mentioned.    Medications:  The current medication list was reviewed in the EMR    ALLERGIES:    Allergies   Allergen Reactions   • Amoxicillin Nausea And Vomiting   • Ciprofloxacin    • Sulfa Antibiotics        Objective      Vitals:    18 1203   BP: 150/90   Pulse: 75   Resp: 14   Temp: 98.8 °F (37.1 °C)   SpO2: 99%   Weight: 74.5 kg (164 lb 3.2 oz)   Height: 161 cm (63.39\")   PainSc: 0-No pain  Comment: body aches     Current Status 2018   ECOG score 0       Physical Exam    GENERAL:  Well-developed, well-nourished in no acute distress.   SKIN:  Warm, dry without rashes, purpura or petechiae.  HEAD:  Normocephalic.  EYES:  Pupils equal, round and reactive to light.  EOMs intact.  Conjunctivae normal.  EARS:  Hearing intact.  NOSE:  Septum midline.  No excoriations or nasal discharge.  MOUTH:  Tongue is well-papillated; no " stomatitis or ulcers.  Lips normal.  THROAT:  Oropharynx without lesions or exudates.  NECK:  Supple with good range of motion; no thyromegaly or masses, no JVD.  LYMPHATICS:  No cervical, supraclavicular, axillary or inguinal adenopathy.  CHEST:  Lungs clear to percussion and auscultation. Good airflow.  BREASTS: Bilateral chest wall is benign with no axillary adenopathy  CARDIAC:  Regular rate and rhythm without murmurs, rubs or gallops. Normal S1,S2.  ABDOMEN:  Soft, nontender with no organomegaly or masses.  EXTREMITIES:  No clubbing, cyanosis or edema.  NEUROLOGICAL:  Cranial Nerves II-XII grossly intact.  No focal neurological deficits.  PSYCHIATRIC:  Normal affect and mood.        RECENT LABS:  Hematology WBC   Date Value Ref Range Status   07/05/2018 6.08 4.00 - 10.00 10*3/mm3 Final     RBC   Date Value Ref Range Status   07/05/2018 4.28 3.90 - 5.00 10*6/mm3 Final     Hemoglobin   Date Value Ref Range Status   07/05/2018 12.2 11.5 - 14.9 g/dL Final     Hematocrit   Date Value Ref Range Status   07/05/2018 36.1 34.0 - 45.0 % Final     Platelets   Date Value Ref Range Status   07/05/2018 228 150 - 375 10*3/mm3 Final   2D echo and stress echo normal in 5 /18       BONE SURVEY  This report was finalized on 6/25/2018 A standard bone survey was obtained. No discrete lytic or blastic  lesions are identified. There are moderately extensive degenerative disc  changes at the C4-C5 and C5-C6 and C6-C7 levels and at multiple levels  throughout the thoracic spine. No fractures are identified.     IMPRESSIONS: Unremarkable bone survey except for degenerative disc  changes as noted.     This report was finalized on 6/25/2018    Assessment/Plan   1.  T2 N1 ER/GA negative HER-2 positive left breast cancer 2013 treated with Adriamycin Cytoxan Taxol Herceptin and bilateral mastectomies plus radiation 4.5 Years from diagnosis  2.  BRCA negative  3.  Hemangioma of the liver  4.  family history of malignancy  5.  Mild chronic  anemia  6.  Anxiety intolerant of Effexor ?  Zyprexa 2.5 mg   7. Recurrent syncope without obvious precipitating cause workup underway  8.  Incidental finding of a monoclonal gammopathy IgG kappa workup ongoing    Plan   1.Bone marrow biopsy and staining for amyloid  2.  Return in 6-8 weeks for follow-up and if her bone marrow is negative and a cardiac workup is negative she may want a second opinion at [facility] which we will facilitate            .          7/5/2018      CC:

## 2018-08-07 VITALS
HEIGHT: 63 IN | OXYGEN SATURATION: 96 % | HEART RATE: 72 BPM | SYSTOLIC BLOOD PRESSURE: 122 MMHG | RESPIRATION RATE: 15 BRPM | HEART RATE: 76 BPM | DIASTOLIC BLOOD PRESSURE: 85 MMHG | DIASTOLIC BLOOD PRESSURE: 78 MMHG | SYSTOLIC BLOOD PRESSURE: 107 MMHG | OXYGEN SATURATION: 100 % | RESPIRATION RATE: 24 BRPM | DIASTOLIC BLOOD PRESSURE: 75 MMHG | SYSTOLIC BLOOD PRESSURE: 134 MMHG | RESPIRATION RATE: 21 BRPM | HEART RATE: 85 BPM | TEMPERATURE: 96.5 F | DIASTOLIC BLOOD PRESSURE: 71 MMHG | RESPIRATION RATE: 19 BRPM | DIASTOLIC BLOOD PRESSURE: 83 MMHG | SYSTOLIC BLOOD PRESSURE: 132 MMHG | RESPIRATION RATE: 16 BRPM | DIASTOLIC BLOOD PRESSURE: 54 MMHG | SYSTOLIC BLOOD PRESSURE: 130 MMHG | SYSTOLIC BLOOD PRESSURE: 143 MMHG | DIASTOLIC BLOOD PRESSURE: 64 MMHG | HEART RATE: 78 BPM | DIASTOLIC BLOOD PRESSURE: 77 MMHG | HEART RATE: 82 BPM | SYSTOLIC BLOOD PRESSURE: 146 MMHG | RESPIRATION RATE: 20 BRPM | DIASTOLIC BLOOD PRESSURE: 95 MMHG | HEART RATE: 75 BPM | HEART RATE: 54 BPM | HEART RATE: 74 BPM | DIASTOLIC BLOOD PRESSURE: 72 MMHG | SYSTOLIC BLOOD PRESSURE: 149 MMHG | TEMPERATURE: 96.6 F | HEART RATE: 80 BPM | SYSTOLIC BLOOD PRESSURE: 141 MMHG | OXYGEN SATURATION: 98 % | WEIGHT: 159 LBS | HEART RATE: 58 BPM

## 2018-08-10 ENCOUNTER — OFFICE (AMBULATORY)
Dept: URBAN - METROPOLITAN AREA PATHOLOGY 4 | Facility: PATHOLOGY | Age: 53
End: 2018-08-10
Payer: COMMERCIAL

## 2018-08-10 ENCOUNTER — AMBULATORY SURGICAL CENTER (AMBULATORY)
Dept: URBAN - METROPOLITAN AREA SURGERY 17 | Facility: SURGERY | Age: 53
End: 2018-08-10
Payer: COMMERCIAL

## 2018-08-10 DIAGNOSIS — D12.2 BENIGN NEOPLASM OF ASCENDING COLON: ICD-10-CM

## 2018-08-10 DIAGNOSIS — Z80.0 FAMILY HISTORY OF MALIGNANT NEOPLASM OF DIGESTIVE ORGANS: ICD-10-CM

## 2018-08-10 DIAGNOSIS — K29.70 GASTRITIS, UNSPECIFIED, WITHOUT BLEEDING: ICD-10-CM

## 2018-08-10 DIAGNOSIS — R19.7 DIARRHEA, UNSPECIFIED: ICD-10-CM

## 2018-08-10 DIAGNOSIS — R14.2 ERUCTATION: ICD-10-CM

## 2018-08-10 DIAGNOSIS — R19.4 CHANGE IN BOWEL HABIT: ICD-10-CM

## 2018-08-10 DIAGNOSIS — R19.5 OTHER FECAL ABNORMALITIES: ICD-10-CM

## 2018-08-10 LAB
GI HISTOLOGY: A. SELECT: (no result)
GI HISTOLOGY: B. SELECT: (no result)
GI HISTOLOGY: C. UNSPECIFIED: (no result)
GI HISTOLOGY: D. SELECT: (no result)
GI HISTOLOGY: E. UNSPECIFIED: (no result)
GI HISTOLOGY: PDF REPORT: (no result)

## 2018-08-10 PROCEDURE — 43239 EGD BIOPSY SINGLE/MULTIPLE: CPT | Performed by: INTERNAL MEDICINE

## 2018-08-10 PROCEDURE — 88305 TISSUE EXAM BY PATHOLOGIST: CPT | Performed by: INTERNAL MEDICINE

## 2018-08-10 PROCEDURE — 45380 COLONOSCOPY AND BIOPSY: CPT | Performed by: INTERNAL MEDICINE

## 2018-08-13 ENCOUNTER — HOSPITAL ENCOUNTER (OUTPATIENT)
Dept: CT IMAGING | Facility: HOSPITAL | Age: 53
Discharge: HOME | End: 2018-08-13
Attending: INTERNAL MEDICINE | Admitting: INTERNAL MEDICINE

## 2018-08-13 VITALS
OXYGEN SATURATION: 99 % | TEMPERATURE: 97.2 F | SYSTOLIC BLOOD PRESSURE: 115 MMHG | BODY MASS INDEX: 27.46 KG/M2 | HEART RATE: 67 BPM | DIASTOLIC BLOOD PRESSURE: 65 MMHG | HEIGHT: 63 IN | RESPIRATION RATE: 16 BRPM | WEIGHT: 155 LBS

## 2018-08-13 DIAGNOSIS — D47.2 MGUS (MONOCLONAL GAMMOPATHY OF UNKNOWN SIGNIFICANCE): ICD-10-CM

## 2018-08-13 PROCEDURE — 88311 DECALCIFY TISSUE: CPT | Performed by: INTERNAL MEDICINE

## 2018-08-13 PROCEDURE — 77012 CT SCAN FOR NEEDLE BIOPSY: CPT

## 2018-08-13 PROCEDURE — 88305 TISSUE EXAM BY PATHOLOGIST: CPT | Performed by: INTERNAL MEDICINE

## 2018-08-13 RX ORDER — LIDOCAINE HYDROCHLORIDE 10 MG/ML
20 INJECTION, SOLUTION INFILTRATION; PERINEURAL ONCE
Status: COMPLETED | OUTPATIENT
Start: 2018-08-13 | End: 2018-08-13

## 2018-08-13 RX ORDER — ALPRAZOLAM 1 MG/1
1 TABLET ORAL ONCE
Status: COMPLETED | OUTPATIENT
Start: 2018-08-13 | End: 2018-08-13

## 2018-08-13 RX ORDER — LEVOTHYROXINE SODIUM 50 UG/1
100 CAPSULE ORAL 2 TIMES WEEKLY
COMMUNITY
End: 2018-09-07 | Stop reason: SDUPTHER

## 2018-08-13 RX ADMIN — LIDOCAINE HYDROCHLORIDE 20 ML: 10 INJECTION, SOLUTION INFILTRATION; PERINEURAL at 10:55

## 2018-08-13 RX ADMIN — ALPRAZOLAM 1 MG: 1 TABLET ORAL at 09:35

## 2018-08-13 NOTE — DISCHARGE INSTRUCTIONS
EDUCATION /DISCHARGE INSTRUCTIONS  CT/US guided biopsy:  A biopsy is a procedure done to remove tissue for further analysis.  Before images are taken to locate the target area.  Images can be obtained using ultrasound, CT or MRI.  A physician will clean your skin with antiseptic soap, place a sterile towel around the site and administer a local anesthetic to numb the area.  The physician will then insert a special needle.  Sometimes images are taken of the needle after it is inserted to ensure the needle is in the correct area to be biopsied.   A sample is obtained and sent to the laboratory for study.  Occasionally the laboratory is unable to make a diagnosis from the sample and the procedure may need to be repeated.  Within a week the radiologist will send a report to your physician.  A pathologist will also examine the tissue and send a report.      Risks of the procedure include but are not limited to:   *  Bleeding    *  Infection   *  Puncture of surrounding organs *  Death     *  Lung collapse if the biopsy is near the chest which may require insertion of a       tube to re-inflate the lung if severe.      Benefits of the procedure:  Using x-ray helps to locate the area that requires a biopsy. The procedure is less invasive than a surgical procedure, there are no large incisions and it does not require anesthesia.      Alternatives to the procedure:  A biopsy can be performed surgically.  Risks of a surgical biopsy include exposure to anesthesia, infection, excessive bleeding and injury to abdominal organs.  A benefit of surgical biopsy is the ability to see the area to be biopsied and remove of a larger piece of tissue.    THIS EDUCATION INFORMATION WAS REVIEWED PRIOR TO PROCEDURE AND CONSENT. Patient initials__________________Time___________________    Post Procedure:    *  Expect the biopsy site may be tender up to one week.    *  Rest today (no pushing pulling or straining).   *  Slowly increase activity  tomorrow.    *  If you received sedation do not drive for 24 hours.   *  Keep dressing clean and dry.   *  Leave dressing on puncture site for 24 hours.    *  You may shower when dressing removed.    Call your doctor if experiencing:   *  Signs of infection such as redness, swelling, excessive pain and / or foul        smelling drainage from the puncture site.   *  Chills or fever over 101 degrees (by mouth).   *  Unrelieved pain.   *  Any new or severe symptoms.   *  If experiencing sudden / severe shortness of breath or chest pain go to the       nearest emergency room.     Following the procedure:     Follow-up with the ordering physician as directed.    Continue to take other medications as directed by your physician unless    otherwise instructed.   If applicable, resume taking your blood thinners or Aspirin on ___8/14/18________.       If you have any concerns please call the Radiology Nurses Desk at [phone].  You are the most important factor in your recovery.  Follow the above instructions carefully.

## 2018-08-13 NOTE — H&P
Name: Santos Witt ADMIT: 2018   : 1965  PCP: Sheridan Richardson MD    MRN: [id number] LOS: 0 days   AGE/SEX: 53 y.o. female  ROOM: Room/bed info not found       Chief complaint   Patient is a 53 y.o. female presentsfor bone marrow biopsy  Past Surgical History:  Past Surgical History:   Procedure Laterality Date   • BREAST BIOPSY Left 08/2013    x2    • BREAST CYST EXCISION Left     Fibroid cyst left nipple   • BREAST SURGERY Bilateral 2013    Mastectomy   • HYSTERECTOMY  2011    Partial   • KNEE ARTHROSCOPY Left    • PELVIC LAPAROSCOPY      cysts removed       Past Medical History:  Past Medical History:   Diagnosis Date   • Acquired hypothyroidism    • Anemia     History of anemia   • Arthritis     Knees   • Asthma    • Cancer (CMS/HCC) 2013    Left breast cancer, T2NM0   • H/O Chondromalacia patellae, left    • H/O Regurgitation, mitral/triscupid, mild    • H/O Syncope w/collapse    • Heart murmur     Childhood   • History of chronic fatigue        Home Medications:    (Not in a hospital admission)    Allergies:  Ciprofloxacin; Sulfa antibiotics; and Amoxicillin    Family History:  Family History   Problem Relation Age of Onset   • Kidney cancer Mother         Treated surgically   • Heart disease Mother    • Hypertension Mother    • Thyroid disease Mother    • Hypertension Father    • Thyroid disease Sister    • No Known Problems Brother    • Breast cancer Paternal Aunt    • Kidney cancer Paternal Uncle    • Colon cancer Paternal Grandfather         In his 60s   • Breast cancer Paternal Aunt    • Diabetes Maternal Aunt        Social History:  Social History   Substance Use Topics   • Smoking status: Never Smoker   • Smokeless tobacco: Never Used   • Alcohol use Yes      Comment: Very little        Objective     Physical Exam:   Pelvis ok for biopsy    Vital Signs  Temp:  [97.2 °F (36.2 °C)] 97.2 °F (36.2 °C)  Heart Rate:  [75] 75  Resp:  [16] 16  BP: (117)/(72)  117/72    Anticipated Surgical Procedure:  Bone marrow biopsy    The risks, benefits and alternatives of this procedure have been discussed with the patient or responsible party: Yes        Agustin Verduzco MD  08/13/18  10:39 AM

## 2018-08-14 ENCOUNTER — TELEPHONE (OUTPATIENT)
Dept: INTERVENTIONAL RADIOLOGY/VASCULAR | Facility: HOSPITAL | Age: 53
End: 2018-08-14

## 2018-08-15 LAB — REF LAB TEST METHOD: NORMAL

## 2018-09-06 LAB
CYTO UR: NORMAL
LAB AP CASE REPORT: NORMAL
LAB AP CLINICAL INFORMATION: NORMAL
Lab: NORMAL
PATH REPORT.ADDENDUM SPEC: NORMAL
PATH REPORT.FINAL DX SPEC: NORMAL
PATH REPORT.GROSS SPEC: NORMAL

## 2018-09-07 ENCOUNTER — OFFICE VISIT (OUTPATIENT)
Dept: ONCOLOGY | Facility: CLINIC | Age: 53
End: 2018-09-07

## 2018-09-07 ENCOUNTER — APPOINTMENT (OUTPATIENT)
Dept: LAB | Facility: HOSPITAL | Age: 53
End: 2018-09-07

## 2018-09-07 VITALS
OXYGEN SATURATION: 99 % | BODY MASS INDEX: 29.02 KG/M2 | WEIGHT: 163.8 LBS | DIASTOLIC BLOOD PRESSURE: 86 MMHG | TEMPERATURE: 98.4 F | HEIGHT: 63 IN | SYSTOLIC BLOOD PRESSURE: 130 MMHG | HEART RATE: 84 BPM | RESPIRATION RATE: 16 BRPM

## 2018-09-07 DIAGNOSIS — C50.411 MALIGNANT NEOPLASM OF UPPER-OUTER QUADRANT OF BOTH BREASTS IN FEMALE, ESTROGEN RECEPTOR POSITIVE (HCC): ICD-10-CM

## 2018-09-07 DIAGNOSIS — C50.411 MALIGNANT NEOPLASM OF UPPER-OUTER QUADRANT OF BOTH BREASTS IN FEMALE, ESTROGEN RECEPTOR POSITIVE (HCC): Primary | ICD-10-CM

## 2018-09-07 DIAGNOSIS — D47.2 MGUS (MONOCLONAL GAMMOPATHY OF UNKNOWN SIGNIFICANCE): Primary | ICD-10-CM

## 2018-09-07 DIAGNOSIS — Z17.0 MALIGNANT NEOPLASM OF UPPER-OUTER QUADRANT OF BOTH BREASTS IN FEMALE, ESTROGEN RECEPTOR POSITIVE (HCC): Primary | ICD-10-CM

## 2018-09-07 DIAGNOSIS — C50.412 MALIGNANT NEOPLASM OF UPPER-OUTER QUADRANT OF BOTH BREASTS IN FEMALE, ESTROGEN RECEPTOR POSITIVE (HCC): Primary | ICD-10-CM

## 2018-09-07 DIAGNOSIS — C50.412 MALIGNANT NEOPLASM OF UPPER-OUTER QUADRANT OF BOTH BREASTS IN FEMALE, ESTROGEN RECEPTOR POSITIVE (HCC): ICD-10-CM

## 2018-09-07 DIAGNOSIS — Z17.0 MALIGNANT NEOPLASM OF UPPER-OUTER QUADRANT OF BOTH BREASTS IN FEMALE, ESTROGEN RECEPTOR POSITIVE (HCC): ICD-10-CM

## 2018-09-07 LAB
BASOPHILS # BLD AUTO: 0.02 10*3/MM3 (ref 0–0.1)
BASOPHILS NFR BLD AUTO: 0.3 % (ref 0–1.1)
DEPRECATED RDW RBC AUTO: 43.3 FL (ref 37–49)
EOSINOPHIL # BLD AUTO: 0.13 10*3/MM3 (ref 0–0.36)
EOSINOPHIL NFR BLD AUTO: 1.7 % (ref 1–5)
ERYTHROCYTE [DISTWIDTH] IN BLOOD BY AUTOMATED COUNT: 13.8 % (ref 11.7–14.5)
HCT VFR BLD AUTO: 35.2 % (ref 34–45)
HGB BLD-MCNC: 11.5 G/DL (ref 11.5–14.9)
IMM GRANULOCYTES # BLD: 0.02 10*3/MM3 (ref 0–0.03)
IMM GRANULOCYTES NFR BLD: 0.3 % (ref 0–0.5)
LYMPHOCYTES # BLD AUTO: 2.42 10*3/MM3 (ref 1–3.5)
LYMPHOCYTES NFR BLD AUTO: 31.1 % (ref 20–49)
MCH RBC QN AUTO: 28 PG (ref 27–33)
MCHC RBC AUTO-ENTMCNC: 32.7 G/DL (ref 32–35)
MCV RBC AUTO: 85.6 FL (ref 83–97)
MONOCYTES # BLD AUTO: 1 10*3/MM3 (ref 0.25–0.8)
MONOCYTES NFR BLD AUTO: 12.9 % (ref 4–12)
NEUTROPHILS # BLD AUTO: 4.18 10*3/MM3 (ref 1.5–7)
NEUTROPHILS NFR BLD AUTO: 53.7 % (ref 39–75)
NRBC BLD MANUAL-RTO: 0 /100 WBC (ref 0–0)
PLATELET # BLD AUTO: 224 10*3/MM3 (ref 150–375)
PMV BLD AUTO: 9.4 FL (ref 8.9–12.1)
RBC # BLD AUTO: 4.11 10*6/MM3 (ref 3.9–5)
WBC NRBC COR # BLD: 7.77 10*3/MM3 (ref 4–10)

## 2018-09-07 PROCEDURE — 36415 COLL VENOUS BLD VENIPUNCTURE: CPT | Performed by: INTERNAL MEDICINE

## 2018-09-07 PROCEDURE — 85025 COMPLETE CBC W/AUTO DIFF WBC: CPT | Performed by: INTERNAL MEDICINE

## 2018-09-07 PROCEDURE — 99214 OFFICE O/P EST MOD 30 MIN: CPT | Performed by: INTERNAL MEDICINE

## 2018-09-07 RX ORDER — LEVOTHYROXINE SODIUM 0.07 MG/1
75 TABLET ORAL
COMMUNITY
Start: 2018-08-17

## 2018-09-07 NOTE — PROGRESS NOTES
Subjective      REASONS FOR FOLLOWUP:   1. T2N1M0, ER/NH negative, HER2 positive breast cancer.   2. BRCA 1 and 2 negative.   3. Adriamycin/Cytoxan followed by Taxol and Herceptin, started on 11/08/2013.   4. Syncopal episode after cycle #3 with stable echo, chemotherapy continued.   5. FUO after cycle 4 of chemotherapy associated with severe mouth pain, marked elevation of liver function tests and ferritin to 44,000, stable echocardiogram, stable cardiac function, ?etiology, and query drug reaction versus viral infection.   6. Taxol weekly initiated on 02/13/2014. Close followup of LFTs and cardiac function.   7. Echocardiogram 03/05/2014 stable at 63%. Treatment continued with Taxol and Herceptin.   8. Neuropathy grade 2. Taxol decreased 15% on 03/20/2014.   9. Q.3 week Herceptin started 05/23/2014 with plans for radiation consult.   10. Itching with Herceptin. Hydrocortisone and Pepcid added to care plan.   11. Patient seen 07/25/2014 with mild reduction in ejection fraction noted. Fo llowup echocardiogram scheduled prior to next visit month, Herceptin continued with hydrocortisone and Pepcid pre-medications.   12. Further drop in the ejection fraction to 53%. Because of some dizziness and near syncope, treatment with Herceptin held with planned followup in 1 month with repeat echocardiogram. MRI done for headaches was negative.   13. EF up to 64%. Herceptin resumed. Previous echocardiogram felt to be suboptimal due to lack of contrast.   14. Syncopal episode 12/19/2014 with a negative cardiac evaluation. Neurology consult recommended.   15. MRI done for headaches. Negative in August 2014.   16. CT of the abdomen done at [facility] in 10/2014 shows a liver lesion, which was indeterminate. They recommended MRI. We have compared to a previous MRI in 2014 and established this is hemangioma.                     17.  Recurrent syncope workup found incidental IgG kappa monoclonal gammopathy                                           Workup ongoing for this in 6/18    History of Present Illness patient is a 52-year-old lady with a node positive HER-2 positive ER negative breast cancer 4.5 years from completion of adjuvant chemotherapy.  She comes in today for follow-up and overall she is doing well but is having intermittent episodes of syncope which sounds vasovagal the last one was in May when she was at a bar drinking with friends .    she's had an echo and a stress test and an EEG andbrain MRI  which was reportedly benign.  She is currently wearing a event monitor During the workup of these syncopal episodes the neurologist ordered an immunoelectrophoresis which showed an IgG paraprotein of 0.8 g/dL.  Need to evaluate this further but I don't think this is anything to do with her syncope unless she has an autonomic neuropathy but she was sitting down at the time she felt nausea  and had her syncopal episode that does not appear to be orthostatic    I suggested she stop all her numerous supplements including turmeric , passion floor selenium lecithin glucosamine JGermanium Primrose oil coconut oil black currant seed oil amino acids and see how she does     We reviewed her skeletal survey which is essentially negative her urine did not show any significant Bence Witt protein she has a 0.9 g/dL M protein with a mildly elevated light chain ratio and a mildly elevated beta-2 microglobulin of 2.5      I recommended a bone marrow biopsy and staining for amyloid and this was done and her bone marrow showed 5% plasma cells which are monotypic and have a 13 every minus but no amyloid deposition-I explained that she has an MGUS but can proceed to myeloma especially because she has an abnormal free light chain ratio and we will watch her M protein closely  I'm not sure this explains her syncope    Active Ambulatory Problems     Diagnosis Date Noted   • Bilateral malignant neoplasm of upper outer quadrant of breast  in female (CMS/[facility]) 04/15/2016   • Chronic pain of left knee 2018   • Acute pain of left knee 2018   • Contusion of left knee 2018   • Arthritis of left knee 2018   • Chondromalacia, patella, left 2018   • MGUS (monoclonal gammopathy of unknown significance) 2018     Resolved Ambulatory Problems     Diagnosis Date Noted   • No Resolved Ambulatory Problems     Past Medical History:   Diagnosis Date   • Acquired hypothyroidism    • Anemia    • Arthritis    • Asthma    • Cancer (CMS/[facility])    • H/O Chondromalacia patellae, left    • H/O Regurgitation, mitral/triscupid, mild    • H/O Syncope w/collapse    • Heart murmur    • History of chronic fatigue      SURGICAL HISTORY: Fibroadenoma from the left breast in . Uterine fibroids removed laparoscopically in . Left knee arthroscopy in  and hysterectomy in . Ovaries were left in place.     GYN HISTORY: Menarche age 13.  0. She obviously stopped menstruating at the time of her hysterectomy in  and is having hot flashes.     HEMATOLOGIC/ONCOLOGIC HISTORY:    The  patient initially seen on 10/18/2013, a 48-year-old  female who works as a  at the [place] who has a long history of fibrocystic breast disease with multiple ultrasounds and mammograms in the past to evaluate c ysts but after her most recent mammogram, because of some abnormalities, an ultrasound was recommended on 2013 at [facility]. The targeted ultrasound showed 3 circumscribed solid masses in the left breast at the 2 o' clock position, axillar y tail of the breast. The appearance was suggestive of intramammary lymph nodes without a definite fatty hilum which was worrisome for tumor involvement. One of the 3 masses had actually increased in size compared to 2 of the other lesions which were un changed from 2010 suggesting benign etiology. Because of the greater than 20% interval increase  in size of one of these nodules since the last mammogram, ultrasound guided biopsy was recommended.   Ultrasound-guided biopsy was done on 08/01/2013 and this biopsy showed findings highly suspicious for lymph node involvement by ductal carcinoma and the patient then went to see Dr. Witt who sent her for another biopsy on 08/19/2013 and a 1.5 cm mass was seen at the 3 o' clock position of the left breast in luan tion to the 3 abnormal axillary masses which were felt to be lymph nodes. This area was biopsied on 08/19/2013 and the path report showed invasive ductal carcinoma and DCIS grade 3 with the largest focus of invasive cancer being 1 cm and DCIS was interme d iate to high grade. The left axillary lymph node was core biopsied and confirmed metastatic carcinoma. ER/NV were negative, HER2 was 3+ positive. This led to an MRI of the breast 08/27/2013 which showed 3 cm mass in the lower outer quadrant of the left breast and additional clump enhancement in the middle and anterior third of the left breast at the 3 o' clock axis suspicious for in situ carcinoma and 3 rounded masses measured up to 9 mm in greatest dimension are noted consistent with lymph nodes. One ha d a clip from the recent biopsy. Right breast showed stippled foci of variable enhancement with no dominant or clumped findings, prominent right axillary lymph nodes were noted and right axillary lymph node biopsy was done. This was nondiagnostic.   The patient underwent bone scan on 09/09/2013 which was unremarkable except for some increased activity at T9 and the right part of L3 which was again felt to be nonspecific but plain films were recommended. CT scan of the chest, abdomen and pelvis was done which was also unremarkable except for prominent intramammary nodes in the upper outer left breast and several prominent axillary lymph nodes bilaterally one of which has been biopsied on the right. Appearance raised the concern of possible metastatic to  both axillae but there were no other sites of involvement. Bone density dated 09/20/2013 was normal. Patient then went for surgery on 09/25/2013 at which time she underwent right mastectomy which was unremarkable and left total mastectomy with axillary dissection which showed a 2.9 cm grade 3 infiltrating ductal carcinoma with associated DCIS, margins were uninvolved. Three of 21 lymph nodes showed metastatic disease making this a pT2N1.   The patient has done well from surgery but has had a lot of prob lems with anxiety and hot flashes but otherwise has done well. She went to see Dr. Cedeno for recommendations and then came to see us for further discussion of treatment options. In addition, the patient had BRCA testing which thankfully was negative.   T he patient had a MUGA scan, which showed an ejection fraction of 50% and no other abnormalities. Her plain films of the abnormal spots on her bone scan were negative. She had port placed and is ready to start treatment. 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   When she had CT scans of the chest, abdomen and pelvis, they were unremarkable except for hemangioma in the liver and prominent intramammary nodes in the upper outer quadrant of the left breast with several prominent axillary nodes bilaterally.   The patient started dose-dense AC with fairly good tolerance except for some mucositis after cycle 4. She had a febrile illness that required hospitalization from 01/02/2014 - 01/15/2014 with a fever of unknown origin. Multiple viral cultures were negative including CMV, EBV, herpes, respiratory panel and hepatiti s profile was negative. Mouth multiple blood and urine cultures and throat cultures were negative except for Haemophilus parainfluenza for which a course of  amoxicillin was given. During the hospitalization the patient spiked fevers continuously and then developed rapid elevation of her liver tests with a ferritin which went up as high as 44,000. The patient defervesced finally and the liver profile started coming down. Echo with bubble echo during her stay was normal with ejection fraction of 60%. The jose francisco shah was discharged home with plans to resume chemotherapy cautiously once her numbers stabilized.   The patient was seen on 01/22/2014 feeling much better. She still is not very active, feeling fatigued and deconditioned from lying in bed for almost 2 we eks. The decision was made because of borderline blood pressure of 94/70 and some mild tachycardia to have her be more active and exercise for another week and reevaluate to start Herceptin on 01/30/2014 with Taxol to be added after 2 doses of Herceptin i f she is tolerating the treatment well and her liver functions are normal. A bone marrow biopsy was also done during her hospital stay and no signs of hemophagocytosis or dysplasia although there was some aberrant myeloid antigen of uncertain significanc e which has been seen with myelodysplasia. She was also seen briefly by Dr. Fatmata Tolbert of Rheumatology while in the hospital for low positive ARTEMIO of 1:160 and anti-SSA antibody greater than 8 but Dr. Tolbert felt this was unlikely to be rheumatologica l disease because 4 doses of Cytoxan should not have precipitated this.   The patient initiated weekly Herceptin for 2 weeks with no significant problems. Taxol weekly initiated on 02/13/2014 with close followup of LFTs and cardiac function.   The patient was seen on 03/06/2014 with echo showing EF of 63%. We will continue Taxol and Herceptin weekly and watch her LFTs closely. Neurontin causes dizziness and she is not taking it. We will hold off on Effexor for the time being.   Patient required Neupogen for 3 doses because of an ANC of 1.06 with dose 4 and  Neupogen x3 is added for each dose.   The patient is seen 03/20/2014 doing fairly well. Taxol dose decreased to 15% because of neuropathy. Neupogen continued.   The patient was seen on 05/23/2014 doing well. Herceptin continued with plans to check an echocardiogram before her next treatment in 3 weeks.   MRI of the liver confirmed hemangioma. Echocardiogram is stable at 59%. Herceptin continued at 3-week intervals with the addition of Pepcid and Solu-Cortef 50 mg and the patient is almost certainly going to agree to radiation for her 3 node status, especially since the pathologist at [facility] thought there was extranodal extension.   The patient was seen 07/25/2014. Echocardiogram results noted, slightly reduced from previous, the patient proceeding through radiation therapy of left chest wall. Followup echocardiogram scheduled after her Herceptin therapy 07/25/2014.   Patient was seen on 08/14/2014 with EF of 53%. I discussed th e case with Dr. Bebe Fernandez and did not use the definitive contrast and this may explain the differences, but based on the fact that she had some near syncope on one occasion this weekend and the fact that she has had radiation to the left chest area with a significant skin burn, we will hold Herceptin today and bring her back in 4 weeks, but check her LFTs and ferritin again today.   The patient was 11/14/2014 doing well with ejection fraction of 63% and echocardiogram was reviewed. Herceptin will be continued until mid-January.   The patient is seen 12/29/2014. She had a syncopal episode 12/19/2014. ER workup was negative and neurological evaluation was ordered. Bone scan ordered for right hip pain. If this is negative she will have her port removed in J anuary and she is planning to delay reconstruction for a year.   The patient was evaluated for some pain in her back with a bone scan which was essentially unremarkable except for some degenerative changes at L3, and T6-7-8.  Because we were concerned, we ordered an MRI of the thoracic and lumbar spine which was benign with just DJD. No further followup is needed at this time with regard to scans.   Patient seen on 05/08/2015, doing well; occasional migraine-type headache persists. Port has been removed. She is thinking about reconstruction sometime at the end of the year.   CT of the abdomen done at [facility] in 10/2014 shows a liver lesion, which was indeterminate. They recommended MRI. We have compared to a previous MRI in 2014 and established this is hemangioma.   Patient seen on 12/18/2015 having had CT scans of the chest and abdo men at [facility] for some abdominal discomfort that showed a 3 cm lesion in the dome of the liver, which they were worried about and recommended an MRI, but we reviewed the scans and also compared to the previous MRI of the liver d one in 2014 in October and confirmed that this indeed was a hemangioma that has been stable since 2013. An echo was also done which showed an ejection fraction of 60% and stable.   7/17   patient is a 51-year-old  female with an ER/MN negative HER-2 positive breast cancer node positive T2 N1 M0 treated with Adriamycin and Cytoxan followed by Taxol Herceptin was completed in late 2014 .  He is here for routine follow-up and overall she is doing well she was diagnosed with asthma and  is on an inhaler with good improvement.  She has mild hot flashes but has mood swings and menopausal symptoms as reported into menopause with chemotherapy.  She has no unusual aches or pains but otherwise is doing well and working full-time.  She is not interested in reconstruction at this point.  We did discuss doing extended genetic testing because of her family history we referred her for the extended testing but she has not done that yet She did have a screening colonoscopy and this was negative.  DEXA scan was normal in April of  "this year.  She is still very anxious about recurrence which is understandable but getting better with time.    0.9 g IgG kappa MGUS noted the negative skeletal survey and urine protein.  Bone marrow planned workup for recurrent syncope underway per cardiology      SOCIAL HISTORY: Single. . She does not smoke. Drinks very little. No drug history.     FAMILY HISTORY: Both of her parents are in their 60s. Mother had kidney cancer and had a partial kidney resection. Brother  of head trauma, another is healthy. She has two sisters who are healthy. She has 2 paternal aunts with breast cancer; one is alive in her 70s, the other  at 48 of breast cancer. A paternal uncle with kidney cancer. Paternal grandfather with colon cancer in his 60s. Multiple paternal cousins with cancer inc luding leukemia and kidney cancer. No other history of breast or ovarian cancer in the family.     Review of Systems   Constitutional: Positive for fatigue.   Psychiatric/Behavioral: The patient is nervous/anxious.       A comprehensive 14 point review of systems was performed and was negative except as mentioned.    Medications:  The current medication list was reviewed in the EMR    ALLERGIES:    Allergies   Allergen Reactions   • Ciprofloxacin Hives   • Sulfa Antibiotics Hives   • Amoxicillin Nausea And Vomiting       Objective      Vitals:    18 1552   BP: 130/86   Pulse: 84   Resp: 16   Temp: 98.4 °F (36.9 °C)   SpO2: 99%   Weight: 74.3 kg (163 lb 12.8 oz)   Height: 161 cm (63.39\")   PainSc: 0-No pain     Current Status 2018   ECOG score 0       Physical Exam    GENERAL:  Well-developed, well-nourished in no acute distress.   SKIN:  Warm, dry without rashes, purpura or petechiae.  HEAD:  Normocephalic.  EYES:  Pupils equal, round and reactive to light.  EOMs intact.  Conjunctivae normal.  EARS:  Hearing intact.  NOSE:  Septum midline.  No excoriations or nasal discharge.  MOUTH:  Tongue is " well-papillated; no stomatitis or ulcers.  Lips normal.  THROAT:  Oropharynx without lesions or exudates.  NECK:  Supple with good range of motion; no thyromegaly or masses, no JVD.  LYMPHATICS:  No cervical, supraclavicular, axillary or inguinal adenopathy.  CHEST:  Lungs clear to percussion and auscultation. Good airflow.  BREASTS: Bilateral chest wall is benign with no axillary adenopathy  CARDIAC:  Regular rate and rhythm without murmurs, rubs or gallops. Normal S1,S2.  ABDOMEN:  Soft, nontender with no organomegaly or masses.  EXTREMITIES:  No clubbing, cyanosis or edema.  NEUROLOGICAL:  Cranial Nerves II-XII grossly intact.  No focal neurological deficits.  PSYCHIATRIC:  Normal affect and mood.        RECENT LABS:  Hematology WBC   Date Value Ref Range Status   09/07/2018 7.77 4.00 - 10.00 10*3/mm3 Final     RBC   Date Value Ref Range Status   09/07/2018 4.11 3.90 - 5.00 10*6/mm3 Final     Hemoglobin   Date Value Ref Range Status   09/07/2018 11.5 11.5 - 14.9 g/dL Final     Hematocrit   Date Value Ref Range Status   09/07/2018 35.2 34.0 - 45.0 % Final     Platelets   Date Value Ref Range Status   09/07/2018 224 150 - 375 10*3/mm3 Final   2D echo and stress echo normal in 5 /18       BONE SURVEY  This report was finalized on 6/25/2018 A standard bone survey was obtained. No discrete lytic or blastic  lesions are identified. There are moderately extensive degenerative disc  changes at the C4-C5 and C5-C6 and C6-C7 levels and at multiple levels  throughout the thoracic spine. No fractures are identified.     IMPRESSIONS: Unremarkable bone survey except for degenerative disc  changes as noted.     This report was finalized on 6/25/2018    Assessment/Plan   1.  T2 N1 ER/DC negative HER-2 positive left breast cancer 2013 treated with Adriamycin Cytoxan Taxol Herceptin and bilateral mastectomies plus radiation 4.5 Years from diagnosis  2.  BRCA negative  3.  Hemangioma of the liver  4.  family history of  malignancy  5.  Mild chronic anemia  6.  Anxiety intolerant of Effexor ?  Zyprexa 2.5 mg   7. Recurrent syncope without obvious precipitating cause workup underway  8.  Incidental finding of a monoclonal gammopathy IgG kappa skeletal survey negative bone marrow showing 5% plasma cells with no amyloid      Plan  1.return for myeloma parameters in 2 months and 5 months  2.  see me in 6 months   she has asked me for an anti-anxiety agent and I suggested she talk to her family doctor as I will be seeing her very infrequently           .          9/7/2018      CC:

## 2018-11-02 ENCOUNTER — LAB (OUTPATIENT)
Dept: LAB | Facility: HOSPITAL | Age: 53
End: 2018-11-02

## 2018-11-02 ENCOUNTER — CLINICAL SUPPORT (OUTPATIENT)
Dept: ONCOLOGY | Facility: HOSPITAL | Age: 53
End: 2018-11-02

## 2018-11-02 DIAGNOSIS — D47.2 MGUS (MONOCLONAL GAMMOPATHY OF UNKNOWN SIGNIFICANCE): ICD-10-CM

## 2018-11-02 LAB
BASOPHILS # BLD AUTO: 0.02 10*3/MM3 (ref 0–0.1)
BASOPHILS NFR BLD AUTO: 0.4 % (ref 0–1.1)
DEPRECATED RDW RBC AUTO: 42.5 FL (ref 37–49)
EOSINOPHIL # BLD AUTO: 0.07 10*3/MM3 (ref 0–0.36)
EOSINOPHIL NFR BLD AUTO: 1.5 % (ref 1–5)
ERYTHROCYTE [DISTWIDTH] IN BLOOD BY AUTOMATED COUNT: 13.6 % (ref 11.7–14.5)
HCT VFR BLD AUTO: 35.6 % (ref 34–45)
HGB BLD-MCNC: 11.6 G/DL (ref 11.5–14.9)
IMM GRANULOCYTES # BLD: 0 10*3/MM3 (ref 0–0.03)
IMM GRANULOCYTES NFR BLD: 0 % (ref 0–0.5)
LYMPHOCYTES # BLD AUTO: 1.61 10*3/MM3 (ref 1–3.5)
LYMPHOCYTES NFR BLD AUTO: 35.3 % (ref 20–49)
MCH RBC QN AUTO: 28 PG (ref 27–33)
MCHC RBC AUTO-ENTMCNC: 32.6 G/DL (ref 32–35)
MCV RBC AUTO: 86 FL (ref 83–97)
MONOCYTES # BLD AUTO: 0.5 10*3/MM3 (ref 0.25–0.8)
MONOCYTES NFR BLD AUTO: 11 % (ref 4–12)
NEUTROPHILS # BLD AUTO: 2.36 10*3/MM3 (ref 1.5–7)
NEUTROPHILS NFR BLD AUTO: 51.8 % (ref 39–75)
NRBC BLD MANUAL-RTO: 0 /100 WBC (ref 0–0)
PLATELET # BLD AUTO: 216 10*3/MM3 (ref 150–375)
PMV BLD AUTO: 9.3 FL (ref 8.9–12.1)
RBC # BLD AUTO: 4.14 10*6/MM3 (ref 3.9–5)
WBC NRBC COR # BLD: 4.56 10*3/MM3 (ref 4–10)

## 2018-11-02 PROCEDURE — 85025 COMPLETE CBC W/AUTO DIFF WBC: CPT | Performed by: INTERNAL MEDICINE

## 2018-11-02 PROCEDURE — 36415 COLL VENOUS BLD VENIPUNCTURE: CPT | Performed by: INTERNAL MEDICINE

## 2018-11-02 NOTE — PROGRESS NOTES
Pt presents for RN review. She states she still struggles with fatigue since TX. CBC reviewed and is WNL. Pt will call for results of JAYE,PE & Free Light chains. Copy of labs given and pt V/U.   Lab Results   Component Value Date    WBC 4.56 11/02/2018    HGB 11.6 11/02/2018    HCT 35.6 11/02/2018    MCV 86.0 11/02/2018     11/02/2018

## 2018-11-05 LAB
ALBUMIN SERPL-MCNC: 3.7 G/DL (ref 2.9–4.4)
ALBUMIN/GLOB SERPL: 1.1 {RATIO} (ref 0.7–1.7)
ALPHA1 GLOB FLD ELPH-MCNC: 0.2 G/DL (ref 0–0.4)
ALPHA2 GLOB SERPL ELPH-MCNC: 0.7 G/DL (ref 0.4–1)
B-GLOBULIN SERPL ELPH-MCNC: 1 G/DL (ref 0.7–1.3)
GAMMA GLOB SERPL ELPH-MCNC: 1.7 G/DL (ref 0.4–1.8)
GLOBULIN SER CALC-MCNC: 3.6 G/DL (ref 2.2–3.9)
IGA SERPL-MCNC: 149 MG/DL (ref 87–352)
IGG SERPL-MCNC: 1721 MG/DL (ref 700–1600)
IGM SERPL-MCNC: 49 MG/DL (ref 26–217)
INTERPRETATION SERPL IEP-IMP: ABNORMAL
KAPPA LC SERPL-MCNC: 47.6 MG/L (ref 3.3–19.4)
KAPPA LC/LAMBDA SER: 3.11 {RATIO} (ref 0.26–1.65)
LAMBDA LC FREE SERPL-MCNC: 15.3 MG/L (ref 5.7–26.3)
Lab: ABNORMAL
M-SPIKE: 0.7 G/DL
PROT SERPL-MCNC: 7.3 G/DL (ref 6–8.5)

## 2018-11-06 DIAGNOSIS — C50.412 MALIGNANT NEOPLASM OF UPPER-OUTER QUADRANT OF BOTH BREASTS IN FEMALE, ESTROGEN RECEPTOR POSITIVE (HCC): Primary | ICD-10-CM

## 2018-11-06 DIAGNOSIS — Z17.0 MALIGNANT NEOPLASM OF UPPER-OUTER QUADRANT OF BOTH BREASTS IN FEMALE, ESTROGEN RECEPTOR POSITIVE (HCC): Primary | ICD-10-CM

## 2018-11-06 DIAGNOSIS — C50.411 MALIGNANT NEOPLASM OF UPPER-OUTER QUADRANT OF BOTH BREASTS IN FEMALE, ESTROGEN RECEPTOR POSITIVE (HCC): Primary | ICD-10-CM

## 2018-11-06 NOTE — PROGRESS NOTES
REC'D REQUEST PER [facility] FOR ORDERS NEEDED FOR BREAST PROTHESIS, BRAS AND COMPRESSION ARM SLEEVES. ORDERS PLACED AND WAITING ON SIGNATURE BEFORE FAXING.

## 2018-11-14 DIAGNOSIS — C50.412 MALIGNANT NEOPLASM OF UPPER-OUTER QUADRANT OF BOTH BREASTS IN FEMALE, ESTROGEN RECEPTOR POSITIVE (HCC): Primary | ICD-10-CM

## 2018-11-14 DIAGNOSIS — Z17.0 MALIGNANT NEOPLASM OF UPPER-OUTER QUADRANT OF BOTH BREASTS IN FEMALE, ESTROGEN RECEPTOR POSITIVE (HCC): Primary | ICD-10-CM

## 2018-11-14 DIAGNOSIS — C50.411 MALIGNANT NEOPLASM OF UPPER-OUTER QUADRANT OF BOTH BREASTS IN FEMALE, ESTROGEN RECEPTOR POSITIVE (HCC): Primary | ICD-10-CM

## 2018-12-18 ENCOUNTER — CLINICAL SUPPORT (OUTPATIENT)
Dept: OTHER | Facility: HOSPITAL | Age: 53
End: 2018-12-18

## 2018-12-18 DIAGNOSIS — C50.411 MALIGNANT NEOPLASM OF UPPER-OUTER QUADRANT OF BOTH BREASTS IN FEMALE, ESTROGEN RECEPTOR POSITIVE (HCC): Primary | ICD-10-CM

## 2018-12-18 DIAGNOSIS — C50.412 MALIGNANT NEOPLASM OF UPPER-OUTER QUADRANT OF BOTH BREASTS IN FEMALE, ESTROGEN RECEPTOR POSITIVE (HCC): Primary | ICD-10-CM

## 2018-12-18 DIAGNOSIS — Z17.0 MALIGNANT NEOPLASM OF UPPER-OUTER QUADRANT OF BOTH BREASTS IN FEMALE, ESTROGEN RECEPTOR POSITIVE (HCC): Primary | ICD-10-CM

## 2018-12-18 PROCEDURE — G0463 HOSPITAL OUTPT CLINIC VISIT: HCPCS

## 2019-01-16 ENCOUNTER — CONSULT (OUTPATIENT)
Dept: ORTHOPEDIC SURGERY | Facility: CLINIC | Age: 54
End: 2019-01-16

## 2019-01-16 VITALS — HEIGHT: 63 IN | BODY MASS INDEX: 29.41 KG/M2 | TEMPERATURE: 98.2 F | WEIGHT: 166 LBS

## 2019-01-16 DIAGNOSIS — M16.12 ARTHRITIS OF LEFT HIP: ICD-10-CM

## 2019-01-16 DIAGNOSIS — M25.552 LEFT HIP PAIN: Primary | ICD-10-CM

## 2019-01-16 PROCEDURE — 73502 X-RAY EXAM HIP UNI 2-3 VIEWS: CPT | Performed by: ORTHOPAEDIC SURGERY

## 2019-01-16 PROCEDURE — 99213 OFFICE O/P EST LOW 20 MIN: CPT | Performed by: ORTHOPAEDIC SURGERY

## 2019-01-16 RX ORDER — METHYLPREDNISOLONE 4 MG/1
TABLET ORAL
Qty: 21 TABLET | Refills: 0 | Status: SHIPPED | OUTPATIENT
Start: 2019-01-16 | End: 2019-08-23

## 2019-01-16 NOTE — PROGRESS NOTES
Patient Name: Santos Witt   YOB: 1965  Referring Primary Care Physician: Sheridan Richardson MD  BMI: Body mass index is 29.41 kg/m².    Chief Complaint:    Chief Complaint   Patient presents with   • Left Hip - Establish Care, Pain        Subjective:    HPI:   Santos Witt is a pleasant 53 y.o. year old who presents today for evaluation of   Chief Complaint   Patient presents with   • Left Hip - Establish Care, Pain    (Location). Duration: This has been going on for years. Timing: The pain is intermittent. and acute. Context or causative factors: unknown.  Relieving Factors:  In the past has tried OTC Tylenol  OTC meds/NSAIDS  activtiy modification  physical therapy  weight loss.     This problem is not new to this examiner.     Medications:   Home Medications:  Current Outpatient Medications on File Prior to Visit   Medication Sig   • albuterol (PROVENTIL) (2.5 MG/3ML) 0.083% nebulizer solution Inhale 2.5 mg As Needed.   • ALBUTEROL IN Inhale as needed.   • ascorbic acid (VITAMIN C) 500 MG tablet Take 1,000 mg by mouth Daily.   • B Complex Vitamins (VITAMIN B COMPLEX) capsule capsule Take  by mouth Daily.   • BLACK CURRANT SEED OIL PO Take  by mouth.   • Calcium-Magnesium-Vitamin D 400-166.7-133.3 MG-MG-UNIT tablet Take  by mouth.   • Cholecalciferol (VITAMIN D3) 5000 UNITS capsule capsule Take 5,000 Units by mouth 2 (Two) Times a Week.   • COCONUT OIL PO Take  by mouth.   • fluticasone (FLONASE) 50 MCG/ACT nasal spray 1 spray into the nostril(s) as directed by provider As Needed. Administer 2 sprays in each nostril for each dose.    • Fluticasone Furoate-Vilanterol (BREO ELLIPTA IN) Inhale As Needed.   • levothyroxine (SYNTHROID, LEVOTHROID) 75 MCG tablet Take 75 mcg by mouth.   • MAGNESIUM PO Take  by mouth Daily.   • Misc Natural Products (VALERIAN ROOT/PASSION FLOWER PO) Take  by mouth.   • montelukast (SINGULAIR) 10 MG tablet Take 10 mg by mouth every night.   • Multiple Vitamin  (MULTI-VITAMINS PO) Take  by mouth daily.   • NON FORMULARY Daily. 2% Iodine drops.   • saccharomyces boulardii (FLORASTOR) 250 MG capsule Take 250 mg by mouth.   • meloxicam (MOBIC) 15 MG tablet 1 PO Daily with food. (Patient taking differently: Take 15 mg by mouth As Needed. 1 PO Daily with food.)     No current facility-administered medications on file prior to visit.      Current Medications:  Scheduled Meds:  Continuous Infusions:  No current facility-administered medications for this visit.   PRN Meds:.    I have reviewed the patient's medical history in detail and updated the computerized patient record.  Review and summarization of old records includes:    Past Medical History:   Diagnosis Date   • Acquired hypothyroidism    • Anemia     History of anemia   • Arthritis     Knees   • Asthma    • Cancer (CMS/[facility]) 2013    Left breast cancer, T2NM0   • H/O Chondromalacia patellae, left    • H/O Regurgitation, mitral/triscupid, mild    • H/O Syncope w/collapse    • Heart murmur     Childhood   • History of chronic fatigue         Past Surgical History:   Procedure Laterality Date   • BREAST BIOPSY Left 08/2013    x2    • BREAST CYST EXCISION Left 1982    Fibroid cyst left nipple   • BREAST SURGERY Bilateral 09/2013    Mastectomy   • HYSTERECTOMY  06/2011    Partial   • KNEE ARTHROSCOPY Left 2006   • PELVIC LAPAROSCOPY  2002    cysts removed        Social History     Occupational History   • Occupation:      Comment: [employer]   Tobacco Use   • Smoking status: Never Smoker   • Smokeless tobacco: Never Used   Substance and Sexual Activity   • Alcohol use: Yes     Comment: Very little   • Drug use: No   • Sexual activity: Not on file    Social History     Social History Narrative   • Not on file        Family History   Problem Relation Age of Onset   • Kidney cancer Mother         Treated surgically   • Heart disease Mother    • Hypertension Mother    • Thyroid disease Mother    •  "Hypertension Father    • Thyroid disease Sister    • No Known Problems Brother    • Breast cancer Paternal Aunt    • Kidney cancer Paternal Uncle    • Colon cancer Paternal Grandfather         In his 60s   • Breast cancer Paternal Aunt    • Diabetes Maternal Aunt        ROS: 14 point review of systems was performed and all other systems were reviewed and are negative except for documented findings in HPI and today's encounter.     Allergies:   Allergies   Allergen Reactions   • Ciprofloxacin Hives   • Sulfa Antibiotics Hives   • Amoxicillin Nausea And Vomiting     Constitutional:  Denies fever, shaking or chills   Eyes:  Denies change in visual acuity   HENT:  Denies nasal congestion or sore throat   Respiratory:  Denies cough or shortness of breath   Cardiovascular:  Denies chest pain or severe LE edema   GI:  Denies abdominal pain, nausea, vomiting, bloody stools or diarrhea   Musculoskeletal:  Numbness, tingling, pain, or loss of motor function only as noted above in history of present illness.  : Denies painful urination or hematuria  Integument:  Denies rash, lesion or ulceration   Neurologic:  Denies headache or focal weakness  Endocrine:  Denies lymphadenopathy  Psych:  Denies confusion or change in mental status   Hem:  Denies active bleeding    Subjective     Objective:    Physical Exam: 53 y.o. female  Wt Readings from Last 3 Encounters:   01/16/19 75.3 kg (166 lb)   09/07/18 74.3 kg (163 lb 12.8 oz)   08/13/18 70.3 kg (155 lb)     Ht Readings from Last 3 Encounters:   01/16/19 160 cm (63\")   09/07/18 161 cm (63.39\")   08/13/18 160.2 cm (63.07\")     Body mass index is 29.41 kg/m².    Vitals:    01/16/19 1503   Temp: 98.2 °F (36.8 °C)       Vital signs reviewed.   General Appearance:    Alert, cooperative, in no acute distress                  Eyes: conjunctiva clear  ENT: external ears and nose atraumatic  CV: no peripheral edema  Resp: normal respiratory effort  Skin: no rashes or wounds; normal " turgor  Psych: mood and affect appropriate  Lymph: no nodes appreciated  Neuro: gross sensation intact  Vascular:  Palpable peripheral pulse in noted extremity  Musculoskeletal Extremities: HIP Exam: antalgic gait without assistive device left hip, Stinchfield postitive, pain with internal rotation, stiffness and guteal pain 2+ pedal pulses and brisk capillary refill Pedal edema none      Radiology:   Imaging done today and discussed at length with the patient:    Indication: pain related symptoms,  Views: 2V AP&LAT left hip(s)   Findings: severe end-stage arthritis (bone on bone, subchondral sclerosis/cysts, osteophytes)  Comparison views: viewed last xray done in the office.       Assessment:     ICD-10-CM ICD-9-CM   1. Left hip pain M25.552 719.45   2. Arthritis of left hip M16.12 716.95        Procedures       Plan: Biomechanics of pertinent body area discussed.  Risks, benefits, alternatives, comparisons, and complications of accepted medicines, injections, recommendations, surgical procedures, and therapies explained and education provided in laymen's terms. The patient was given the opportunity to ask questions and they were answerved to their satisfaction.   Natural history and expected course of this patient's diagnosis discussed along with evaluation of therapies. Questions answered.  Prescription medication given with instructions, warnings, and precautions.   PT referral offered and declined, advised on stretching/strengthening exercises for self at home.  Biomechanics and proper use of ambulatory aids:  crutches, walker, cane, &/or trekking poles.   Offered inj and or PT but with government shutdown no income.  Will try to do on own.  Medrol then restart nsaids.  Kyle prn      1/16/2019

## 2019-01-25 ENCOUNTER — CLINICAL SUPPORT (OUTPATIENT)
Dept: ONCOLOGY | Facility: HOSPITAL | Age: 54
End: 2019-01-25

## 2019-01-25 ENCOUNTER — LAB (OUTPATIENT)
Dept: LAB | Facility: HOSPITAL | Age: 54
End: 2019-01-25

## 2019-01-25 DIAGNOSIS — D47.2 MGUS (MONOCLONAL GAMMOPATHY OF UNKNOWN SIGNIFICANCE): ICD-10-CM

## 2019-01-25 LAB
ALBUMIN SERPL-MCNC: 4.1 G/DL (ref 3.5–5.2)
ALBUMIN/GLOB SERPL: 1.2 G/DL (ref 1.1–2.4)
ALP SERPL-CCNC: 66 U/L (ref 38–116)
ALT SERPL W P-5'-P-CCNC: 12 U/L (ref 0–33)
ANION GAP SERPL CALCULATED.3IONS-SCNC: 10.3 MMOL/L
AST SERPL-CCNC: 18 U/L (ref 0–32)
B2 MICROGLOB SERPL-MCNC: 2.5 MG/L (ref 0.8–2.2)
BASOPHILS # BLD AUTO: 0.02 10*3/MM3 (ref 0–0.1)
BASOPHILS NFR BLD AUTO: 0.4 % (ref 0–1.1)
BILIRUB SERPL-MCNC: 0.4 MG/DL (ref 0.1–1.2)
BUN BLD-MCNC: 19 MG/DL (ref 6–20)
BUN/CREAT SERPL: 22.9 (ref 7.3–30)
CALCIUM SPEC-SCNC: 9.1 MG/DL (ref 8.5–10.2)
CHLORIDE SERPL-SCNC: 103 MMOL/L (ref 98–107)
CO2 SERPL-SCNC: 26.7 MMOL/L (ref 22–29)
CREAT BLD-MCNC: 0.83 MG/DL (ref 0.6–1.1)
DEPRECATED RDW RBC AUTO: 43.1 FL (ref 37–49)
EOSINOPHIL # BLD AUTO: 0.09 10*3/MM3 (ref 0–0.36)
EOSINOPHIL NFR BLD AUTO: 1.9 % (ref 1–5)
ERYTHROCYTE [DISTWIDTH] IN BLOOD BY AUTOMATED COUNT: 13.8 % (ref 11.7–14.5)
GFR SERPL CREATININE-BSD FRML MDRD: 87 ML/MIN/1.73
GLOBULIN UR ELPH-MCNC: 3.3 GM/DL (ref 1.8–3.5)
GLUCOSE BLD-MCNC: 89 MG/DL (ref 74–124)
HCT VFR BLD AUTO: 34.5 % (ref 34–45)
HGB BLD-MCNC: 11.2 G/DL (ref 11.5–14.9)
IMM GRANULOCYTES # BLD AUTO: 0.01 10*3/MM3 (ref 0–0.03)
IMM GRANULOCYTES NFR BLD AUTO: 0.2 % (ref 0–0.5)
LYMPHOCYTES # BLD AUTO: 1.48 10*3/MM3 (ref 1–3.5)
LYMPHOCYTES NFR BLD AUTO: 31.4 % (ref 20–49)
MCH RBC QN AUTO: 27.9 PG (ref 27–33)
MCHC RBC AUTO-ENTMCNC: 32.5 G/DL (ref 32–35)
MCV RBC AUTO: 85.8 FL (ref 83–97)
MONOCYTES # BLD AUTO: 0.71 10*3/MM3 (ref 0.25–0.8)
MONOCYTES NFR BLD AUTO: 15.1 % (ref 4–12)
NEUTROPHILS # BLD AUTO: 2.4 10*3/MM3 (ref 1.5–7)
NEUTROPHILS NFR BLD AUTO: 51 % (ref 39–75)
NRBC BLD AUTO-RTO: 0 /100 WBC (ref 0–0)
PLATELET # BLD AUTO: 231 10*3/MM3 (ref 150–375)
PMV BLD AUTO: 8.9 FL (ref 8.9–12.1)
POTASSIUM BLD-SCNC: 4.5 MMOL/L (ref 3.5–4.7)
PROT SERPL-MCNC: 7.4 G/DL (ref 6.3–8)
RBC # BLD AUTO: 4.02 10*6/MM3 (ref 3.9–5)
SODIUM BLD-SCNC: 140 MMOL/L (ref 134–145)
WBC NRBC COR # BLD: 4.71 10*3/MM3 (ref 4–10)

## 2019-01-25 PROCEDURE — 80053 COMPREHEN METABOLIC PANEL: CPT | Performed by: INTERNAL MEDICINE

## 2019-01-25 PROCEDURE — 85025 COMPLETE CBC W/AUTO DIFF WBC: CPT | Performed by: INTERNAL MEDICINE

## 2019-01-25 PROCEDURE — 36415 COLL VENOUS BLD VENIPUNCTURE: CPT | Performed by: INTERNAL MEDICINE

## 2019-01-25 PROCEDURE — 82232 ASSAY OF BETA-2 PROTEIN: CPT | Performed by: INTERNAL MEDICINE

## 2019-01-25 NOTE — PROGRESS NOTES
Pt is here for lab with RN review.  CBC reviewed with pt, counts are stable for this pt at this time. Pt has no complaints. She is taking CBD oil for anxiety and hot flashes. Copy of labs given to pt and f/u appt reviewed. Pt is instructed to call with concerns prior to next visit. Pt will call for labs not yet resulted.  Lab Results   Component Value Date    WBC 4.71 01/25/2019    HGB 11.2 (L) 01/25/2019    HCT 34.5 01/25/2019    MCV 85.8 01/25/2019     01/25/2019

## 2019-01-28 LAB
ALBUMIN SERPL-MCNC: 3.6 G/DL (ref 2.9–4.4)
ALBUMIN/GLOB SERPL: 1.1 {RATIO} (ref 0.7–1.7)
ALPHA1 GLOB FLD ELPH-MCNC: 0.2 G/DL (ref 0–0.4)
ALPHA2 GLOB SERPL ELPH-MCNC: 0.7 G/DL (ref 0.4–1)
B-GLOBULIN SERPL ELPH-MCNC: 1 G/DL (ref 0.7–1.3)
GAMMA GLOB SERPL ELPH-MCNC: 1.6 G/DL (ref 0.4–1.8)
GLOBULIN SER CALC-MCNC: 3.6 G/DL (ref 2.2–3.9)
IGA SERPL-MCNC: 151 MG/DL (ref 87–352)
IGG SERPL-MCNC: 1594 MG/DL (ref 700–1600)
IGM SERPL-MCNC: 45 MG/DL (ref 26–217)
INTERPRETATION SERPL IEP-IMP: ABNORMAL
KAPPA LC SERPL-MCNC: 52.4 MG/L (ref 3.3–19.4)
KAPPA LC/LAMBDA SER: 5.35 {RATIO} (ref 0.26–1.65)
LAMBDA LC FREE SERPL-MCNC: 9.8 MG/L (ref 5.7–26.3)
Lab: ABNORMAL
M-SPIKE: 1 G/DL
PROT SERPL-MCNC: 7.2 G/DL (ref 6–8.5)

## 2019-02-06 ENCOUNTER — OFFICE (AMBULATORY)
Dept: URBAN - METROPOLITAN AREA CLINIC 75 | Facility: CLINIC | Age: 54
End: 2019-02-06

## 2019-02-06 VITALS
SYSTOLIC BLOOD PRESSURE: 122 MMHG | DIASTOLIC BLOOD PRESSURE: 70 MMHG | HEIGHT: 63 IN | WEIGHT: 160 LBS | HEART RATE: 78 BPM

## 2019-02-06 DIAGNOSIS — Z83.71 FAMILY HISTORY OF COLONIC POLYPS: ICD-10-CM

## 2019-02-06 DIAGNOSIS — Z86.010 PERSONAL HISTORY OF COLONIC POLYPS: ICD-10-CM

## 2019-02-06 DIAGNOSIS — R19.7 DIARRHEA, UNSPECIFIED: ICD-10-CM

## 2019-02-06 DIAGNOSIS — R14.2 ERUCTATION: ICD-10-CM

## 2019-02-06 DIAGNOSIS — Z80.0 FAMILY HISTORY OF MALIGNANT NEOPLASM OF DIGESTIVE ORGANS: ICD-10-CM

## 2019-02-06 PROCEDURE — 99213 OFFICE O/P EST LOW 20 MIN: CPT | Performed by: INTERNAL MEDICINE

## 2019-03-01 ENCOUNTER — OFFICE VISIT (OUTPATIENT)
Dept: ONCOLOGY | Facility: CLINIC | Age: 54
End: 2019-03-01

## 2019-03-01 ENCOUNTER — APPOINTMENT (OUTPATIENT)
Dept: LAB | Facility: HOSPITAL | Age: 54
End: 2019-03-01

## 2019-03-01 VITALS
OXYGEN SATURATION: 97 % | SYSTOLIC BLOOD PRESSURE: 146 MMHG | BODY MASS INDEX: 29.15 KG/M2 | HEART RATE: 73 BPM | RESPIRATION RATE: 16 BRPM | TEMPERATURE: 98.3 F | HEIGHT: 63 IN | WEIGHT: 164.5 LBS | DIASTOLIC BLOOD PRESSURE: 83 MMHG

## 2019-03-01 DIAGNOSIS — Z17.0 MALIGNANT NEOPLASM OF UPPER-OUTER QUADRANT OF BOTH BREASTS IN FEMALE, ESTROGEN RECEPTOR POSITIVE (HCC): Primary | ICD-10-CM

## 2019-03-01 DIAGNOSIS — C50.411 MALIGNANT NEOPLASM OF UPPER-OUTER QUADRANT OF BOTH BREASTS IN FEMALE, ESTROGEN RECEPTOR POSITIVE (HCC): Primary | ICD-10-CM

## 2019-03-01 DIAGNOSIS — D47.2 MGUS (MONOCLONAL GAMMOPATHY OF UNKNOWN SIGNIFICANCE): ICD-10-CM

## 2019-03-01 DIAGNOSIS — D47.2 MGUS (MONOCLONAL GAMMOPATHY OF UNKNOWN SIGNIFICANCE): Primary | ICD-10-CM

## 2019-03-01 DIAGNOSIS — C50.412 MALIGNANT NEOPLASM OF UPPER-OUTER QUADRANT OF BOTH BREASTS IN FEMALE, ESTROGEN RECEPTOR POSITIVE (HCC): Primary | ICD-10-CM

## 2019-03-01 LAB
BASOPHILS # BLD AUTO: 0.02 10*3/MM3 (ref 0–0.2)
BASOPHILS NFR BLD AUTO: 0.4 % (ref 0–1.5)
DEPRECATED RDW RBC AUTO: 44.7 FL (ref 37–54)
EOSINOPHIL # BLD AUTO: 0.09 10*3/MM3 (ref 0–0.4)
EOSINOPHIL NFR BLD AUTO: 1.7 % (ref 0.3–6.2)
ERYTHROCYTE [DISTWIDTH] IN BLOOD BY AUTOMATED COUNT: 14.5 % (ref 12.3–15.4)
HCT VFR BLD AUTO: 33.6 % (ref 34–46.6)
HGB BLD-MCNC: 11.3 G/DL (ref 12–15.9)
IMM GRANULOCYTES # BLD AUTO: 0.01 10*3/MM3 (ref 0–0.05)
IMM GRANULOCYTES NFR BLD AUTO: 0.2 % (ref 0–0.5)
LYMPHOCYTES # BLD AUTO: 2.31 10*3/MM3 (ref 0.7–3.1)
LYMPHOCYTES NFR BLD AUTO: 43.4 % (ref 19.6–45.3)
MCH RBC QN AUTO: 28.5 PG (ref 26.6–33)
MCHC RBC AUTO-ENTMCNC: 33.6 G/DL (ref 31.5–35.7)
MCV RBC AUTO: 84.6 FL (ref 79–97)
MONOCYTES # BLD AUTO: 0.65 10*3/MM3 (ref 0.1–0.9)
MONOCYTES NFR BLD AUTO: 12.2 % (ref 5–12)
NEUTROPHILS # BLD AUTO: 2.24 10*3/MM3 (ref 1.4–7)
NEUTROPHILS NFR BLD AUTO: 42.1 % (ref 42.7–76)
NRBC BLD AUTO-RTO: 0 /100 WBC (ref 0–0)
PLATELET # BLD AUTO: 228 10*3/MM3 (ref 140–450)
PMV BLD AUTO: 9.7 FL (ref 6–12)
RBC # BLD AUTO: 3.97 10*6/MM3 (ref 3.77–5.28)
WBC NRBC COR # BLD: 5.32 10*3/MM3 (ref 3.4–10.8)

## 2019-03-01 PROCEDURE — 99214 OFFICE O/P EST MOD 30 MIN: CPT | Performed by: INTERNAL MEDICINE

## 2019-03-01 PROCEDURE — 85025 COMPLETE CBC W/AUTO DIFF WBC: CPT | Performed by: INTERNAL MEDICINE

## 2019-03-01 PROCEDURE — 36416 COLLJ CAPILLARY BLOOD SPEC: CPT | Performed by: INTERNAL MEDICINE

## 2019-03-01 RX ORDER — TRIAMCINOLONE ACETONIDE 55 UG/1
SPRAY, METERED NASAL
Refills: 11 | COMMUNITY
Start: 2019-02-18 | End: 2022-09-15

## 2019-03-01 NOTE — PROGRESS NOTES
Subjective      REASONS FOR FOLLOWUP:   1. T2N1M0, ER/HI negative, HER2 positive breast cancer.   2. BRCA 1 and 2 negative.   3. Adriamycin/Cytoxan followed by Taxol and Herceptin, started on 11/08/2013.   4. Syncopal episode after cycle #3 with stable echo, chemotherapy continued.   5. FUO after cycle 4 of chemotherapy associated with severe mouth pain, marked elevation of liver function tests and ferritin to 44,000, stable echocardiogram, stable cardiac function, ?etiology, and query drug reaction versus viral infection.   6. Taxol weekly initiated on 02/13/2014. Close followup of LFTs and cardiac function.   7. Echocardiogram 03/05/2014 stable at 63%. Treatment continued with Taxol and Herceptin.   8. Neuropathy grade 2. Taxol decreased 15% on 03/20/2014.   9. Q.3 week Herceptin started 05/23/2014 with plans for radiation consult.   10. Itching with Herceptin. Hydrocortisone and Pepcid added to care plan.   11. Patient seen 07/25/2014 with mild reduction in ejection fraction noted. Fo llowup echocardiogram scheduled prior to next visit month, Herceptin continued with hydrocortisone and Pepcid pre-medications.   12. Further drop in the ejection fraction to 53%. Because of some dizziness and near syncope, treatment with Herceptin held with planned followup in 1 month with repeat echocardiogram. MRI done for headaches was negative.   13. EF up to 64%. Herceptin resumed. Previous echocardiogram felt to be suboptimal due to lack of contrast.   14. Syncopal episode 12/19/2014 with a negative cardiac evaluation. Neurology consult recommended.   15. MRI done for headaches. Negative in August 2014.   16. CT of the abdomen done at [facility] in 10/2014 shows a liver lesion, which was indeterminate. They recommended MRI. We have compared to a previous MRI in 2014 and established this is hemangioma.                     17.  Recurrent syncope workup found incidental IgG kappa monoclonal                            gammopathy  Workup negative negative         History of Present Illness patient is a 52-year-old lady with a node positive HER-2 positive ER negative breast cancer 5 years from completion of adjuvant chemotherapy.  She comes in today for follow-up and overall she is doing well but   She also has an incidentally found IgG MGUS which we are following and appears to been fairly stable now for the last 9 months with no bone marrow evidence of myeloma or excess plasma cells    She has no new complaints      Active Ambulatory Problems     Diagnosis Date Noted   • Bilateral malignant neoplasm of upper outer quadrant of breast in female (CMS/Formerly [facility]) 04/15/2016   • Chronic pain of left knee 2018   • Acute pain of left knee 2018   • Contusion of left knee 2018   • Arthritis of left knee 2018   • Chondromalacia, patella, left 2018   • MGUS (monoclonal gammopathy of unknown significance) 2018   • Arthritis of left hip 2019   • Left hip pain 2019     Resolved Ambulatory Problems     Diagnosis Date Noted   • No Resolved Ambulatory Problems     Past Medical History:   Diagnosis Date   • Acquired hypothyroidism    • Anemia    • Arthritis    • Asthma    • Cancer (CMS/Formerly [facility])    • H/O Chondromalacia patellae, left    • H/O Regurgitation, mitral/triscupid, mild    • H/O Syncope w/collapse    • Heart murmur    • History of chronic fatigue      SURGICAL HISTORY: Fibroadenoma from the left breast in . Uterine fibroids removed laparoscopically in . Left knee arthroscopy in  and hysterectomy in . Ovaries were left in place.     GYN HISTORY: Menarche age 13.  0. She obviously stopped menstruating at the time of her hysterectomy in  and is having hot flashes.     HEMATOLOGIC/ONCOLOGIC HISTORY:    The  patient initially seen on 10/18/2013, a 48-year-old  female who works as a  at the [place] [place] who has a long  history of fibrocystic breast disease with multiple ultrasounds and mammograms in the past to evaluate c ysts but after her most recent mammogram, because of some abnormalities, an ultrasound was recommended on 07/24/2013 at [facility]. The targeted ultrasound showed 3 circumscribed solid masses in the left breast at the 2 o' clock position, axillar y tail of the breast. The appearance was suggestive of intramammary lymph nodes without a definite fatty hilum which was worrisome for tumor involvement. One of the 3 masses had actually increased in size compared to 2 of the other lesions which were un changed from 2010 suggesting benign etiology. Because of the greater than 20% interval increase in size of one of these nodules since the last mammogram, ultrasound guided biopsy was recommended.   Ultrasound-guided biopsy was done on 08/01/2013 and this biopsy showed findings highly suspicious for lymph node involvement by ductal carcinoma and the patient then went to see Dr. Witt who sent her for another biopsy on 08/19/2013 and a 1.5 cm mass was seen at the 3 o' clock position of the left breast in luan tion to the 3 abnormal axillary masses which were felt to be lymph nodes. This area was biopsied on 08/19/2013 and the path report showed invasive ductal carcinoma and DCIS grade 3 with the largest focus of invasive cancer being 1 cm and DCIS was interme d iate to high grade. The left axillary lymph node was core biopsied and confirmed metastatic carcinoma. ER/FL were negative, HER2 was 3+ positive. This led to an MRI of the breast 08/27/2013 which showed 3 cm mass in the lower outer quadrant of the left breast and additional clump enhancement in the middle and anterior third of the left breast at the 3 o' clock axis suspicious for in situ carcinoma and 3 rounded masses measured up to 9 mm in greatest dimension are noted consistent with lymph nodes. One ha d a clip from the recent biopsy. Right breast showed  stippled foci of variable enhancement with no dominant or clumped findings, prominent right axillary lymph nodes were noted and right axillary lymph node biopsy was done. This was nondiagnostic.   The patient underwent bone scan on 09/09/2013 which was unremarkable except for some increased activity at T9 and the right part of L3 which was again felt to be nonspecific but plain films were recommended. CT scan of the chest, abdomen and pelvis was done which was also unremarkable except for prominent intramammary nodes in the upper outer left breast and several prominent axillary lymph nodes bilaterally one of which has been biopsied on the right. Appearance raised the concern of possible metastatic to both axillae but there were no other sites of involvement. Bone density dated 09/20/2013 was normal. Patient then went for surgery on 09/25/2013 at which time she underwent right mastectomy which was unremarkable and left total mastectomy with axillary dissection which showed a 2.9 cm grade 3 infiltrating ductal carcinoma with associated DCIS, margins were uninvolved. Three of 21 lymph nodes showed metastatic disease making this a pT2N1.   The patient has done well from surgery but has had a lot of prob lems with anxiety and hot flashes but otherwise has done well. She went to see Dr. Cedeno for recommendations and then came to see us for further discussion of treatment options. In addition, the patient had BRCA testing which thankfully was negative.   T he patient had a MUGA scan, which showed an ejection fraction of 50% and no other abnormalities. Her plain films of the abnormal spots on her bone scan were negative. She had port placed and is ready to start treatment. 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   When she had  CT scans of the chest, abdomen and pelvis, they were unremarkable except for hemangioma in the liver and prominent intramammary nodes in the upper outer quadrant of the left breast with several prominent axillary nodes bilaterally.   The patient started dose-dense AC with fairly good tolerance except for some mucositis after cycle 4. She had a febrile illness that required hospitalization from 01/02/2014 - 01/15/2014 with a fever of unknown origin. Multiple viral cultures were negative including CMV, EBV, herpes, respiratory panel and hepatiti s profile was negative. Mouth multiple blood and urine cultures and throat cultures were negative except for Haemophilus parainfluenza for which a course of amoxicillin was given. During the hospitalization the patient spiked fevers continuously and then developed rapid elevation of her liver tests with a ferritin which went up as high as 44,000. The patient defervesced finally and the liver profile started coming down. Echo with bubble echo during her stay was normal with ejection fraction of 60%. The p atchrissy was discharged home with plans to resume chemotherapy cautiously once her numbers stabilized.   The patient was seen on 01/22/2014 feeling much better. She still is not very active, feeling fatigued and deconditioned from lying in bed for almost 2 we eks. The decision was made because of borderline blood pressure of 94/70 and some mild tachycardia to have her be more active and exercise for another week and reevaluate to start Herceptin on 01/30/2014 with Taxol to be added after 2 doses of Herceptin i f she is tolerating the treatment well and her liver functions are normal. A bone marrow biopsy was also done during her hospital stay and no signs of hemophagocytosis or dysplasia although there was some aberrant myeloid antigen of uncertain significanc e which has been seen with myelodysplasia. She was also seen briefly by Dr. Fatmata Tolbert of Rheumatology while in the  [facility] for low positive ARTEMIO of 1:160 and anti-SSA antibody greater than 8 but Dr. Tolbert felt this was unlikely to be rheumatologica l disease because 4 doses of Cytoxan should not have precipitated this.   The patient initiated weekly Herceptin for 2 weeks with no significant problems. Taxol weekly initiated on 02/13/2014 with close followup of LFTs and cardiac function.   The patient was seen on 03/06/2014 with echo showing EF of 63%. We will continue Taxol and Herceptin weekly and watch her LFTs closely. Neurontin causes dizziness and she is not taking it. We will hold off on Effexor for the time being.   Patient required Neupogen for 3 doses because of an ANC of 1.06 with dose 4 and Neupogen x3 is added for each dose.   The patient is seen 03/20/2014 doing fairly well. Taxol dose decreased to 15% because of neuropathy. Neupogen continued.   The patient was seen on 05/23/2014 doing well. Herceptin continued with plans to check an echocardiogram before her next treatment in 3 weeks.   MRI of the liver confirmed hemangioma. Echocardiogram is stable at 59%. Herceptin continued at 3-week intervals with the addition of Pepcid and Solu-Cortef 50 mg and the patient is almost certainly going to agree to radiation for her 3 node status, especially since the pathologist at [facility] thought there was extranodal extension.   The patient was seen 07/25/2014. Echocardiogram results noted, slightly reduced from previous, the patient proceeding through radiation therapy of left chest wall. Followup echocardiogram scheduled after her Herceptin therapy 07/25/2014.   Patient was seen on 08/14/2014 with EF of 53%. I discussed th e case with Dr. Bebe Fernandez and did not use the definitive contrast and this may explain the differences, but based on the fact that she had some near syncope on one occasion this weekend and the fact that she has had radiation to the left chest area with a significant skin burn, we will hold  Herceptin today and bring her back in 4 weeks, but check her LFTs and ferritin again today.   The patient was 11/14/2014 doing well with ejection fraction of 63% and echocardiogram was reviewed. Herceptin will be continued until mid-January.   The patient is seen 12/29/2014. She had a syncopal episode 12/19/2014. ER workup was negative and neurological evaluation was ordered. Bone scan ordered for right hip pain. If this is negative she will have her port removed in J anuary and she is planning to delay reconstruction for a year.   The patient was evaluated for some pain in her back with a bone scan which was essentially unremarkable except for some degenerative changes at L3, and T6-7-8. Because we were concerned, we ordered an MRI of the thoracic and lumbar spine which was benign with just DJD. No further followup is needed at this time with regard to scans.   Patient seen on 05/08/2015, doing well; occasional migraine-type headache persists. Port has been removed. She is thinking about reconstruction sometime at the end of the year.   CT of the abdomen done at [facility] in 10/2014 shows a liver lesion, which was indeterminate. They recommended MRI. We have compared to a previous MRI in 2014 and established this is hemangioma.   Patient seen on 12/18/2015 having had CT scans of the chest and abdo men at [facility] for some abdominal discomfort that showed a 3 cm lesion in the dome of the liver, which they were worried about and recommended an MRI, but we reviewed the scans and also compared to the previous MRI of the liver d one in 2014 in October and confirmed that this indeed was a hemangioma that has been stable since 2013. An echo was also done which showed an ejection fraction of 60% and stable.   7/17   patient is a 51-year-old  female with an ER/AR negative HER-2 positive breast cancer node positive T2 N1 M0 treated with Adriamycin and Cytoxan  followed by Taxol Herceptin was completed in late 2014 .  He is here for routine follow-up and overall she is doing well she was diagnosed with asthma and  is on an inhaler with good improvement.  She has mild hot flashes but has mood swings and menopausal symptoms as reported into menopause with chemotherapy.  She has no unusual aches or pains but otherwise is doing well and working full-time.  She is not interested in reconstruction at this point.  We did discuss doing extended genetic testing because of her family history we referred her for the extended testing but she has not done that yet She did have a screening colonoscopy and this was negative.  DEXA scan was normal in April of this year.  She is still very anxious about recurrence which is understandable but getting better with time.  7/18  0.9 g IgG kappa MGUS noted the negative skeletal survey and urine protein.  Bone marrow planned workup for recurrent syncope underway per cardiology    10/18  having intermittent episodes of syncope which sounds vasovagal the last one was in May when she was at a bar drinking with friends .    she's had an echo and a stress test and an EEG andbrain MRI  which was reportedly benign.  She is currently wearing a event monitor During the workup of these syncopal episodes the neurologist ordered an immunoelectrophoresis which showed an IgG paraprotein of 0.8 g/dL.  Need to evaluate this further but I don't think this is anything to do with her syncope unless she has an autonomic neuropathy but she was sitting down at the time she felt nausea  and had her syncopal episode that does not appear to be orthostatic  We reviewed her skeletal survey which is essentially negative her urine did not show any significant Bence Witt protein she has a 0.9 g/dL M protein with a mildly elevated light chain ratio and a mildly elevated beta-2 microglobulin of 2.5      I recommended a bone marrow biopsy and staining for amyloid and this was  done and her bone marrow showed 5% plasma cells which are monotypic and have a 13q minus but no amyloid deposition-I explained that she has an MGUS but can proceed to myeloma over time especially because she has an abnormal free light chain ratio and we will watch her M protein closely  I'm not sure this explains her syncope        SOCIAL HISTORY: Single. . She does not smoke. Drinks very little. No drug history.     FAMILY HISTORY: Both of her parents are in their 60s. Mother had kidney cancer and had a partial kidney resection. Brother  of head trauma, another is healthy. She has two sisters who are healthy. She has 2 paternal aunts with breast cancer; one is alive in her 70s, the other  at 48 of breast cancer. A paternal uncle with kidney cancer. Paternal grandfather with colon cancer in his 60s. Multiple paternal cousins with cancer inc luding leukemia and kidney cancer. No other history of breast or ovarian cancer in the family.     Review of Systems   Constitutional: Positive for fatigue (better 19).   Respiratory: Positive for shortness of breath (asthma 19). Negative for chest tightness.    Gastrointestinal: Negative for constipation, diarrhea, nausea and vomiting.   Musculoskeletal: Positive for arthralgias (3/1/19), back pain (19) and gait problem (left hip bone on bone 19).   Allergic/Immunologic: Positive for environmental allergies (19).   Neurological: Positive for numbness (19).   Psychiatric/Behavioral: The patient is nervous/anxious (better 19).       A comprehensive 14 point review of systems was performed and was negative except as mentioned.    Medications:  The current medication list was reviewed in the EMR    ALLERGIES:    Allergies   Allergen Reactions   • Ciprofloxacin Hives   • Sulfa Antibiotics Hives   • Amoxicillin Nausea And Vomiting       Objective      Vitals:    19 1620   BP: 146/83   Pulse: 73   Resp: 16   Temp:  "98.3 °F (36.8 °C)   SpO2: 97%   Weight: 74.6 kg (164 lb 8 oz)   Height: 161 cm (63.39\")   PainSc: 0-No pain     Current Status 3/1/2019   ECOG score 0       Physical Exam    GENERAL:  Well-developed, well-nourished in no acute distress.   SKIN:  Warm, dry without rashes, purpura or petechiae.  HEAD:  Normocephalic.  EYES:  Pupils equal, round and reactive to light.  EOMs intact.  Conjunctivae normal.  EARS:  Hearing intact.  NOSE:  Septum midline.  No excoriations or nasal discharge.  MOUTH:  Tongue is well-papillated; no stomatitis or ulcers.  Lips normal.  THROAT:  Oropharynx without lesions or exudates.  NECK:  Supple with good range of motion; no thyromegaly or masses, no JVD.  LYMPHATICS:  No cervical, supraclavicular, axillary or inguinal adenopathy.  CHEST:  Lungs clear to percussion and auscultation. Good airflow.  BREASTS: Bilateral chest wall is benign with no axillary adenopathy  CARDIAC:  Regular rate and rhythm without murmurs, rubs or gallops. Normal S1,S2.  ABDOMEN:  Soft, nontender with no organomegaly or masses.  EXTREMITIES:  No clubbing, cyanosis or edema.  NEUROLOGICAL:  Cranial Nerves II-XII grossly intact.  No focal neurological deficits.  PSYCHIATRIC:  Normal affect and mood.        RECENT LABS:  Hematology WBC   Date Value Ref Range Status   03/01/2019 5.32 3.40 - 10.80 10*3/mm3 Final     RBC   Date Value Ref Range Status   03/01/2019 3.97 3.77 - 5.28 10*6/mm3 Final     Hemoglobin   Date Value Ref Range Status   03/01/2019 11.3 (L) 12.0 - 15.9 g/dL Final     Hematocrit   Date Value Ref Range Status   03/01/2019 33.6 (L) 34.0 - 46.6 % Final     Platelets   Date Value Ref Range Status   03/01/2019 228 140 - 450 10*3/mm3 Final   2D echo and stress echo normal in 5 /18       BONE SURVEY  This report was finalized on 6/25/2018 A standard bone survey was obtained. No discrete lytic or blastic  lesions are identified. There are moderately extensive degenerative disc  changes at the C4-C5 and C5-C6 " and C6-C7 levels and at multiple levels  throughout the thoracic spine. No fractures are identified.     IMPRESSIONS: Unremarkable bone survey except for degenerative disc  changes as noted.     This report was finalized on 6/25/2018    Assessment/Plan   1.  T2 N1 ER/TX negative HER-2 positive left breast cancer 2013 treated with Adriamycin Cytoxan Taxol Herceptin and bilateral mastectomies plus radiation 4.5 Years from diagnosis  2.  BRCA negative  3.  Hemangioma of the liver  4.  family history of malignancy  5.  Mild chronic anemia  6.  Anxiety intolerant of Effexor ?  Zyprexa 2.5 mg   7. Recurrent syncope without obvious precipitating cause workup underway  8.  Incidental finding of a monoclonal gammopathy IgG kappa skeletal survey negative bone marrow showing 5% plasma cells with no amyloid      Plan  1.return for myeloma parameters in 3 months and 6 months  2.  see me in 6 months         .          3/1/2019      CC:

## 2019-04-09 ENCOUNTER — TELEPHONE (OUTPATIENT)
Dept: ORTHOPEDIC SURGERY | Facility: CLINIC | Age: 54
End: 2019-04-09

## 2019-04-09 NOTE — TELEPHONE ENCOUNTER
Spoke witrh patient and advised her the parking permit was ready and available @ the  for pick-up.[email or web address]

## 2019-04-26 RX ORDER — MELOXICAM 15 MG/1
TABLET ORAL
Qty: 90 TABLET | Refills: 3 | Status: SHIPPED | OUTPATIENT
Start: 2019-04-26 | End: 2020-08-20

## 2019-05-31 ENCOUNTER — LAB (OUTPATIENT)
Dept: LAB | Facility: HOSPITAL | Age: 54
End: 2019-05-31

## 2019-05-31 DIAGNOSIS — Z17.0 MALIGNANT NEOPLASM OF UPPER-OUTER QUADRANT OF BOTH BREASTS IN FEMALE, ESTROGEN RECEPTOR POSITIVE (HCC): ICD-10-CM

## 2019-05-31 DIAGNOSIS — C50.412 MALIGNANT NEOPLASM OF UPPER-OUTER QUADRANT OF BOTH BREASTS IN FEMALE, ESTROGEN RECEPTOR POSITIVE (HCC): ICD-10-CM

## 2019-05-31 DIAGNOSIS — D47.2 MGUS (MONOCLONAL GAMMOPATHY OF UNKNOWN SIGNIFICANCE): ICD-10-CM

## 2019-05-31 DIAGNOSIS — C50.411 MALIGNANT NEOPLASM OF UPPER-OUTER QUADRANT OF BOTH BREASTS IN FEMALE, ESTROGEN RECEPTOR POSITIVE (HCC): ICD-10-CM

## 2019-05-31 LAB
B2 MICROGLOB SERPL-MCNC: 1.9 MG/L (ref 0.8–2.2)
BASOPHILS # BLD AUTO: 0.03 10*3/MM3 (ref 0–0.2)
BASOPHILS NFR BLD AUTO: 0.3 % (ref 0–1.5)
DEPRECATED RDW RBC AUTO: 42.3 FL (ref 37–54)
EOSINOPHIL # BLD AUTO: 0.04 10*3/MM3 (ref 0–0.4)
EOSINOPHIL NFR BLD AUTO: 0.5 % (ref 0.3–6.2)
ERYTHROCYTE [DISTWIDTH] IN BLOOD BY AUTOMATED COUNT: 13.5 % (ref 12.3–15.4)
HCT VFR BLD AUTO: 37.2 % (ref 34–46.6)
HGB BLD-MCNC: 12.2 G/DL (ref 12–15.9)
IMM GRANULOCYTES # BLD AUTO: 0.02 10*3/MM3 (ref 0–0.05)
IMM GRANULOCYTES NFR BLD AUTO: 0.2 % (ref 0–0.5)
LYMPHOCYTES # BLD AUTO: 3.15 10*3/MM3 (ref 0.7–3.1)
LYMPHOCYTES NFR BLD AUTO: 36.2 % (ref 19.6–45.3)
MCH RBC QN AUTO: 28.4 PG (ref 26.6–33)
MCHC RBC AUTO-ENTMCNC: 32.8 G/DL (ref 31.5–35.7)
MCV RBC AUTO: 86.5 FL (ref 79–97)
MONOCYTES # BLD AUTO: 0.77 10*3/MM3 (ref 0.1–0.9)
MONOCYTES NFR BLD AUTO: 8.9 % (ref 5–12)
NEUTROPHILS # BLD AUTO: 4.69 10*3/MM3 (ref 1.7–7)
NEUTROPHILS NFR BLD AUTO: 53.9 % (ref 42.7–76)
NRBC BLD AUTO-RTO: 0 /100 WBC (ref 0–0.2)
PLATELET # BLD AUTO: 255 10*3/MM3 (ref 140–450)
PMV BLD AUTO: 9.3 FL (ref 6–12)
RBC # BLD AUTO: 4.3 10*6/MM3 (ref 3.77–5.28)
WBC NRBC COR # BLD: 8.7 10*3/MM3 (ref 3.4–10.8)

## 2019-05-31 PROCEDURE — 36415 COLL VENOUS BLD VENIPUNCTURE: CPT | Performed by: INTERNAL MEDICINE

## 2019-05-31 PROCEDURE — 82232 ASSAY OF BETA-2 PROTEIN: CPT | Performed by: INTERNAL MEDICINE

## 2019-05-31 PROCEDURE — 85025 COMPLETE CBC W/AUTO DIFF WBC: CPT | Performed by: INTERNAL MEDICINE

## 2019-06-03 LAB
ALBUMIN SERPL-MCNC: 3.9 G/DL (ref 2.9–4.4)
ALBUMIN/GLOB SERPL: 1 {RATIO} (ref 0.7–1.7)
ALPHA1 GLOB FLD ELPH-MCNC: 0.2 G/DL (ref 0–0.4)
ALPHA2 GLOB SERPL ELPH-MCNC: 0.9 G/DL (ref 0.4–1)
B-GLOBULIN SERPL ELPH-MCNC: 1.1 G/DL (ref 0.7–1.3)
GAMMA GLOB SERPL ELPH-MCNC: 1.8 G/DL (ref 0.4–1.8)
GLOBULIN SER CALC-MCNC: 4 G/DL (ref 2.2–3.9)
IGA SERPL-MCNC: 157 MG/DL (ref 87–352)
IGG SERPL-MCNC: 1721 MG/DL (ref 700–1600)
IGM SERPL-MCNC: 48 MG/DL (ref 26–217)
INTERPRETATION SERPL IEP-IMP: ABNORMAL
KAPPA LC SERPL-MCNC: 51 MG/L (ref 3.3–19.4)
KAPPA LC/LAMBDA SER: 5.1 {RATIO} (ref 0.26–1.65)
LAMBDA LC FREE SERPL-MCNC: 10 MG/L (ref 5.7–26.3)
Lab: ABNORMAL
M-SPIKE: 1 G/DL
PROT SERPL-MCNC: 7.9 G/DL (ref 6–8.5)

## 2019-06-07 ENCOUNTER — TELEPHONE (OUTPATIENT)
Dept: ORTHOPEDIC SURGERY | Facility: CLINIC | Age: 54
End: 2019-06-07

## 2019-06-07 DIAGNOSIS — M16.12 ARTHRITIS OF LEFT HIP: Primary | ICD-10-CM

## 2019-06-07 DIAGNOSIS — M17.12 ARTHRITIS OF LEFT KNEE: ICD-10-CM

## 2019-06-07 NOTE — PROGRESS NOTES
Injury and was seen by Dr. Almaguer in January in the office for her left hip pain.  At that time he offered a hip injection and some physical therapy but she did not have insurance coverage at the time to do that.  Her hip pain is now affecting her daily life and her ability to ambulate and she is having left knee pain as well which she is also seen Dr. Almaguer for in the past reviewed her x-rays of her hip with severe osteoarthritis in the left hip moderate arthritis in the left knee.  Discussed going ahead and ordering the left hip injection in radiology for her and what to look for with it immediate numbing and how much relief it gives her.  I will also put an order for physical therapy for her to start a few days after her hip injection for her hip and her knee if these interventions are not helping she should call back and set up a follow-up appointment to discuss further interventions.  She verbalized understanding of these instructions and thanked me for the call today.

## 2019-06-12 ENCOUNTER — TELEPHONE (OUTPATIENT)
Dept: ORTHOPEDIC SURGERY | Facility: CLINIC | Age: 54
End: 2019-06-12

## 2019-06-12 NOTE — TELEPHONE ENCOUNTER
I recommend continuing with the Meloxicam as needed until the hip gets better. Take with food to protect the stomach.

## 2019-06-20 ENCOUNTER — HOSPITAL ENCOUNTER (OUTPATIENT)
Dept: GENERAL RADIOLOGY | Facility: HOSPITAL | Age: 54
Discharge: HOME | End: 2019-06-20
Admitting: NURSE PRACTITIONER

## 2019-06-20 DIAGNOSIS — M16.12 ARTHRITIS OF LEFT HIP: ICD-10-CM

## 2019-06-20 PROCEDURE — 25010000002 METHYLPREDNISOLONE PER 125 MG: Performed by: RADIOLOGY

## 2019-06-20 PROCEDURE — 25010000002 IOPAMIDOL 61 % SOLUTION: Performed by: RADIOLOGY

## 2019-06-20 PROCEDURE — 77002 NEEDLE LOCALIZATION BY XRAY: CPT

## 2019-06-20 RX ORDER — BUPIVACAINE HYDROCHLORIDE 2.5 MG/ML
10 INJECTION, SOLUTION EPIDURAL; INFILTRATION; INTRACAUDAL ONCE
Status: COMPLETED | OUTPATIENT
Start: 2019-06-20 | End: 2019-06-20

## 2019-06-20 RX ORDER — LIDOCAINE HYDROCHLORIDE 10 MG/ML
10 INJECTION, SOLUTION INFILTRATION; PERINEURAL ONCE
Status: COMPLETED | OUTPATIENT
Start: 2019-06-20 | End: 2019-06-20

## 2019-06-20 RX ORDER — METHYLPREDNISOLONE SODIUM SUCCINATE 125 MG/2ML
80 INJECTION, POWDER, LYOPHILIZED, FOR SOLUTION INTRAMUSCULAR; INTRAVENOUS
Status: COMPLETED | OUTPATIENT
Start: 2019-06-20 | End: 2019-06-20

## 2019-06-20 RX ADMIN — IOPAMIDOL 1 ML: 612 INJECTION, SOLUTION INTRAVENOUS at 10:51

## 2019-06-20 RX ADMIN — LIDOCAINE HYDROCHLORIDE 5 ML: 10 INJECTION, SOLUTION INFILTRATION; PERINEURAL at 10:51

## 2019-06-20 RX ADMIN — BUPIVACAINE HYDROCHLORIDE 5 ML: 2.5 INJECTION, SOLUTION EPIDURAL; INFILTRATION; INTRACAUDAL; PERINEURAL at 10:51

## 2019-06-20 RX ADMIN — METHYLPREDNISOLONE SODIUM SUCCINATE 80 MG: 125 INJECTION, POWDER, LYOPHILIZED, FOR SOLUTION INTRAMUSCULAR; INTRAVENOUS at 10:51

## 2019-06-28 ENCOUNTER — TELEPHONE (OUTPATIENT)
Dept: ORTHOPEDIC SURGERY | Facility: CLINIC | Age: 54
End: 2019-06-28

## 2019-08-08 ENCOUNTER — TELEPHONE (OUTPATIENT)
Dept: ORTHOPEDIC SURGERY | Facility: CLINIC | Age: 54
End: 2019-08-08

## 2019-08-16 ENCOUNTER — LAB (OUTPATIENT)
Dept: LAB | Facility: HOSPITAL | Age: 54
End: 2019-08-16

## 2019-08-16 DIAGNOSIS — D47.2 MGUS (MONOCLONAL GAMMOPATHY OF UNKNOWN SIGNIFICANCE): ICD-10-CM

## 2019-08-16 LAB
ALBUMIN SERPL-MCNC: 4.2 G/DL (ref 3.5–5.2)
ALBUMIN/GLOB SERPL: 1.2 G/DL (ref 1.1–2.4)
ALP SERPL-CCNC: 63 U/L (ref 38–116)
ALT SERPL W P-5'-P-CCNC: 13 U/L (ref 0–33)
ANION GAP SERPL CALCULATED.3IONS-SCNC: 10.9 MMOL/L (ref 5–15)
AST SERPL-CCNC: 20 U/L (ref 0–32)
BASOPHILS # BLD AUTO: 0.02 10*3/MM3 (ref 0–0.2)
BASOPHILS NFR BLD AUTO: 0.5 % (ref 0–1.5)
BILIRUB SERPL-MCNC: 0.4 MG/DL (ref 0.2–1.2)
BUN BLD-MCNC: 18 MG/DL (ref 6–20)
BUN/CREAT SERPL: 21.7 (ref 7.3–30)
CALCIUM SPEC-SCNC: 9 MG/DL (ref 8.5–10.2)
CHLORIDE SERPL-SCNC: 104 MMOL/L (ref 98–107)
CO2 SERPL-SCNC: 26.1 MMOL/L (ref 22–29)
CREAT BLD-MCNC: 0.83 MG/DL (ref 0.6–1.1)
DEPRECATED RDW RBC AUTO: 46.8 FL (ref 37–54)
EOSINOPHIL # BLD AUTO: 0.09 10*3/MM3 (ref 0–0.4)
EOSINOPHIL NFR BLD AUTO: 2.3 % (ref 0.3–6.2)
ERYTHROCYTE [DISTWIDTH] IN BLOOD BY AUTOMATED COUNT: 14.2 % (ref 12.3–15.4)
GFR SERPL CREATININE-BSD FRML MDRD: 87 ML/MIN/1.73
GLOBULIN UR ELPH-MCNC: 3.4 GM/DL (ref 1.8–3.5)
GLUCOSE BLD-MCNC: 90 MG/DL (ref 74–124)
HCT VFR BLD AUTO: 35.9 % (ref 34–46.6)
HGB BLD-MCNC: 11.4 G/DL (ref 12–15.9)
IMM GRANULOCYTES # BLD AUTO: 0 10*3/MM3 (ref 0–0.05)
IMM GRANULOCYTES NFR BLD AUTO: 0 % (ref 0–0.5)
LYMPHOCYTES # BLD AUTO: 1.36 10*3/MM3 (ref 0.7–3.1)
LYMPHOCYTES NFR BLD AUTO: 34 % (ref 19.6–45.3)
MCH RBC QN AUTO: 28.6 PG (ref 26.6–33)
MCHC RBC AUTO-ENTMCNC: 31.8 G/DL (ref 31.5–35.7)
MCV RBC AUTO: 90 FL (ref 79–97)
MONOCYTES # BLD AUTO: 0.48 10*3/MM3 (ref 0.1–0.9)
MONOCYTES NFR BLD AUTO: 12 % (ref 5–12)
NEUTROPHILS # BLD AUTO: 2.05 10*3/MM3 (ref 1.7–7)
NEUTROPHILS NFR BLD AUTO: 51.2 % (ref 42.7–76)
NRBC BLD AUTO-RTO: 0 /100 WBC (ref 0–0.2)
PLATELET # BLD AUTO: 209 10*3/MM3 (ref 140–450)
PMV BLD AUTO: 8.9 FL (ref 6–12)
POTASSIUM BLD-SCNC: 4.1 MMOL/L (ref 3.5–4.7)
PROT SERPL-MCNC: 7.6 G/DL (ref 6.3–8)
RBC # BLD AUTO: 3.99 10*6/MM3 (ref 3.77–5.28)
SODIUM BLD-SCNC: 141 MMOL/L (ref 134–145)
WBC NRBC COR # BLD: 4 10*3/MM3 (ref 3.4–10.8)

## 2019-08-16 PROCEDURE — 80053 COMPREHEN METABOLIC PANEL: CPT

## 2019-08-16 PROCEDURE — 85025 COMPLETE CBC W/AUTO DIFF WBC: CPT

## 2019-08-16 PROCEDURE — 36415 COLL VENOUS BLD VENIPUNCTURE: CPT

## 2019-08-19 LAB
ALBUMIN SERPL-MCNC: 4 G/DL (ref 2.9–4.4)
ALBUMIN/GLOB SERPL: 1.2 {RATIO} (ref 0.7–1.7)
ALPHA1 GLOB FLD ELPH-MCNC: 0.2 G/DL (ref 0–0.4)
ALPHA2 GLOB SERPL ELPH-MCNC: 0.7 G/DL (ref 0.4–1)
B-GLOBULIN SERPL ELPH-MCNC: 1 G/DL (ref 0.7–1.3)
GAMMA GLOB SERPL ELPH-MCNC: 1.7 G/DL (ref 0.4–1.8)
GLOBULIN SER CALC-MCNC: 3.5 G/DL (ref 2.2–3.9)
IGA SERPL-MCNC: 143 MG/DL (ref 87–352)
IGG SERPL-MCNC: 1777 MG/DL (ref 700–1600)
IGM SERPL-MCNC: 42 MG/DL (ref 26–217)
INTERPRETATION SERPL IEP-IMP: ABNORMAL
KAPPA LC SERPL-MCNC: 64.4 MG/L (ref 3.3–19.4)
KAPPA LC/LAMBDA SER: 5.37 {RATIO} (ref 0.26–1.65)
LAMBDA LC FREE SERPL-MCNC: 12 MG/L (ref 5.7–26.3)
Lab: ABNORMAL
M-SPIKE: 0.9 G/DL
PROT SERPL-MCNC: 7.5 G/DL (ref 6–8.5)

## 2019-08-22 ENCOUNTER — OFFICE VISIT (OUTPATIENT)
Dept: ORTHOPEDIC SURGERY | Facility: CLINIC | Age: 54
End: 2019-08-22

## 2019-08-22 ENCOUNTER — TELEPHONE (OUTPATIENT)
Dept: ORTHOPEDIC SURGERY | Facility: CLINIC | Age: 54
End: 2019-08-22

## 2019-08-22 VITALS — HEIGHT: 63 IN | BODY MASS INDEX: 27.46 KG/M2 | WEIGHT: 155 LBS

## 2019-08-22 DIAGNOSIS — M25.552 LEFT HIP PAIN: Primary | ICD-10-CM

## 2019-08-22 PROCEDURE — 73502 X-RAY EXAM HIP UNI 2-3 VIEWS: CPT | Performed by: ORTHOPAEDIC SURGERY

## 2019-08-22 PROCEDURE — 99213 OFFICE O/P EST LOW 20 MIN: CPT | Performed by: ORTHOPAEDIC SURGERY

## 2019-08-22 RX ORDER — EPINEPHRINE 0.3 MG/.3ML
INJECTION SUBCUTANEOUS
Refills: 0 | COMMUNITY
Start: 2019-06-03

## 2019-08-23 ENCOUNTER — OFFICE VISIT (OUTPATIENT)
Dept: ONCOLOGY | Facility: CLINIC | Age: 54
End: 2019-08-23

## 2019-08-23 ENCOUNTER — APPOINTMENT (OUTPATIENT)
Dept: LAB | Facility: HOSPITAL | Age: 54
End: 2019-08-23

## 2019-08-23 VITALS
HEIGHT: 63 IN | OXYGEN SATURATION: 98 % | BODY MASS INDEX: 29.96 KG/M2 | SYSTOLIC BLOOD PRESSURE: 128 MMHG | TEMPERATURE: 98.5 F | WEIGHT: 169.1 LBS | HEART RATE: 70 BPM | RESPIRATION RATE: 16 BRPM | DIASTOLIC BLOOD PRESSURE: 78 MMHG

## 2019-08-23 DIAGNOSIS — C50.412 MALIGNANT NEOPLASM OF UPPER-OUTER QUADRANT OF BOTH BREASTS IN FEMALE, ESTROGEN RECEPTOR POSITIVE (HCC): Primary | ICD-10-CM

## 2019-08-23 DIAGNOSIS — D47.2 MGUS (MONOCLONAL GAMMOPATHY OF UNKNOWN SIGNIFICANCE): ICD-10-CM

## 2019-08-23 DIAGNOSIS — C50.411 MALIGNANT NEOPLASM OF UPPER-OUTER QUADRANT OF BOTH BREASTS IN FEMALE, ESTROGEN RECEPTOR POSITIVE (HCC): Primary | ICD-10-CM

## 2019-08-23 DIAGNOSIS — Z17.0 MALIGNANT NEOPLASM OF UPPER-OUTER QUADRANT OF BOTH BREASTS IN FEMALE, ESTROGEN RECEPTOR POSITIVE (HCC): Primary | ICD-10-CM

## 2019-08-23 PROCEDURE — G0463 HOSPITAL OUTPT CLINIC VISIT: HCPCS | Performed by: INTERNAL MEDICINE

## 2019-08-23 PROCEDURE — 99214 OFFICE O/P EST MOD 30 MIN: CPT | Performed by: INTERNAL MEDICINE

## 2019-08-23 NOTE — PROGRESS NOTES
Patient Name: Santos Witt   YOB: 1965  Referring Primary Care Physician: Sheridan Richardson MD  BMI: Body mass index is 27.46 kg/m².    Chief Complaint:    Chief Complaint   Patient presents with   • Left Hip - Follow-up, Pain        HPI:     Santos Witt is a 54 y.o. female who presents today for evaluation of   Chief Complaint   Patient presents with   • Left Hip - Follow-up, Pain   .  She was seen back today to check on her left hip.  She had an injection which did okay and some meloxicam.  Get some trekking poles and try to get some moderate exercise.  Currently is fairly tolerable.  Have some achy pain.    This problem is not new to this examiner.     Subjective   Medications:   Home Medications:  Current Outpatient Medications on File Prior to Visit   Medication Sig   • albuterol (PROVENTIL) (2.5 MG/3ML) 0.083% nebulizer solution Inhale 2.5 mg As Needed.   • ALBUTEROL IN Inhale as needed.   • ascorbic acid (VITAMIN C) 500 MG tablet Take 1,000 mg by mouth Daily.   • B Complex Vitamins (VITAMIN B COMPLEX) capsule capsule Take  by mouth Daily.   • Calcium-Magnesium-Vitamin D 400-166.7-133.3 MG-MG-UNIT tablet Take  by mouth.   • Cholecalciferol (VITAMIN D3) 5000 UNITS capsule capsule Take 5,000 Units by mouth 1 (One) Time Per Week.   • COCONUT OIL PO Take  by mouth.   • levothyroxine (SYNTHROID, LEVOTHROID) 75 MCG tablet Take 75 mcg by mouth.   • MAGNESIUM PO Take  by mouth Daily.   • meloxicam (MOBIC) 15 MG tablet TAKE ONE TABLET BY MOUTH DAILY WITH FOOD   • Multiple Vitamin (MULTI-VITAMINS PO) Take  by mouth daily.   • PATIENT SUPPLIED ALLERGY INJECTION Inject  under the skin into the appropriate area as directed 1 (One) Time.   • saccharomyces boulardii (FLORASTOR) 250 MG capsule Take 250 mg by mouth.   • Triamcinolone Acetonide (NASACORT) 55 MCG/ACT nasal inhaler APPLY 2 SPRAYS BY NASAL ROUTE DAILY FOR 30 DAYS   • [DISCONTINUED] BLACK CURRANT SEED OIL PO Take  by mouth.   •  [DISCONTINUED] Fluticasone Furoate-Vilanterol (BREO ELLIPTA IN) Inhale As Needed.   • [DISCONTINUED] Misc Natural Products (VALERIAN ROOT/PASSION FLOWER PO) Take  by mouth.   • [DISCONTINUED] montelukast (SINGULAIR) 10 MG tablet Take 10 mg by mouth every night.   • [DISCONTINUED] NON FORMULARY Daily. 2% Iodine drops.   • EPINEPHrine (EPIPEN) 0.3 MG/0.3ML solution auto-injector injection INJECT 0.3 MILLILITER (0.3 MG) BY INTRAMUSCULAR ROUTE ONCE AS NEEDED FOR ANAPHYLAXIS   • [DISCONTINUED] MethylPREDNISolone (MEDROL, DIEGO,) 4 MG tablet Take as directed on package instructions.     No current facility-administered medications on file prior to visit.      Current Medications:  Scheduled Meds:  Continuous Infusions:  No current facility-administered medications for this visit.   PRN Meds:.    I have reviewed the patient's medical history in detail and updated the computerized patient record.  Review and summarization of old records includes:    Past Medical History:   Diagnosis Date   • Acquired hypothyroidism    • Anemia     History of anemia   • Arthritis     Knees   • Asthma    • Cancer (CMS/formerly [facility]) 2013    Left breast cancer, T2NM0   • H/O Chondromalacia patellae, left    • H/O Regurgitation, mitral/triscupid, mild    • H/O Syncope w/collapse    • Heart murmur     Childhood   • History of chronic fatigue         Past Surgical History:   Procedure Laterality Date   • BREAST BIOPSY Left 08/2013    x2    • BREAST CYST EXCISION Left 1982    Fibroid cyst left nipple   • BREAST SURGERY Bilateral 09/2013    Mastectomy   • HYSTERECTOMY  06/2011    Partial   • KNEE ARTHROSCOPY Left 2006   • PELVIC LAPAROSCOPY  2002    cysts removed        Social History     Occupational History   • Occupation:      Comment: [employer]   Tobacco Use   • Smoking status: Never Smoker   • Smokeless tobacco: Never Used   Substance and Sexual Activity   • Alcohol use: Yes     Comment: Very little   • Drug use: No   • Sexual  "activity: Defer    Social History     Social History Narrative   • Not on file        Family History   Problem Relation Age of Onset   • Kidney cancer Mother         Treated surgically   • Heart disease Mother    • Hypertension Mother    • Thyroid disease Mother    • Hypertension Father    • Thyroid disease Sister    • No Known Problems Brother    • Breast cancer Paternal Aunt    • Kidney cancer Paternal Uncle    • Colon cancer Paternal Grandfather         In his 60s   • Breast cancer Paternal Aunt    • Diabetes Maternal Aunt        ROS: 14 point review of systems was performed and all other systems were reviewed and are negative except for documented findings in HPI and today's encounter.     Allergies:   Allergies   Allergen Reactions   • Ciprofloxacin Hives   • Sulfa Antibiotics Hives   • Amoxicillin Nausea And Vomiting     Constitutional:  Denies fever, shaking or chills   Eyes:  Denies change in visual acuity   HENT:  Denies nasal congestion or sore throat   Respiratory:  Denies cough or shortness of breath   Cardiovascular:  Denies chest pain or severe LE edema   GI:  Denies abdominal pain, nausea, vomiting, bloody stools or diarrhea   Musculoskeletal:  Numbness, tingling, pain, or loss of motor function only as noted above in history of present illness.  : Denies painful urination or hematuria  Integument:  Denies rash, lesion or ulceration   Neurologic:  Denies headache or focal weakness  Endocrine:  Denies lymphadenopathy  Psych:  Denies confusion or change in mental status   Hem:  Denies active bleeding    OBJECTIVE:  Physical Exam:   Ht 160 cm (63\")   Wt 70.3 kg (155 lb)   BMI 27.46 kg/m²     General Appearance:    Alert, cooperative, in no acute distress                  Eyes: conjunctiva clear  ENT: external ears and nose atraumatic  CV: no peripheral edema  Resp: normal respiratory effort  Skin: no rashes or wounds; normal turgor  Psych: mood and affect appropriate  Lymph: no nodes " "appreciated  Neuro: gross sensation intact  Vascular:  Palpable peripheral pulse in noted extremity  Musculoskeletal Extremities: Lamination today shows Kiana's moderately positive she has decreased internal rotation with some discomfort is able to walk with a slight start up limp    Radiology:   AP of the hips lateral left hip taken today discussed with the patient compared to old x-rays show advanced end-stage \"bone-on-bone\" arthritis in the superior weight bearing portion of the hip    Assessment:     ICD-10-CM ICD-9-CM   1. Left hip pain M25.552 719.45        Procedures       Plan: Biomechanics of pertinent body area discussed.  Risks, benefits, alternatives, comparisons, and complications of accepted medicines, injections, recommendations, surgical procedures, and therapies explained and education provided in laymen's terms. Natural history and expected course of this patient's diagnosis discussed along with evaluation of therapies. Questions answered. When appropriate I also discussed proper use of cane, walker, trekking poles.   BMI:  The concept of BMI body mass index and its importance and implications discussed.  BMI suggested to be < 40 or as low as possible. Lifestyle measures for weight loss and how this affects orthopedic condition.  EXERCISES:  Advice on benefits of, and types of regular/moderate exercise including biomechanical forces involved as it pertains to this complaint.  MEDICATIONS:  Prescription, OTC and Monitoring of Medications per orders to address ortho complaints; Evaluation and discussion of safety, precautions, side effects, and warnings given especially of long term NSAID or steroid therapy.    RICE: Rest, ice, compression, and elevation therapy, Cryotherapy/brachy therapy, and or OTC linaments as indicated with instructions. I discussed total hip arthroplasty with her went over the procedure risk benefits and answer questions.  She is not ready to entertain that.  Would " suggest to see her back with x-rays.      8/23/2019    Much of this encounter note is an electronic transcription/translation of spoken language to printed text. The electronic translation of spoken language may permit erroneous, or at times, nonsensical words or phrases to be inadvertently transcribed; Although I have reviewed the note for such errors, some may still exist

## 2019-08-23 NOTE — PROGRESS NOTES
Subjective      REASONS FOR FOLLOWUP:   1. T2N1M0, ER/ND negative, HER2 positive breast cancer.   2. BRCA 1 and 2 negative.   3. Adriamycin/Cytoxan followed by Taxol and Herceptin, started on 11/08/2013.   4. Syncopal episode after cycle #3 with stable echo, chemotherapy continued.   5. FUO after cycle 4 of chemotherapy associated with severe mouth pain, marked elevation of liver function tests and ferritin to 44,000, stable echocardiogram, stable cardiac function, ?etiology, and query drug reaction versus viral infection.   6. Taxol weekly initiated on 02/13/2014. Close followup of LFTs and cardiac function.   7. Echocardiogram 03/05/2014 stable at 63%. Treatment continued with Taxol and Herceptin.   8. Neuropathy grade 2. Taxol decreased 15% on 03/20/2014.   9. Q.3 week Herceptin started 05/23/2014 with plans for radiation consult.   10. Itching with Herceptin. Hydrocortisone and Pepcid added to care plan.   11. Patient seen 07/25/2014 with mild reduction in ejection fraction noted. Fo llowup echocardiogram scheduled prior to next visit month, Herceptin continued with hydrocortisone and Pepcid pre-medications.   12. Further drop in the ejection fraction to 53%. Because of some dizziness and near syncope, treatment with Herceptin held with planned followup in 1 month with repeat echocardiogram. MRI done for headaches was negative.   13. EF up to 64%. Herceptin resumed. Previous echocardiogram felt to be suboptimal due to lack of contrast.   14. Syncopal episode 12/19/2014 with a negative cardiac evaluation. Neurology consult recommended.   15. MRI done for headaches. Negative in August 2014.   16. CT of the abdomen done at [facility] in 10/2014 shows a liver lesion, which was indeterminate. They recommended MRI. We have compared to a previous MRI in 2014 and established this is hemangioma.                     17. MGUS Recurrent syncope workup found incidental IgG kappa                               monoclonal  gammopathy  Workup negative negative         History of Present Illness patient is a 52-year-old lady with a node positive HER-2 positive ER negative breast cancer 5 years from completion of adjuvant chemotherapy.      She comes in today for follow-up and overall she is doing well but   She also has an incidentally found IgG MGUS which we are following and appears to been fairly stable now for the last 12 months with no bone marrow evidence of myeloma or excess plasma cells.  Her M protein is 0.9 g which is the same as it was last year and her free light chain ratio is abnormal but stable    She has no new complaints-she had a rash in both axilla over the last month and I suspect this is a heat rash as it has resolved    She remains very anxious about recurrence of her breast cancer and I told her 5 years out from an ER ME negative HER-2 positive breast cancer would be a good time point and she is almost certainly cured and since she has had bilateral mastectomies new cancers not at all likely    Her CBC stable except for mild anemia and she has had this in the past and we will keep an eye on it and check parameters in 3 months      Active Ambulatory Problems     Diagnosis Date Noted   • Bilateral malignant neoplasm of upper outer quadrant of breast in female (CMS/[facility]) 04/15/2016   • Chronic pain of left knee 01/11/2018   • Acute pain of left knee 01/11/2018   • Contusion of left knee 01/11/2018   • Arthritis of left knee 02/09/2018   • Chondromalacia, patella, left 02/09/2018   • MGUS (monoclonal gammopathy of unknown significance) 06/08/2018   • Arthritis of left hip 01/16/2019   • Left hip pain 01/16/2019     Resolved Ambulatory Problems     Diagnosis Date Noted   • No Resolved Ambulatory Problems     Past Medical History:   Diagnosis Date   • Acquired hypothyroidism    • Anemia    • Arthritis    • Asthma    • Cancer (CMS/[facility]) 2013   • H/O Chondromalacia patellae, left    • H/O Regurgitation,  mitral/triscupid, mild    • H/O Syncope w/collapse    • Heart murmur    • History of chronic fatigue      SURGICAL HISTORY: Fibroadenoma from the left breast in . Uterine fibroids removed laparoscopically in . Left knee arthroscopy in  and hysterectomy in . Ovaries were left in place.     GYN HISTORY: Menarche age 13.  0. She obviously stopped menstruating at the time of her hysterectomy in  and is having hot flashes.     HEMATOLOGIC/ONCOLOGIC HISTORY:    The  patient initially seen on 10/18/2013, a 48-year-old  female who works as a  at the [employer] ' s office who has a long history of fibrocystic breast disease with multiple ultrasounds and mammograms in the past to evaluate c ysts but after her most recent mammogram, because of some abnormalities, an ultrasound was recommended on 2013 at [facility]. The targeted ultrasound showed 3 circumscribed solid masses in the left breast at the 2 o' clock position, axillar y tail of the breast. The appearance was suggestive of intramammary lymph nodes without a definite fatty hilum which was worrisome for tumor involvement. One of the 3 masses had actually increased in size compared to 2 of the other lesions which were un changed from 2010 suggesting benign etiology. Because of the greater than 20% interval increase in size of one of these nodules since the last mammogram, ultrasound guided biopsy was recommended.   Ultrasound-guided biopsy was done on 2013 and this biopsy showed findings highly suspicious for lymph node involvement by ductal carcinoma and the patient then went to see Dr. Witt who sent her for another biopsy on 2013 and a 1.5 cm mass was seen at the 3 o' clock position of the left breast in luan tion to the 3 abnormal axillary masses which were felt to be lymph nodes. This area was biopsied on 2013 and the path report showed invasive ductal carcinoma and DCIS  grade 3 with the largest focus of invasive cancer being 1 cm and DCIS was interme d iate to high grade. The left axillary lymph node was core biopsied and confirmed metastatic carcinoma. ER/DC were negative, HER2 was 3+ positive. This led to an MRI of the breast 08/27/2013 which showed 3 cm mass in the lower outer quadrant of the left breast and additional clump enhancement in the middle and anterior third of the left breast at the 3 o' clock axis suspicious for in situ carcinoma and 3 rounded masses measured up to 9 mm in greatest dimension are noted consistent with lymph nodes. One ha d a clip from the recent biopsy. Right breast showed stippled foci of variable enhancement with no dominant or clumped findings, prominent right axillary lymph nodes were noted and right axillary lymph node biopsy was done. This was nondiagnostic.   The patient underwent bone scan on 09/09/2013 which was unremarkable except for some increased activity at T9 and the right part of L3 which was again felt to be nonspecific but plain films were recommended. CT scan of the chest, abdomen and pelvis was done which was also unremarkable except for prominent intramammary nodes in the upper outer left breast and several prominent axillary lymph nodes bilaterally one of which has been biopsied on the right. Appearance raised the concern of possible metastatic to both axillae but there were no other sites of involvement. Bone density dated 09/20/2013 was normal. Patient then went for surgery on 09/25/2013 at which time she underwent right mastectomy which was unremarkable and left total mastectomy with axillary dissection which showed a 2.9 cm grade 3 infiltrating ductal carcinoma with associated DCIS, margins were uninvolved. Three of 21 lymph nodes showed metastatic disease making this a pT2N1.   The patient has done well from surgery but has had a lot of prob lems with anxiety and hot flashes but otherwise has done well. She went to see   Wilian for recommendations and then came to see us for further discussion of treatment options. In addition, the patient had BRCA testing which thankfully was negative.   T he patient had a MUGA scan, which showed an ejection fraction of 50% and no other abnormalities. Her plain films of the abnormal spots on her bone scan were negative. She had port placed and is ready to start treatment. 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   When she had CT scans of the chest, abdomen and pelvis, they were unremarkable except for hemangioma in the liver and prominent intramammary nodes in the upper outer quadrant of the left breast with several prominent axillary nodes bilaterally.   The patient started dose-dense AC with fairly good tolerance except for some mucositis after cycle 4. She had a febrile illness that required hospitalization from 01/02/2014 - 01/15/2014 with a fever of unknown origin. Multiple viral cultures were negative including CMV, EBV, herpes, respiratory panel and hepatiti s profile was negative. Mouth multiple blood and urine cultures and throat cultures were negative except for Haemophilus parainfluenza for which a course of amoxicillin was given. During the hospitalization the patient spiked fevers continuously and then developed rapid elevation of her liver tests with a ferritin which went up as high as 44,000. The patient defervesced finally and the liver profile started coming down. Echo with bubble echo during her stay was normal with ejection fraction of 60%. The jose francisco shah was discharged home with plans to resume chemotherapy cautiously once her numbers stabilized.   The patient was seen on 01/22/2014 feeling much better. She still is not very active, feeling fatigued and deconditioned from lying in bed  for almost 2 we eks. The decision was made because of borderline blood pressure of 94/70 and some mild tachycardia to have her be more active and exercise for another week and reevaluate to start Herceptin on 01/30/2014 with Taxol to be added after 2 doses of Herceptin i f she is tolerating the treatment well and her liver functions are normal. A bone marrow biopsy was also done during her hospital stay and no signs of hemophagocytosis or dysplasia although there was some aberrant myeloid antigen of uncertain significanc e which has been seen with myelodysplasia. She was also seen briefly by Dr. Fatmata Tolbert of Rheumatology while in the hospital for low positive ARTEMIO of 1:160 and anti-SSA antibody greater than 8 but Dr. Tolbert felt this was unlikely to be rheumatologica l disease because 4 doses of Cytoxan should not have precipitated this.   The patient initiated weekly Herceptin for 2 weeks with no significant problems. Taxol weekly initiated on 02/13/2014 with close followup of LFTs and cardiac function.   The patient was seen on 03/06/2014 with echo showing EF of 63%. We will continue Taxol and Herceptin weekly and watch her LFTs closely. Neurontin causes dizziness and she is not taking it. We will hold off on Effexor for the time being.   Patient required Neupogen for 3 doses because of an ANC of 1.06 with dose 4 and Neupogen x3 is added for each dose.   The patient is seen 03/20/2014 doing fairly well. Taxol dose decreased to 15% because of neuropathy. Neupogen continued.   The patient was seen on 05/23/2014 doing well. Herceptin continued with plans to check an echocardiogram before her next treatment in 3 weeks.   MRI of the liver confirmed hemangioma. Echocardiogram is stable at 59%. Herceptin continued at 3-week intervals with the addition of Pepcid and Solu-Cortef 50 mg and the patient is almost certainly going to agree to radiation for her 3 node status, especially since the pathologist at [facility] thought there was extranodal extension.   The patient was seen 07/25/2014. Echocardiogram results noted, slightly reduced from previous, the patient proceeding through radiation therapy of left chest wall. Followup echocardiogram scheduled after her Herceptin therapy 07/25/2014.   Patient was seen on 08/14/2014 with EF of 53%. I discussed th e case with Dr. Bebe Fernandez and did not use the definitive contrast and this may explain the differences, but based on the fact that she had some near syncope on one occasion this weekend and the fact that she has had radiation to the left chest area with a significant skin burn, we will hold Herceptin today and bring her back in 4 weeks, but check her LFTs and ferritin again today.   The patient was 11/14/2014 doing well with ejection fraction of 63% and echocardiogram was reviewed. Herceptin will be continued until mid-January.   The patient is seen 12/29/2014. She had a syncopal episode 12/19/2014. ER workup was negative and neurological evaluation was ordered. Bone scan ordered for right hip pain. If this is negative she will have her port removed in J anuary and she is planning to delay reconstruction for a year.   The patient was evaluated for some pain in her back with a bone scan which was essentially unremarkable except for some degenerative changes at L3, and T6-7-8. Because we were concerned, we ordered an MRI of the thoracic and lumbar spine which was benign with just DJD. No further followup is needed at this time with regard to scans.   Patient seen on 05/08/2015, doing well; occasional migraine-type headache persists. Port has been removed. She is thinking about reconstruction sometime at the end of the year.   CT of the abdomen done at [facility] in 10/2014 shows a liver lesion, which was indeterminate. They recommended MRI. We have compared to a previous MRI in 2014 and established this is hemangioma.   Patient seen on  12/18/2015 having had CT scans of the chest and abdo men at [facility] for some abdominal discomfort that showed a 3 cm lesion in the dome of the liver, which they were worried about and recommended an MRI, but we reviewed the scans and also compared to the previous MRI of the liver d one in 2014 in October and confirmed that this indeed was a hemangioma that has been stable since 2013. An echo was also done which showed an ejection fraction of 60% and stable.   7/17   patient is a 51-year-old  female with an ER/OH negative HER-2 positive breast cancer node positive T2 N1 M0 treated with Adriamycin and Cytoxan followed by Taxol Herceptin was completed in late 2014 .  He is here for routine follow-up and overall she is doing well she was diagnosed with asthma and  is on an inhaler with good improvement.  She has mild hot flashes but has mood swings and menopausal symptoms as reported into menopause with chemotherapy.  She has no unusual aches or pains but otherwise is doing well and working full-time.  She is not interested in reconstruction at this point.  We did discuss doing extended genetic testing because of her family history we referred her for the extended testing but she has not done that yet She did have a screening colonoscopy and this was negative.  DEXA scan was normal in April of this year.  She is still very anxious about recurrence which is understandable but getting better with time.  7/18  0.9 g IgG kappa MGUS noted the negative skeletal survey and urine protein.  Bone marrow planned workup for recurrent syncope underway per cardiology    10/18  having intermittent episodes of syncope which sounds vasovagal the last one was in May when she was at a bar drinking with friends .    she's had an echo and a stress test and an EEG andbrain MRI  which was reportedly benign.  She is currently wearing a event monitor During the workup of these syncopal episodes the  neurologist ordered an immunoelectrophoresis which showed an IgG paraprotein of 0.8 g/dL.  Need to evaluate this further but I don't think this is anything to do with her syncope unless she has an autonomic neuropathy but she was sitting down at the time she felt nausea  and had her syncopal episode that does not appear to be orthostatic  We reviewed her skeletal survey which is essentially negative her urine did not show any significant Bence Witt protein she has a 0.9 g/dL M protein with a mildly elevated light chain ratio and a mildly elevated beta-2 microglobulin of 2.5      I recommended a bone marrow biopsy and staining for amyloid and this was done and her bone marrow showed 5% plasma cells which are monotypic and have a 13q minus but no amyloid deposition-I explained that she has an MGUS but can proceed to myeloma over time especially because she has an abnormal free light chain ratio and we will watch her M protein closely  I'm not sure this explains her syncope        SOCIAL HISTORY: Single. . She does not smoke. Drinks very little. No drug history.     FAMILY HISTORY: Both of her parents are in their 60s. Mother had kidney cancer and had a partial kidney resection. Brother  of head trauma, another is healthy. She has two sisters who are healthy. She has 2 paternal aunts with breast cancer; one is alive in her 70s, the other  at 48 of breast cancer. A paternal uncle with kidney cancer. Paternal grandfather with colon cancer in his 60s. Multiple paternal cousins with cancer inc luding leukemia and kidney cancer. No other history of breast or ovarian cancer in the family.     Review of Systems   Constitutional: Positive for fatigue (better 19).   Respiratory: Positive for chest tightness (breast and chest arms tenderness 19) and shortness of breath (asthma same 19).    Gastrointestinal: Negative for constipation, diarrhea, nausea and vomiting.   Musculoskeletal:  "Positive for arthralgias (worse 8/23/19), back pain (same 8/23/19) and gait problem (left hip bone on bone worse 8/23/19).   Skin: Positive for rash (under left arm 8/23/19).   Allergic/Immunologic: Positive for environmental allergies (same 8/23/19).   Neurological: Positive for numbness (same 8/23/19).   Psychiatric/Behavioral: The patient is nervous/anxious (little better 8/23/19).       A comprehensive 14 point review of systems was performed and was negative except as mentioned.    Medications:  The current medication list was reviewed in the EMR    ALLERGIES:    Allergies   Allergen Reactions   • Ciprofloxacin Hives   • Sulfa Antibiotics Hives   • Amoxicillin Nausea And Vomiting       Objective      Vitals:    08/23/19 1154   BP: 128/78   Pulse: 70   Resp: 16   Temp: 98.5 °F (36.9 °C)   SpO2: 98%   Weight: 76.7 kg (169 lb 1.6 oz)   Height: 161 cm (63.39\")   PainSc: 0-No pain     Current Status 8/23/2019   ECOG score 0       Physical Exam    GENERAL:  Well-developed, well-nourished in no acute distress.   SKIN:  Warm, dry without rashes, purpura or petechiae.  Fading rash in both axilla likely heat rash  HEAD:  Normocephalic.  EYES:  Pupils equal, round and reactive to light.  EOMs intact.  Conjunctivae normal.  EARS:  Hearing intact.  NOSE:  Septum midline.  No excoriations or nasal discharge.  MOUTH:  Tongue is well-papillated; no stomatitis or ulcers.  Lips normal.  THROAT:  Oropharynx without lesions or exudates.  NECK:  Supple with good range of motion; no thyromegaly or masses, no JVD.  LYMPHATICS:  No cervical, supraclavicular, axillary or inguinal adenopathy.  CHEST:  Lungs clear to percussion and auscultation. Good airflow.  BREASTS: Bilateral chest wall is benign with no axillary adenopathy-CARDIAC:  Regular rate and rhythm without murmurs, rubs or gallops. Normal S1,S2.  ABDOMEN:  Soft, nontender with no organomegaly or masses.  EXTREMITIES:  No clubbing, cyanosis or edema.  NEUROLOGICAL:  Cranial " Nerves II-XII grossly intact.  No focal neurological deficits.  PSYCHIATRIC:  Normal affect and mood.        RECENT LABS:  Hematology WBC   Date Value Ref Range Status   08/16/2019 4.00 3.40 - 10.80 10*3/mm3 Final     RBC   Date Value Ref Range Status   08/16/2019 3.99 3.77 - 5.28 10*6/mm3 Final     Hemoglobin   Date Value Ref Range Status   08/16/2019 11.4 (L) 12.0 - 15.9 g/dL Final     Hematocrit   Date Value Ref Range Status   08/16/2019 35.9 34.0 - 46.6 % Final     Platelets   Date Value Ref Range Status   08/16/2019 209 140 - 450 10*3/mm3 Final   2D echo and stress echo normal in 5 /18       BONE SURVEY  This report was finalized on 6/25/2018 A standard bone survey was obtained. No discrete lytic or blastic  lesions are identified. There are moderately extensive degenerative disc  changes at the C4-C5 and C5-C6 and C6-C7 levels and at multiple levels  throughout the thoracic spine. No fractures are identified.     IMPRESSIONS: Unremarkable bone survey except for degenerative disc  changes as noted.     This report was finalized on 6/25/2018               Assessment/Plan   1.  T2 N1 ER/CO negative HER-2 positive left breast cancer 2013 treated with Adriamycin Cytoxan Taxol Herceptin and bilateral mastectomies plus radiation 4.5 Years from diagnosis  2.  BRCA negative  3.  Hemangioma of the liver  4.  family history of malignancy  5.  Mild chronic anemia  6.  Anxiety intolerant of Effexor ?  Zyprexa 2.5 mg   7. Recurrent syncope without obvious precipitating cause workup underway  8.  Incidental finding of a monoclonal gammopathy IgG kappa skeletal survey negative bone marrow showing 5% plasma cells with no amyloid      Plan  1.return for myeloma parameters in 3 months and 6 months  2.  see me in 6 months   3.  Check iron B12 and folate in 3 months   .          8/23/2019      CC:

## 2019-08-28 ENCOUNTER — TELEPHONE (OUTPATIENT)
Dept: ONCOLOGY | Facility: HOSPITAL | Age: 54
End: 2019-08-28

## 2019-08-28 NOTE — TELEPHONE ENCOUNTER
----- Message from Kristin Sifuentes sent at 8/27/2019  4:30 PM EDT -----  Contact: [phone]  Pt has questions about her given diagnosis.  My chart states she is ER positive. Clearly states in Dr Stinson notes that is ER negative. Patient verbalized understanding.

## 2019-09-11 ENCOUNTER — TELEPHONE (OUTPATIENT)
Dept: ONCOLOGY | Facility: HOSPITAL | Age: 54
End: 2019-09-11

## 2019-09-11 NOTE — TELEPHONE ENCOUNTER
Per Dr. Stinson, informed pt her remission date is the date of her last treatment which was in January 2015. Pt v/u. She states she needs a letter on MD letter head stating her current health status. Pt is trying to obtain a tempur haley levine from [employer] and needs to  letter for this. In basket message sent to EMELI Seo in regards to this.     ----- Message from Kristin Sifuentes sent at 9/11/2019 12:15 PM EDT -----  Contact: [phone]  Pt asking for remission date for cancer

## 2019-09-13 ENCOUNTER — DOCUMENTATION (OUTPATIENT)
Dept: ONCOLOGY | Facility: CLINIC | Age: 54
End: 2019-09-13

## 2019-09-17 ENCOUNTER — TELEPHONE (OUTPATIENT)
Dept: ORTHOPEDIC SURGERY | Facility: CLINIC | Age: 54
End: 2019-09-17

## 2019-09-17 DIAGNOSIS — M17.12 ARTHRITIS OF LEFT KNEE: ICD-10-CM

## 2019-09-17 DIAGNOSIS — M16.12 ARTHRITIS OF LEFT HIP: Primary | ICD-10-CM

## 2019-09-17 RX ORDER — METHOCARBAMOL 500 MG/1
500 TABLET, FILM COATED ORAL 4 TIMES DAILY
Qty: 45 TABLET | Refills: 3 | Status: SHIPPED | OUTPATIENT
Start: 2019-09-17 | End: 2020-08-20

## 2019-09-17 NOTE — TELEPHONE ENCOUNTER
Called and discussed options she is getting over breast CA surgery and chemo right now and is not able to consider a hip replacement.  Her last injection gave her good relief until now and was not hurting very much when she followed up with POLY on 8/22 but worse since then. Will place order for left hip injection to buy time so she can heal. Will also send in muscle relaxer for bedtime to help with spasms.      I will have Josy call her and set up f/u appt with POLY beginning of December with new L hip xr to see how she is doing.

## 2019-09-17 NOTE — TELEPHONE ENCOUNTER
Notified patient of SPM's availability the 1st week of December 2019 & per patient's request, scheduled patient for FU / LEFT HIP / Needs New Left HIP XR / to see SPM per MJR on Mon 12/02/19 @ 8:10 AM. Patient mentioned she was to be going out of town beginning Thurs 12/05/19.

## 2019-10-02 ENCOUNTER — HOSPITAL ENCOUNTER (OUTPATIENT)
Dept: GENERAL RADIOLOGY | Facility: HOSPITAL | Age: 54
Discharge: HOME | End: 2019-10-02
Admitting: NURSE PRACTITIONER

## 2019-10-02 DIAGNOSIS — M16.12 ARTHRITIS OF LEFT HIP: ICD-10-CM

## 2019-10-02 PROCEDURE — 25010000002 IOPAMIDOL 61 % SOLUTION: Performed by: RADIOLOGY

## 2019-10-02 PROCEDURE — 25010000003 LIDOCAINE 1 % SOLUTION: Performed by: RADIOLOGY

## 2019-10-02 PROCEDURE — 77002 NEEDLE LOCALIZATION BY XRAY: CPT

## 2019-10-02 PROCEDURE — 25010000002 METHYLPREDNISOLONE PER 125 MG: Performed by: RADIOLOGY

## 2019-10-02 RX ORDER — BUPIVACAINE HYDROCHLORIDE 2.5 MG/ML
10 INJECTION, SOLUTION EPIDURAL; INFILTRATION; INTRACAUDAL ONCE
Status: COMPLETED | OUTPATIENT
Start: 2019-10-02 | End: 2019-10-02

## 2019-10-02 RX ORDER — METHYLPREDNISOLONE SODIUM SUCCINATE 125 MG/2ML
80 INJECTION, POWDER, LYOPHILIZED, FOR SOLUTION INTRAMUSCULAR; INTRAVENOUS
Status: COMPLETED | OUTPATIENT
Start: 2019-10-02 | End: 2019-10-02

## 2019-10-02 RX ORDER — LIDOCAINE HYDROCHLORIDE 10 MG/ML
10 INJECTION, SOLUTION INFILTRATION; PERINEURAL ONCE
Status: COMPLETED | OUTPATIENT
Start: 2019-10-02 | End: 2019-10-02

## 2019-10-02 RX ADMIN — BUPIVACAINE HYDROCHLORIDE 5 ML: 2.5 INJECTION, SOLUTION EPIDURAL; INFILTRATION; INTRACAUDAL; PERINEURAL at 10:35

## 2019-10-02 RX ADMIN — METHYLPREDNISOLONE SODIUM SUCCINATE 80 MG: 125 INJECTION, POWDER, LYOPHILIZED, FOR SOLUTION INTRAMUSCULAR; INTRAVENOUS at 10:35

## 2019-10-02 RX ADMIN — IOPAMIDOL 2 ML: 612 INJECTION, SOLUTION INTRAVENOUS at 10:35

## 2019-10-02 RX ADMIN — LIDOCAINE HYDROCHLORIDE 2 ML: 10 INJECTION, SOLUTION INFILTRATION; PERINEURAL at 10:35

## 2019-10-04 ENCOUNTER — TELEPHONE (OUTPATIENT)
Dept: ORTHOPEDIC SURGERY | Facility: CLINIC | Age: 54
End: 2019-10-04

## 2019-10-04 NOTE — TELEPHONE ENCOUNTER
Enc to restart exercises now that the shot in the hip is helping.  Discussed pros and cons of PRP.  Enc to do exercises and let us know what she needs.

## 2019-10-28 ENCOUNTER — TELEPHONE (OUTPATIENT)
Dept: ORTHOPEDIC SURGERY | Facility: CLINIC | Age: 54
End: 2019-10-28

## 2019-10-28 NOTE — TELEPHONE ENCOUNTER
Left answering machine message informing patient x-ray cd ready for  at , [facility] office.

## 2019-10-30 DIAGNOSIS — Z17.0 MALIGNANT NEOPLASM OF UPPER-OUTER QUADRANT OF BOTH BREASTS IN FEMALE, ESTROGEN RECEPTOR POSITIVE (HCC): Primary | ICD-10-CM

## 2019-10-30 DIAGNOSIS — C50.411 MALIGNANT NEOPLASM OF UPPER-OUTER QUADRANT OF BOTH BREASTS IN FEMALE, ESTROGEN RECEPTOR POSITIVE (HCC): Primary | ICD-10-CM

## 2019-10-30 DIAGNOSIS — C50.412 MALIGNANT NEOPLASM OF UPPER-OUTER QUADRANT OF BOTH BREASTS IN FEMALE, ESTROGEN RECEPTOR POSITIVE (HCC): Primary | ICD-10-CM

## 2019-11-15 ENCOUNTER — LAB (OUTPATIENT)
Dept: LAB | Facility: HOSPITAL | Age: 54
End: 2019-11-15

## 2019-11-15 ENCOUNTER — CLINICAL SUPPORT (OUTPATIENT)
Dept: ONCOLOGY | Facility: HOSPITAL | Age: 54
End: 2019-11-15

## 2019-11-15 DIAGNOSIS — Z17.0 MALIGNANT NEOPLASM OF UPPER-OUTER QUADRANT OF BOTH BREASTS IN FEMALE, ESTROGEN RECEPTOR POSITIVE (HCC): ICD-10-CM

## 2019-11-15 DIAGNOSIS — D47.2 MGUS (MONOCLONAL GAMMOPATHY OF UNKNOWN SIGNIFICANCE): ICD-10-CM

## 2019-11-15 DIAGNOSIS — C50.411 MALIGNANT NEOPLASM OF UPPER-OUTER QUADRANT OF BOTH BREASTS IN FEMALE, ESTROGEN RECEPTOR POSITIVE (HCC): ICD-10-CM

## 2019-11-15 DIAGNOSIS — C50.412 MALIGNANT NEOPLASM OF UPPER-OUTER QUADRANT OF BOTH BREASTS IN FEMALE, ESTROGEN RECEPTOR POSITIVE (HCC): ICD-10-CM

## 2019-11-15 LAB
BASOPHILS # BLD AUTO: 0.02 10*3/MM3 (ref 0–0.2)
BASOPHILS NFR BLD AUTO: 0.4 % (ref 0–1.5)
DEPRECATED RDW RBC AUTO: 44.4 FL (ref 37–54)
EOSINOPHIL # BLD AUTO: 0.07 10*3/MM3 (ref 0–0.4)
EOSINOPHIL NFR BLD AUTO: 1.5 % (ref 0.3–6.2)
ERYTHROCYTE [DISTWIDTH] IN BLOOD BY AUTOMATED COUNT: 13.7 % (ref 12.3–15.4)
FERRITIN SERPL-MCNC: 173.7 NG/ML (ref 11–207)
HCT VFR BLD AUTO: 34.6 % (ref 34–46.6)
HGB BLD-MCNC: 11.2 G/DL (ref 12–15.9)
IMM GRANULOCYTES # BLD AUTO: 0.01 10*3/MM3 (ref 0–0.05)
IMM GRANULOCYTES NFR BLD AUTO: 0.2 % (ref 0–0.5)
IRON 24H UR-MRATE: 73 MCG/DL (ref 37–145)
IRON SATN MFR SERPL: 17 % (ref 14–48)
LYMPHOCYTES # BLD AUTO: 1.49 10*3/MM3 (ref 0.7–3.1)
LYMPHOCYTES NFR BLD AUTO: 32 % (ref 19.6–45.3)
MCH RBC QN AUTO: 29 PG (ref 26.6–33)
MCHC RBC AUTO-ENTMCNC: 32.4 G/DL (ref 31.5–35.7)
MCV RBC AUTO: 89.6 FL (ref 79–97)
MONOCYTES # BLD AUTO: 0.69 10*3/MM3 (ref 0.1–0.9)
MONOCYTES NFR BLD AUTO: 14.8 % (ref 5–12)
NEUTROPHILS # BLD AUTO: 2.37 10*3/MM3 (ref 1.7–7)
NEUTROPHILS NFR BLD AUTO: 51.1 % (ref 42.7–76)
NRBC BLD AUTO-RTO: 0 /100 WBC (ref 0–0.2)
PLATELET # BLD AUTO: 253 10*3/MM3 (ref 140–450)
PMV BLD AUTO: 8.4 FL (ref 6–12)
RBC # BLD AUTO: 3.86 10*6/MM3 (ref 3.77–5.28)
TIBC SERPL-MCNC: 417 MCG/DL (ref 249–505)
TRANSFERRIN SERPL-MCNC: 298 MG/DL (ref 200–360)
WBC NRBC COR # BLD: 4.65 10*3/MM3 (ref 3.4–10.8)

## 2019-11-15 PROCEDURE — 36415 COLL VENOUS BLD VENIPUNCTURE: CPT

## 2019-11-15 PROCEDURE — 83540 ASSAY OF IRON: CPT

## 2019-11-15 PROCEDURE — 85025 COMPLETE CBC W/AUTO DIFF WBC: CPT

## 2019-11-15 PROCEDURE — 82728 ASSAY OF FERRITIN: CPT

## 2019-11-15 PROCEDURE — 84466 ASSAY OF TRANSFERRIN: CPT

## 2019-11-15 NOTE — PROGRESS NOTES
Lab Results   Component Value Date    WBC 4.65 11/15/2019    HGB 11.2 (L) 11/15/2019    HCT 34.6 11/15/2019    MCV 89.6 11/15/2019     11/15/2019     CBC RESULTS R/W PT. HGB NOTED AT 11.2. PT C/O ONGOING FATIGUE. PT IN PAIN AND IS SCHEDULED TO GET HIP REPLACEMENT DEC 11TH. PT TO CHECK MYCHART OR CALL TRIAGE FOR FERRITIN, IRON PANEL AND MM LABS. PT GIVEN COPY OF CBC.

## 2019-11-18 LAB
ALBUMIN SERPL-MCNC: 3.7 G/DL (ref 2.9–4.4)
ALBUMIN/GLOB SERPL: 1.1 {RATIO} (ref 0.7–1.7)
ALPHA1 GLOB FLD ELPH-MCNC: 0.2 G/DL (ref 0–0.4)
ALPHA2 GLOB SERPL ELPH-MCNC: 0.6 G/DL (ref 0.4–1)
B-GLOBULIN SERPL ELPH-MCNC: 1 G/DL (ref 0.7–1.3)
GAMMA GLOB SERPL ELPH-MCNC: 1.6 G/DL (ref 0.4–1.8)
GLOBULIN SER CALC-MCNC: 3.4 G/DL (ref 2.2–3.9)
IGA SERPL-MCNC: 153 MG/DL (ref 87–352)
IGG SERPL-MCNC: 1791 MG/DL (ref 700–1600)
IGM SERPL-MCNC: 48 MG/DL (ref 26–217)
INTERPRETATION SERPL IEP-IMP: ABNORMAL
KAPPA LC SERPL-MCNC: 63.3 MG/L (ref 3.3–19.4)
KAPPA LC/LAMBDA SER: 5.7 {RATIO} (ref 0.26–1.65)
LAMBDA LC FREE SERPL-MCNC: 11.1 MG/L (ref 5.7–26.3)
Lab: ABNORMAL
M-SPIKE: 0.9 G/DL
PROT SERPL-MCNC: 7.1 G/DL (ref 6–8.5)

## 2020-02-14 ENCOUNTER — LAB (OUTPATIENT)
Dept: LAB | Facility: HOSPITAL | Age: 55
End: 2020-02-14

## 2020-02-14 DIAGNOSIS — D47.2 MGUS (MONOCLONAL GAMMOPATHY OF UNKNOWN SIGNIFICANCE): ICD-10-CM

## 2020-02-14 DIAGNOSIS — C50.412 MALIGNANT NEOPLASM OF UPPER-OUTER QUADRANT OF BOTH BREASTS IN FEMALE, ESTROGEN RECEPTOR POSITIVE (HCC): ICD-10-CM

## 2020-02-14 DIAGNOSIS — Z17.0 MALIGNANT NEOPLASM OF UPPER-OUTER QUADRANT OF BOTH BREASTS IN FEMALE, ESTROGEN RECEPTOR POSITIVE (HCC): ICD-10-CM

## 2020-02-14 DIAGNOSIS — C50.411 MALIGNANT NEOPLASM OF UPPER-OUTER QUADRANT OF BOTH BREASTS IN FEMALE, ESTROGEN RECEPTOR POSITIVE (HCC): ICD-10-CM

## 2020-02-14 LAB
ALBUMIN SERPL-MCNC: 4.3 G/DL (ref 3.5–5.2)
ALBUMIN/GLOB SERPL: 1.4 G/DL (ref 1.1–2.4)
ALP SERPL-CCNC: 71 U/L (ref 38–116)
ALT SERPL W P-5'-P-CCNC: 15 U/L (ref 0–33)
ANION GAP SERPL CALCULATED.3IONS-SCNC: 11.6 MMOL/L (ref 5–15)
AST SERPL-CCNC: 20 U/L (ref 0–32)
BASOPHILS # BLD AUTO: 0.02 10*3/MM3 (ref 0–0.2)
BASOPHILS NFR BLD AUTO: 0.4 % (ref 0–1.5)
BILIRUB SERPL-MCNC: 0.4 MG/DL (ref 0.2–1.2)
BUN BLD-MCNC: 22 MG/DL (ref 6–20)
BUN/CREAT SERPL: 26.8 (ref 7.3–30)
CALCIUM SPEC-SCNC: 9.1 MG/DL (ref 8.5–10.2)
CHLORIDE SERPL-SCNC: 103 MMOL/L (ref 98–107)
CO2 SERPL-SCNC: 25.4 MMOL/L (ref 22–29)
CREAT BLD-MCNC: 0.82 MG/DL (ref 0.6–1.1)
DEPRECATED RDW RBC AUTO: 44.9 FL (ref 37–54)
EOSINOPHIL # BLD AUTO: 0.09 10*3/MM3 (ref 0–0.4)
EOSINOPHIL NFR BLD AUTO: 2 % (ref 0.3–6.2)
ERYTHROCYTE [DISTWIDTH] IN BLOOD BY AUTOMATED COUNT: 13.8 % (ref 12.3–15.4)
GFR SERPL CREATININE-BSD FRML MDRD: 88 ML/MIN/1.73
GLOBULIN UR ELPH-MCNC: 3.1 GM/DL (ref 1.8–3.5)
GLUCOSE BLD-MCNC: 95 MG/DL (ref 74–124)
HCT VFR BLD AUTO: 35.1 % (ref 34–46.6)
HGB BLD-MCNC: 11.3 G/DL (ref 12–15.9)
IMM GRANULOCYTES # BLD AUTO: 0 10*3/MM3 (ref 0–0.05)
IMM GRANULOCYTES NFR BLD AUTO: 0 % (ref 0–0.5)
LYMPHOCYTES # BLD AUTO: 1.53 10*3/MM3 (ref 0.7–3.1)
LYMPHOCYTES NFR BLD AUTO: 34.1 % (ref 19.6–45.3)
MCH RBC QN AUTO: 28.5 PG (ref 26.6–33)
MCHC RBC AUTO-ENTMCNC: 32.2 G/DL (ref 31.5–35.7)
MCV RBC AUTO: 88.6 FL (ref 79–97)
MONOCYTES # BLD AUTO: 0.62 10*3/MM3 (ref 0.1–0.9)
MONOCYTES NFR BLD AUTO: 13.8 % (ref 5–12)
NEUTROPHILS # BLD AUTO: 2.23 10*3/MM3 (ref 1.7–7)
NEUTROPHILS NFR BLD AUTO: 49.7 % (ref 42.7–76)
NRBC BLD AUTO-RTO: 0 /100 WBC (ref 0–0.2)
PLATELET # BLD AUTO: 225 10*3/MM3 (ref 140–450)
PMV BLD AUTO: 9.4 FL (ref 6–12)
POTASSIUM BLD-SCNC: 3.9 MMOL/L (ref 3.5–4.7)
PROT SERPL-MCNC: 7.4 G/DL (ref 6.3–8)
RBC # BLD AUTO: 3.96 10*6/MM3 (ref 3.77–5.28)
SODIUM BLD-SCNC: 140 MMOL/L (ref 134–145)
WBC NRBC COR # BLD: 4.49 10*3/MM3 (ref 3.4–10.8)

## 2020-02-14 PROCEDURE — 85025 COMPLETE CBC W/AUTO DIFF WBC: CPT

## 2020-02-14 PROCEDURE — 80053 COMPREHEN METABOLIC PANEL: CPT

## 2020-02-14 PROCEDURE — 36415 COLL VENOUS BLD VENIPUNCTURE: CPT

## 2020-02-17 LAB
ALBUMIN SERPL-MCNC: 3.8 G/DL (ref 2.9–4.4)
ALBUMIN/GLOB SERPL: 1.2 {RATIO} (ref 0.7–1.7)
ALPHA1 GLOB FLD ELPH-MCNC: 0.2 G/DL (ref 0–0.4)
ALPHA2 GLOB SERPL ELPH-MCNC: 0.6 G/DL (ref 0.4–1)
B-GLOBULIN SERPL ELPH-MCNC: 1 G/DL (ref 0.7–1.3)
GAMMA GLOB SERPL ELPH-MCNC: 1.3 G/DL (ref 0.4–1.8)
GLOBULIN SER CALC-MCNC: 3.2 G/DL (ref 2.2–3.9)
IGA SERPL-MCNC: 134 MG/DL (ref 87–352)
IGG SERPL-MCNC: 1711 MG/DL (ref 700–1600)
IGM SERPL-MCNC: 38 MG/DL (ref 26–217)
INTERPRETATION SERPL IEP-IMP: ABNORMAL
KAPPA LC SERPL-MCNC: 58.2 MG/L (ref 3.3–19.4)
KAPPA LC/LAMBDA SER: 7.46 {RATIO} (ref 0.26–1.65)
LAMBDA LC FREE SERPL-MCNC: 7.8 MG/L (ref 5.7–26.3)
Lab: ABNORMAL
M-SPIKE: 1 G/DL
PROT SERPL-MCNC: 7 G/DL (ref 6–8.5)

## 2020-02-21 ENCOUNTER — APPOINTMENT (OUTPATIENT)
Dept: LAB | Facility: HOSPITAL | Age: 55
End: 2020-02-21

## 2020-02-21 ENCOUNTER — OFFICE VISIT (OUTPATIENT)
Dept: ONCOLOGY | Facility: CLINIC | Age: 55
End: 2020-02-21

## 2020-02-21 VITALS
TEMPERATURE: 97.8 F | OXYGEN SATURATION: 98 % | BODY MASS INDEX: 29.23 KG/M2 | DIASTOLIC BLOOD PRESSURE: 100 MMHG | WEIGHT: 165 LBS | HEIGHT: 63 IN | HEART RATE: 64 BPM | RESPIRATION RATE: 16 BRPM | SYSTOLIC BLOOD PRESSURE: 152 MMHG

## 2020-02-21 DIAGNOSIS — Z17.0 MALIGNANT NEOPLASM OF UPPER-OUTER QUADRANT OF BOTH BREASTS IN FEMALE, ESTROGEN RECEPTOR POSITIVE (HCC): Primary | ICD-10-CM

## 2020-02-21 DIAGNOSIS — C50.412 MALIGNANT NEOPLASM OF UPPER-OUTER QUADRANT OF BOTH BREASTS IN FEMALE, ESTROGEN RECEPTOR POSITIVE (HCC): Primary | ICD-10-CM

## 2020-02-21 DIAGNOSIS — D47.2 MGUS (MONOCLONAL GAMMOPATHY OF UNKNOWN SIGNIFICANCE): ICD-10-CM

## 2020-02-21 DIAGNOSIS — C50.411 MALIGNANT NEOPLASM OF UPPER-OUTER QUADRANT OF BOTH BREASTS IN FEMALE, ESTROGEN RECEPTOR POSITIVE (HCC): Primary | ICD-10-CM

## 2020-02-21 PROCEDURE — 99214 OFFICE O/P EST MOD 30 MIN: CPT | Performed by: INTERNAL MEDICINE

## 2020-02-21 RX ORDER — MONTELUKAST SODIUM 10 MG/1
TABLET ORAL
COMMUNITY
Start: 2019-10-25 | End: 2021-06-17

## 2020-02-21 NOTE — PROGRESS NOTES
Subjective      REASONS FOR FOLLOWUP:   1. T2N1M0, ER/VA negative, HER2 positive breast cancer.   2. BRCA 1 and 2 negative.   3. Adriamycin/Cytoxan followed by Taxol and Herceptin, started on 11/08/2013.   4. Syncopal episode after cycle #3 with stable echo, chemotherapy continued.   5. FUO after cycle 4 of chemotherapy associated with severe mouth pain, marked elevation of liver function tests and ferritin to 44,000, stable echocardiogram, stable cardiac function, ?etiology, and query drug reaction versus viral infection.   6. Taxol weekly initiated on 02/13/2014. Close followup of LFTs and cardiac function.   7. Echocardiogram 03/05/2014 stable at 63%. Treatment continued with Taxol and Herceptin.   8. Neuropathy grade 2. Taxol decreased 15% on 03/20/2014.   9. Q.3 week Herceptin started 05/23/2014 with plans for radiation consult.   10. Itching with Herceptin. Hydrocortisone and Pepcid added to care plan.   11. Patient seen 07/25/2014 with mild reduction in ejection fraction noted. Fo llowup echocardiogram scheduled prior to next visit month, Herceptin continued with hydrocortisone and Pepcid pre-medications.   12. Further drop in the ejection fraction to 53%. Because of some dizziness and near syncope, treatment with Herceptin held with planned followup in 1 month with repeat echocardiogram. MRI done for headaches was negative.   13. EF up to 64%. Herceptin resumed. Previous echocardiogram felt to be suboptimal due to lack of contrast.   14. Syncopal episode 12/19/2014 with a negative cardiac evaluation. Neurology consult recommended.   15. MRI done for headaches. Negative in August 2014.   16. CT of the abdomen done at [facility] in 10/2014 shows a liver lesion, which was indeterminate. They recommended MRI. We have compared to a previous MRI in 2014 and established this is hemangioma.                     17. MGUS Recurrent syncope workup found incidental IgG kappa                               monoclonal  gammopathy  Workup negative negative         History of Present Illness patient is a 52-year-old lady with a node positive HER-2 positive ER negative breast cancer 5 years from completion of adjuvant chemotherapy.      She comes in today for follow-up and overall she is doing well but   She also has an incidentally found IgG MGUS which we are following and appears to been fairly stable now for the last 18 months with no bone marrow evidence of myeloma or excess plasma cells.  Her M protein is 1.0 g which is the same as it was last year and her free light chain ratio is abnormal but stable    She has no new complaints but is recently started dating and is having a lot of issues with vaginal dryness and the gynecologist recommended oral estrogens but I told her to stick to vaginal estrogens on this is less systemic absorption and may be able to help her symptoms.  Although her tumor was ER [place] positive I still do not feel comfortable giving her full dose replacement estrogen unless low-dose vaginal estrogen does not help and she is miserable    She remains very anxious about recurrence of her breast cancer and I told her 5 years out from an ER [place] negative HER-2 positive breast cancer would be a good time point and she is almost certainly cured and since she has had bilateral mastectomies new cancers not at all likely    Her CBC stable except for mild anemia and she has had this in the past and we will keep an eye on it and check parameters in 3 months      Active Ambulatory Problems     Diagnosis Date Noted   • Bilateral malignant neoplasm of upper outer quadrant of breast in female (CMS/[facility]) 04/15/2016   • Chronic pain of left knee 01/11/2018   • Acute pain of left knee 01/11/2018   • Contusion of left knee 01/11/2018   • Arthritis of left knee 02/09/2018   • Chondromalacia, patella, left 02/09/2018   • MGUS (monoclonal gammopathy of unknown significance) 06/08/2018   • Arthritis of left hip  2019   • Left hip pain 2019     Resolved Ambulatory Problems     Diagnosis Date Noted   • No Resolved Ambulatory Problems     Past Medical History:   Diagnosis Date   • Acquired hypothyroidism    • Anemia    • Arthritis    • Asthma    • Cancer (CMS/HCC)    • H/O Chondromalacia patellae, left    • H/O Regurgitation, mitral/triscupid, mild    • H/O Syncope w/collapse    • Heart murmur    • History of chronic fatigue      SURGICAL HISTORY: Fibroadenoma from the left breast in . Uterine fibroids removed laparoscopically in . Left knee arthroscopy in  and hysterectomy in . Ovaries were left in place.     GYN HISTORY: Menarche age 13.  0. She obviously stopped menstruating at the time of her hysterectomy in  and is having hot flashes.     HEMATOLOGIC/ONCOLOGIC HISTORY:    The  patient initially seen on 10/18/2013, a 48-year-old  female who works as a  at the [employer] ' s office who has a long history of fibrocystic breast disease with multiple ultrasounds and mammograms in the past to evaluate c ysts but after her most recent mammogram, because of some abnormalities, an ultrasound was recommended on 2013 at [facility]. The targeted ultrasound showed 3 circumscribed solid masses in the left breast at the 2 o' clock position, axillar y tail of the breast. The appearance was suggestive of intramammary lymph nodes without a definite fatty hilum which was worrisome for tumor involvement. One of the 3 masses had actually increased in size compared to 2 of the other lesions which were un changed from 2010 suggesting benign etiology. Because of the greater than 20% interval increase in size of one of these nodules since the last mammogram, ultrasound guided biopsy was recommended.   Ultrasound-guided biopsy was done on 2013 and this biopsy showed findings highly suspicious for lymph node involvement by ductal carcinoma and the  patient then went to see Dr. Witt who sent her for another biopsy on 08/19/2013 and a 1.5 cm mass was seen at the 3 o' clock position of the left breast in luan tion to the 3 abnormal axillary masses which were felt to be lymph nodes. This area was biopsied on 08/19/2013 and the path report showed invasive ductal carcinoma and DCIS grade 3 with the largest focus of invasive cancer being 1 cm and DCIS was interme d iate to high grade. The left axillary lymph node was core biopsied and confirmed metastatic carcinoma. ER/MO were negative, HER2 was 3+ positive. This led to an MRI of the breast 08/27/2013 which showed 3 cm mass in the lower outer quadrant of the left breast and additional clump enhancement in the middle and anterior third of the left breast at the 3 o' clock axis suspicious for in situ carcinoma and 3 rounded masses measured up to 9 mm in greatest dimension are noted consistent with lymph nodes. One ha d a clip from the recent biopsy. Right breast showed stippled foci of variable enhancement with no dominant or clumped findings, prominent right axillary lymph nodes were noted and right axillary lymph node biopsy was done. This was nondiagnostic.   The patient underwent bone scan on 09/09/2013 which was unremarkable except for some increased activity at T9 and the right part of L3 which was again felt to be nonspecific but plain films were recommended. CT scan of the chest, abdomen and pelvis was done which was also unremarkable except for prominent intramammary nodes in the upper outer left breast and several prominent axillary lymph nodes bilaterally one of which has been biopsied on the right. Appearance raised the concern of possible metastatic to both axillae but there were no other sites of involvement. Bone density dated 09/20/2013 was normal. Patient then went for surgery on 09/25/2013 at which time she underwent right mastectomy which was unremarkable and left total mastectomy with axillary  dissection which showed a 2.9 cm grade 3 infiltrating ductal carcinoma with associated DCIS, margins were uninvolved. Three of 21 lymph nodes showed metastatic disease making this a pT2N1.   The patient has done well from surgery but has had a lot of prob lems with anxiety and hot flashes but otherwise has done well. She went to see Dr. Cedeno for recommendations and then came to see us for further discussion of treatment options. In addition, the patient had BRCA testing which thankfully was negative.   T he patient had a MUGA scan, which showed an ejection fraction of 50% and no other abnormalities. Her plain films of the abnormal spots on her bone scan were negative. She had port placed and is ready to start treatment. 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   When she had CT scans of the chest, abdomen and pelvis, they were unremarkable except for hemangioma in the liver and prominent intramammary nodes in the upper outer quadrant of the left breast with several prominent axillary nodes bilaterally.   The patient started dose-dense AC with fairly good tolerance except for some mucositis after cycle 4. She had a febrile illness that required hospitalization from 01/02/2014 - 01/15/2014 with a fever of unknown origin. Multiple viral cultures were negative including CMV, EBV, herpes, respiratory panel and hepatiti s profile was negative. Mouth multiple blood and urine cultures and throat cultures were negative except for Haemophilus parainfluenza for which a course of amoxicillin was given. During the hospitalization the patient spiked fevers continuously and then developed rapid elevation of her liver tests with a ferritin which went up as high as 44,000. The patient defervesced finally and the liver profile started  coming down. Echo with bubble echo during her stay was normal with ejection fraction of 60%. The p pablo was discharged home with plans to resume chemotherapy cautiously once her numbers stabilized.   The patient was seen on 01/22/2014 feeling much better. She still is not very active, feeling fatigued and deconditioned from lying in bed for almost 2 we eks. The decision was made because of borderline blood pressure of 94/70 and some mild tachycardia to have her be more active and exercise for another week and reevaluate to start Herceptin on 01/30/2014 with Taxol to be added after 2 doses of Herceptin i f she is tolerating the treatment well and her liver functions are normal. A bone marrow biopsy was also done during her hospital stay and no signs of hemophagocytosis or dysplasia although there was some aberrant myeloid antigen of uncertain significanc e which has been seen with myelodysplasia. She was also seen briefly by Dr. Fatmata Tolbert of Rheumatology while in the hospital for low positive ARTEMIO of 1:160 and anti-SSA antibody greater than 8 but Dr. Tolbert felt this was unlikely to be rheumatologica l disease because 4 doses of Cytoxan should not have precipitated this.   The patient initiated weekly Herceptin for 2 weeks with no significant problems. Taxol weekly initiated on 02/13/2014 with close followup of LFTs and cardiac function.   The patient was seen on 03/06/2014 with echo showing EF of 63%. We will continue Taxol and Herceptin weekly and watch her LFTs closely. Neurontin causes dizziness and she is not taking it. We will hold off on Effexor for the time being.   Patient required Neupogen for 3 doses because of an ANC of 1.06 with dose 4 and Neupogen x3 is added for each dose.   The patient is seen 03/20/2014 doing fairly well. Taxol dose decreased to 15% because of neuropathy. Neupogen continued.   The patient was seen on 05/23/2014 doing well. Herceptin continued with plans to check an  echocardiogram before her next treatment in 3 weeks.   MRI of the liver confirmed hemangioma. Echocardiogram is stable at 59%. Herceptin continued at 3-week intervals with the addition of Pepcid and Solu-Cortef 50 mg and the patient is almost certainly going to agree to radiation for her 3 node status, especially since the pathologist at [facility] thought there was extranodal extension.   The patient was seen 07/25/2014. Echocardiogram results noted, slightly reduced from previous, the patient proceeding through radiation therapy of left chest wall. Followup echocardiogram scheduled after her Herceptin therapy 07/25/2014.   Patient was seen on 08/14/2014 with EF of 53%. I discussed th e case with Dr. Bebe Fernandez and did not use the definitive contrast and this may explain the differences, but based on the fact that she had some near syncope on one occasion this weekend and the fact that she has had radiation to the left chest area with a significant skin burn, we will hold Herceptin today and bring her back in 4 weeks, but check her LFTs and ferritin again today.   The patient was 11/14/2014 doing well with ejection fraction of 63% and echocardiogram was reviewed. Herceptin will be continued until mid-January.   The patient is seen 12/29/2014. She had a syncopal episode 12/19/2014. ER workup was negative and neurological evaluation was ordered. Bone scan ordered for right hip pain. If this is negative she will have her port removed in J anuary and she is planning to delay reconstruction for a year.   The patient was evaluated for some pain in her back with a bone scan which was essentially unremarkable except for some degenerative changes at L3, and T6-7-8. Because we were concerned, we ordered an MRI of the thoracic and lumbar spine which was benign with just DJD. No further followup is needed at this time with regard to scans.   Patient seen on 05/08/2015, doing well; occasional migraine-type headache  persists. Port has been removed. She is thinking about reconstruction sometime at the end of the year.   CT of the abdomen done at [facility] in 10/2014 shows a liver lesion, which was indeterminate. They recommended MRI. We have compared to a previous MRI in 2014 and established this is hemangioma.   Patient seen on 12/18/2015 having had CT scans of the chest and abdo men at [facility] for some abdominal discomfort that showed a 3 cm lesion in the dome of the liver, which they were worried about and recommended an MRI, but we reviewed the scans and also compared to the previous MRI of the liver d one in 2014 in October and confirmed that this indeed was a hemangioma that has been stable since 2013. An echo was also done which showed an ejection fraction of 60% and stable.   7/17   patient is a 51-year-old  female with an ER/FL negative HER-2 positive breast cancer node positive T2 N1 M0 treated with Adriamycin and Cytoxan followed by Taxol Herceptin was completed in late 2014 .  He is here for routine follow-up and overall she is doing well she was diagnosed with asthma and  is on an inhaler with good improvement.  She has mild hot flashes but has mood swings and menopausal symptoms as reported into menopause with chemotherapy.  She has no unusual aches or pains but otherwise is doing well and working full-time.  She is not interested in reconstruction at this point.  We did discuss doing extended genetic testing because of her family history we referred her for the extended testing but she has not done that yet She did have a screening colonoscopy and this was negative.  DEXA scan was normal in April of this year.  She is still very anxious about recurrence which is understandable but getting better with time.  7/18  0.9 g IgG kappa MGUS noted the negative skeletal survey and urine protein.  Bone marrow planned workup for recurrent syncope underway per  cardiology    10/18  having intermittent episodes of syncope which sounds vasovagal the last one was in May when she was at a bar drinking with friends .    she's had an echo and a stress test and an EEG andbrain MRI  which was reportedly benign.  She is currently wearing a event monitor During the workup of these syncopal episodes the neurologist ordered an immunoelectrophoresis which showed an IgG paraprotein of 0.8 g/dL.  Need to evaluate this further but I don't think this is anything to do with her syncope unless she has an autonomic neuropathy but she was sitting down at the time she felt nausea  and had her syncopal episode that does not appear to be orthostatic  We reviewed her skeletal survey which is essentially negative her urine did not show any significant Bence Witt protein she has a 0.9 g/dL M protein with a mildly elevated light chain ratio and a mildly elevated beta-2 microglobulin of 2.5      I recommended a bone marrow biopsy and staining for amyloid and this was done and her bone marrow showed 5% plasma cells which are monotypic and have a 13q minus but no amyloid deposition-I explained that she has an MGUS but can proceed to myeloma over time especially because she has an abnormal free light chain ratio and we will watch her M protein closely  I'm not sure this explains her syncope        SOCIAL HISTORY: Single. . She does not smoke. Drinks very little. No drug history.     FAMILY HISTORY: Both of her parents are in their 60s. Mother had kidney cancer and had a partial kidney resection. Brother  of head trauma, another is healthy. She has two sisters who are healthy. She has 2 paternal aunts with breast cancer; one is alive in her 70s, the other  at 48 of breast cancer. A paternal uncle with kidney cancer. Paternal grandfather with colon cancer in his 60s. Multiple paternal cousins with cancer inc luding leukemia and kidney cancer. No other history of breast or ovarian  cancer in the family.     Review of Systems   Constitutional: Positive for fatigue (better 8/23/19).   Respiratory: Positive for chest tightness (breast and chest arms tenderness 8/23/19) and shortness of breath (asthma same 8/23/19).    Gastrointestinal: Negative for constipation, diarrhea, nausea and vomiting.   Musculoskeletal: Positive for arthralgias (worse 8/23/19), back pain (same 8/23/19) and gait problem (left hip bone on bone worse 8/23/19).   Skin: Positive for rash (under left arm 8/23/19).   Allergic/Immunologic: Positive for environmental allergies (same 8/23/19).   Neurological: Positive for numbness (same 8/23/19).   Psychiatric/Behavioral: The patient is nervous/anxious (little better 8/23/19).       A comprehensive 14 point review of systems was performed and was negative except as mentioned.    Medications:  The current medication list was reviewed in the EMR    ALLERGIES:    Allergies   Allergen Reactions   • Ciprofloxacin Hives   • Sulfa Antibiotics Hives   • Amoxicillin Nausea And Vomiting       Objective      There were no vitals filed for this visit.  Current Status 8/23/2019   ECOG score 0       Physical Exam    GENERAL:  Well-developed, well-nourished in no acute distress.   SKIN:  Warm, dry without rashes, purpura or petechiae.  Fading rash in both axilla likely heat rash  HEAD:  Normocephalic.  EYES:  Pupils equal, round and reactive to light.  EOMs intact.  Conjunctivae normal.  EARS:  Hearing intact.  NOSE:  Septum midline.  No excoriations or nasal discharge.  MOUTH:  Tongue is well-papillated; no stomatitis or ulcers.  Lips normal.  THROAT:  Oropharynx without lesions or exudates.  NECK:  Supple with good range of motion; no thyromegaly or masses, no JVD.  LYMPHATICS:  No cervical, supraclavicular, axillary or inguinal adenopathy.  CHEST:  Lungs clear to percussion and auscultation. Good airflow.  BREASTS: Bilateral chest wall is benign with no axillary adenopathy-CARDIAC:  Regular  rate and rhythm without murmurs, rubs or gallops. Normal S1,S2.  ABDOMEN:  Soft, nontender with no organomegaly or masses.  EXTREMITIES:  No clubbing, cyanosis or edema.  NEUROLOGICAL:  Cranial Nerves II-XII grossly intact.  No focal neurological deficits.  PSYCHIATRIC:  Normal affect and mood.        RECENT LABS:  Hematology WBC   Date Value Ref Range Status   02/14/2020 4.49 3.40 - 10.80 10*3/mm3 Final   11/18/2019 5.60 4.5 - 11.0 10*3/uL Final     RBC   Date Value Ref Range Status   02/14/2020 3.96 3.77 - 5.28 10*6/mm3 Final   11/18/2019 3.76 (L) 4.0 - 5.2 10*6/uL Final     Hemoglobin   Date Value Ref Range Status   02/14/2020 11.3 (L) 12.0 - 15.9 g/dL Final   11/18/2019 10.6 (L) 12.0 - 16.0 g/dL Final     Hematocrit   Date Value Ref Range Status   02/14/2020 35.1 34.0 - 46.6 % Final   11/18/2019 32.8 (L) 36.0 - 46.0 % Final     Platelets   Date Value Ref Range Status   02/14/2020 225 140 - 450 10*3/mm3 Final   11/18/2019 281 140 - 440 10*3/uL Final   2D echo and stress echo normal in 5 /18       BONE SURVEY  This report was finalized on 6/25/2018 A standard bone survey was obtained. No discrete lytic or blastic  lesions are identified. There are moderately extensive degenerative disc  changes at the C4-C5 and C5-C6 and C6-C7 levels and at multiple levels  throughout the thoracic spine. No fractures are identified.     IMPRESSIONS: Unremarkable bone survey except for degenerative disc  changes as noted.     This report was finalized on 6/25/2018               Assessment/Plan   1.  T2 N1 ER/[place] negative HER-2 positive left breast cancer 2013 treated with Adriamycin Cytoxan Taxol Herceptin and bilateral mastectomies plus radiation 4.5 Years from diagnosis  2.  BRCA negative  3.  Hemangioma of the liver  4.  family history of malignancy  5.  Mild chronic anemia iron normal -scope 8/19 neg  6.  Anxiety intolerant of Effexor ?  Zyprexa 2.5 mg   7. Recurrent syncope without obvious precipitating cause workup  underway  8.  Incidental finding of a monoclonal gammopathy IgG kappa skeletal survey negative bone marrow showing 5% plasma cells with no amyloid      Plan  1.return for myeloma parameters in 3 months and 6 months  2.  see me in 6 months   3.  Discussed using vaginal estrogens instead of systemic treatment for vaginal dryness   .          2/21/2020      CC:

## 2020-05-29 ENCOUNTER — LAB (OUTPATIENT)
Dept: LAB | Facility: HOSPITAL | Age: 55
End: 2020-05-29

## 2020-05-29 DIAGNOSIS — Z17.0 MALIGNANT NEOPLASM OF UPPER-OUTER QUADRANT OF BOTH BREASTS IN FEMALE, ESTROGEN RECEPTOR POSITIVE (HCC): ICD-10-CM

## 2020-05-29 DIAGNOSIS — C50.411 MALIGNANT NEOPLASM OF UPPER-OUTER QUADRANT OF BOTH BREASTS IN FEMALE, ESTROGEN RECEPTOR POSITIVE (HCC): ICD-10-CM

## 2020-05-29 DIAGNOSIS — D47.2 MGUS (MONOCLONAL GAMMOPATHY OF UNKNOWN SIGNIFICANCE): ICD-10-CM

## 2020-05-29 DIAGNOSIS — C50.412 MALIGNANT NEOPLASM OF UPPER-OUTER QUADRANT OF BOTH BREASTS IN FEMALE, ESTROGEN RECEPTOR POSITIVE (HCC): ICD-10-CM

## 2020-05-29 LAB
BASOPHILS # BLD AUTO: 0.02 10*3/MM3 (ref 0–0.2)
BASOPHILS NFR BLD AUTO: 0.3 % (ref 0–1.5)
DEPRECATED RDW RBC AUTO: 49.4 FL (ref 37–54)
EOSINOPHIL # BLD AUTO: 0.06 10*3/MM3 (ref 0–0.4)
EOSINOPHIL NFR BLD AUTO: 1 % (ref 0.3–6.2)
ERYTHROCYTE [DISTWIDTH] IN BLOOD BY AUTOMATED COUNT: 15.6 % (ref 12.3–15.4)
HCT VFR BLD AUTO: 34.3 % (ref 34–46.6)
HGB BLD-MCNC: 10.9 G/DL (ref 12–15.9)
IMM GRANULOCYTES # BLD AUTO: 0.01 10*3/MM3 (ref 0–0.05)
IMM GRANULOCYTES NFR BLD AUTO: 0.2 % (ref 0–0.5)
LYMPHOCYTES # BLD AUTO: 2.23 10*3/MM3 (ref 0.7–3.1)
LYMPHOCYTES NFR BLD AUTO: 38.6 % (ref 19.6–45.3)
MCH RBC QN AUTO: 27.9 PG (ref 26.6–33)
MCHC RBC AUTO-ENTMCNC: 31.8 G/DL (ref 31.5–35.7)
MCV RBC AUTO: 87.7 FL (ref 79–97)
MONOCYTES # BLD AUTO: 0.59 10*3/MM3 (ref 0.1–0.9)
MONOCYTES NFR BLD AUTO: 10.2 % (ref 5–12)
NEUTROPHILS # BLD AUTO: 2.87 10*3/MM3 (ref 1.7–7)
NEUTROPHILS NFR BLD AUTO: 49.7 % (ref 42.7–76)
NRBC BLD AUTO-RTO: 0 /100 WBC (ref 0–0.2)
PLATELET # BLD AUTO: 225 10*3/MM3 (ref 140–450)
PMV BLD AUTO: 9.2 FL (ref 6–12)
RBC # BLD AUTO: 3.91 10*6/MM3 (ref 3.77–5.28)
WBC NRBC COR # BLD: 5.78 10*3/MM3 (ref 3.4–10.8)

## 2020-05-29 PROCEDURE — 85025 COMPLETE CBC W/AUTO DIFF WBC: CPT

## 2020-05-29 PROCEDURE — 36415 COLL VENOUS BLD VENIPUNCTURE: CPT

## 2020-06-02 LAB
ALBUMIN SERPL-MCNC: 3.9 G/DL (ref 2.9–4.4)
ALBUMIN/GLOB SERPL: 1.2 {RATIO} (ref 0.7–1.7)
ALPHA1 GLOB FLD ELPH-MCNC: 0.2 G/DL (ref 0–0.4)
ALPHA2 GLOB SERPL ELPH-MCNC: 0.8 G/DL (ref 0.4–1)
B-GLOBULIN SERPL ELPH-MCNC: 0.9 G/DL (ref 0.7–1.3)
GAMMA GLOB SERPL ELPH-MCNC: 1.4 G/DL (ref 0.4–1.8)
GLOBULIN SER CALC-MCNC: 3.3 G/DL (ref 2.2–3.9)
IGA SERPL-MCNC: 144 MG/DL (ref 87–352)
IGG SERPL-MCNC: 1802 MG/DL (ref 586–1602)
IGM SERPL-MCNC: 42 MG/DL (ref 26–217)
INTERPRETATION SERPL IEP-IMP: ABNORMAL
KAPPA LC SERPL-MCNC: 67.2 MG/L (ref 3.3–19.4)
KAPPA LC/LAMBDA SER: 7.64 {RATIO} (ref 0.26–1.65)
LAMBDA LC FREE SERPL-MCNC: 8.8 MG/L (ref 5.7–26.3)
Lab: ABNORMAL
M-SPIKE: 0.8 G/DL
PROT SERPL-MCNC: 7.2 G/DL (ref 6–8.5)

## 2020-08-14 ENCOUNTER — LAB (OUTPATIENT)
Dept: LAB | Facility: HOSPITAL | Age: 55
End: 2020-08-14

## 2020-08-14 DIAGNOSIS — Z17.0 MALIGNANT NEOPLASM OF UPPER-OUTER QUADRANT OF BOTH BREASTS IN FEMALE, ESTROGEN RECEPTOR POSITIVE (HCC): ICD-10-CM

## 2020-08-14 DIAGNOSIS — D47.2 MGUS (MONOCLONAL GAMMOPATHY OF UNKNOWN SIGNIFICANCE): ICD-10-CM

## 2020-08-14 DIAGNOSIS — C50.411 MALIGNANT NEOPLASM OF UPPER-OUTER QUADRANT OF BOTH BREASTS IN FEMALE, ESTROGEN RECEPTOR POSITIVE (HCC): ICD-10-CM

## 2020-08-14 DIAGNOSIS — C50.412 MALIGNANT NEOPLASM OF UPPER-OUTER QUADRANT OF BOTH BREASTS IN FEMALE, ESTROGEN RECEPTOR POSITIVE (HCC): ICD-10-CM

## 2020-08-14 LAB
ALBUMIN SERPL-MCNC: 4.3 G/DL (ref 3.5–5.2)
ALBUMIN/GLOB SERPL: 1.3 G/DL (ref 1.1–2.4)
ALP SERPL-CCNC: 61 U/L (ref 38–116)
ALT SERPL W P-5'-P-CCNC: 13 U/L (ref 0–33)
ANION GAP SERPL CALCULATED.3IONS-SCNC: 9.4 MMOL/L (ref 5–15)
AST SERPL-CCNC: 21 U/L (ref 0–32)
BASOPHILS # BLD AUTO: 0.01 10*3/MM3 (ref 0–0.2)
BASOPHILS NFR BLD AUTO: 0.2 % (ref 0–1.5)
BILIRUB SERPL-MCNC: 0.4 MG/DL (ref 0.2–1.2)
BUN SERPL-MCNC: 16 MG/DL (ref 6–20)
BUN/CREAT SERPL: 18.8 (ref 7.3–30)
CALCIUM SPEC-SCNC: 9.7 MG/DL (ref 8.5–10.2)
CHLORIDE SERPL-SCNC: 103 MMOL/L (ref 98–107)
CO2 SERPL-SCNC: 25.6 MMOL/L (ref 22–29)
CREAT SERPL-MCNC: 0.85 MG/DL (ref 0.6–1.1)
DEPRECATED RDW RBC AUTO: 43.8 FL (ref 37–54)
EOSINOPHIL # BLD AUTO: 0.04 10*3/MM3 (ref 0–0.4)
EOSINOPHIL NFR BLD AUTO: 0.7 % (ref 0.3–6.2)
ERYTHROCYTE [DISTWIDTH] IN BLOOD BY AUTOMATED COUNT: 13.7 % (ref 12.3–15.4)
GFR SERPL CREATININE-BSD FRML MDRD: 84 ML/MIN/1.73
GLOBULIN UR ELPH-MCNC: 3.4 GM/DL (ref 1.8–3.5)
GLUCOSE SERPL-MCNC: 97 MG/DL (ref 74–124)
HCT VFR BLD AUTO: 35.3 % (ref 34–46.6)
HGB BLD-MCNC: 11.4 G/DL (ref 12–15.9)
IMM GRANULOCYTES # BLD AUTO: 0.01 10*3/MM3 (ref 0–0.05)
IMM GRANULOCYTES NFR BLD AUTO: 0.2 % (ref 0–0.5)
LYMPHOCYTES # BLD AUTO: 1.59 10*3/MM3 (ref 0.7–3.1)
LYMPHOCYTES NFR BLD AUTO: 29.6 % (ref 19.6–45.3)
MCH RBC QN AUTO: 28.4 PG (ref 26.6–33)
MCHC RBC AUTO-ENTMCNC: 32.3 G/DL (ref 31.5–35.7)
MCV RBC AUTO: 87.8 FL (ref 79–97)
MONOCYTES # BLD AUTO: 0.68 10*3/MM3 (ref 0.1–0.9)
MONOCYTES NFR BLD AUTO: 12.6 % (ref 5–12)
NEUTROPHILS NFR BLD AUTO: 3.05 10*3/MM3 (ref 1.7–7)
NEUTROPHILS NFR BLD AUTO: 56.7 % (ref 42.7–76)
NRBC BLD AUTO-RTO: 0 /100 WBC (ref 0–0.2)
PLATELET # BLD AUTO: 216 10*3/MM3 (ref 140–450)
PMV BLD AUTO: 9.3 FL (ref 6–12)
POTASSIUM SERPL-SCNC: 4.2 MMOL/L (ref 3.5–4.7)
PROT SERPL-MCNC: 7.7 G/DL (ref 6.3–8)
RBC # BLD AUTO: 4.02 10*6/MM3 (ref 3.77–5.28)
SODIUM SERPL-SCNC: 138 MMOL/L (ref 134–145)
WBC # BLD AUTO: 5.38 10*3/MM3 (ref 3.4–10.8)

## 2020-08-14 PROCEDURE — 80053 COMPREHEN METABOLIC PANEL: CPT

## 2020-08-14 PROCEDURE — 36415 COLL VENOUS BLD VENIPUNCTURE: CPT

## 2020-08-14 PROCEDURE — 85025 COMPLETE CBC W/AUTO DIFF WBC: CPT

## 2020-08-20 ENCOUNTER — APPOINTMENT (OUTPATIENT)
Dept: LAB | Facility: HOSPITAL | Age: 55
End: 2020-08-20

## 2020-08-20 ENCOUNTER — OFFICE VISIT (OUTPATIENT)
Dept: ONCOLOGY | Facility: CLINIC | Age: 55
End: 2020-08-20

## 2020-08-20 VITALS
WEIGHT: 167.1 LBS | BODY MASS INDEX: 29.61 KG/M2 | TEMPERATURE: 97.8 F | HEART RATE: 80 BPM | DIASTOLIC BLOOD PRESSURE: 80 MMHG | RESPIRATION RATE: 16 BRPM | OXYGEN SATURATION: 98 % | HEIGHT: 63 IN | SYSTOLIC BLOOD PRESSURE: 121 MMHG

## 2020-08-20 DIAGNOSIS — C50.411 MALIGNANT NEOPLASM OF UPPER-OUTER QUADRANT OF BOTH BREASTS IN FEMALE, ESTROGEN RECEPTOR POSITIVE (HCC): Primary | ICD-10-CM

## 2020-08-20 DIAGNOSIS — Z17.0 MALIGNANT NEOPLASM OF UPPER-OUTER QUADRANT OF BOTH BREASTS IN FEMALE, ESTROGEN RECEPTOR POSITIVE (HCC): Primary | ICD-10-CM

## 2020-08-20 DIAGNOSIS — C50.412 MALIGNANT NEOPLASM OF UPPER-OUTER QUADRANT OF BOTH BREASTS IN FEMALE, ESTROGEN RECEPTOR POSITIVE (HCC): Primary | ICD-10-CM

## 2020-08-20 DIAGNOSIS — D47.2 MGUS (MONOCLONAL GAMMOPATHY OF UNKNOWN SIGNIFICANCE): ICD-10-CM

## 2020-08-20 PROCEDURE — 99214 OFFICE O/P EST MOD 30 MIN: CPT | Performed by: INTERNAL MEDICINE

## 2020-08-20 NOTE — PROGRESS NOTES
Subjective      REASONS FOR FOLLOWUP:   1. T2N1M0, ER/IN negative, HER2 positive breast cancer.   2. BRCA 1 and 2 negative.   3. Adriamycin/Cytoxan followed by Taxol and Herceptin, started on 11/08/2013.   4. Syncopal episode after cycle #3 with stable echo, chemotherapy continued.   5. FUO after cycle 4 of chemotherapy associated with severe mouth pain, marked elevation of liver function tests and ferritin to 44,000, stable echocardiogram, stable cardiac function, ?etiology, and query drug reaction versus viral infection.   6. Taxol weekly initiated on 02/13/2014. Close followup of LFTs and cardiac function.   7. Echocardiogram 03/05/2014 stable at 63%. Treatment continued with Taxol and Herceptin.   8. Neuropathy grade 2. Taxol decreased 15% on 03/20/2014.   9. Q.3 week Herceptin started 05/23/2014 with plans for radiation consult.   10. Itching with Herceptin. Hydrocortisone and Pepcid added to care plan.   11. Patient seen 07/25/2014 with mild reduction in ejection fraction noted. Fo llowup echocardiogram scheduled prior to next visit month, Herceptin continued with hydrocortisone and Pepcid pre-medications.   12. Further drop in the ejection fraction to 53%. Because of some dizziness and near syncope, treatment with Herceptin held with planned followup in 1 month with repeat echocardiogram. MRI done for headaches was negative.   13. EF up to 64%. Herceptin resumed. Previous echocardiogram felt to be suboptimal due to lack of contrast.   14. Syncopal episode 12/19/2014 with a negative cardiac evaluation. Neurology consult recommended.   15. MRI done for headaches. Negative in August 2014.   16. CT of the abdomen done at [facility] in 10/2014 shows a liver lesion, which was indeterminate. They recommended MRI. We have compared to a previous MRI in 2014 and established this is hemangioma.                     17. MGUS Recurrent syncope workup found incidental IgG kappa                               monoclonal  gammopathy  Workup negative negative         History of Present Illness patient is a 52-year-old lady with a node positive HER-2 positive ER negative breast cancer 6  years from completion of adjuvant chemotherapy.      She comes in today for follow-up and overall she is doing well but   She also has an incidentally found IgG MGUS which we are following and appears to been fairly stable now for the last 24 months with no bone marrow evidence of myeloma or excess plasma cells.  Her M protein is 1.0 g which is the same as it was last 2 year and her free light chain ratio is abnormal but stable, I think we can go to 6-month checks for the time being    She had a hip replaced and is doing well from that    She remains very anxious about recurrence of her breast cancer and I told her 6 years out from an ER SC negative HER-2 positive breast cancer would be a good time point and she is almost certainly cured and since she has had bilateral mastectomies new cancers not at all likely    Her CBC stable except for mild anemia and she has had this in the past and we will keep an eye on it and check parameters in 3 months      Active Ambulatory Problems     Diagnosis Date Noted   • Bilateral malignant neoplasm of upper outer quadrant of breast in female (CMS/[facility]) 04/15/2016   • Chronic pain of left knee 01/11/2018   • Acute pain of left knee 01/11/2018   • Contusion of left knee 01/11/2018   • Arthritis of left knee 02/09/2018   • Chondromalacia, patella, left 02/09/2018   • MGUS (monoclonal gammopathy of unknown significance) 06/08/2018   • Arthritis of left hip 01/16/2019   • Left hip pain 01/16/2019     Resolved Ambulatory Problems     Diagnosis Date Noted   • No Resolved Ambulatory Problems     Past Medical History:   Diagnosis Date   • Acquired hypothyroidism    • Anemia    • Arthritis    • Asthma    • Cancer (CMS/[facility]) 2013   • H/O Chondromalacia patellae, left    • H/O Regurgitation, mitral/triscupid,  mild    • H/O Syncope w/collapse    • Heart murmur    • History of chronic fatigue      SURGICAL HISTORY: Fibroadenoma from the left breast in . Uterine fibroids removed laparoscopically in . Left knee arthroscopy in  and hysterectomy in . Ovaries were left in place.     GYN HISTORY: Menarche age 13.  0. She obviously stopped menstruating at the time of her hysterectomy in  and is having hot flashes.     HEMATOLOGIC/ONCOLOGIC HISTORY:    The  patient initially seen on 10/18/2013, a 48-year-old  female who works as a  at the [employer] s office who has a long history of fibrocystic breast disease with multiple ultrasounds and mammograms in the past to evaluate c ysts but after her most recent mammogram, because of some abnormalities, an ultrasound was recommended on 2013 at [facility]. The targeted ultrasound showed 3 circumscribed solid masses in the left breast at the 2 o' clock position, axillar y tail of the breast. The appearance was suggestive of intramammary lymph nodes without a definite fatty hilum which was worrisome for tumor involvement. One of the 3 masses had actually increased in size compared to 2 of the other lesions which were un changed from 2010 suggesting benign etiology. Because of the greater than 20% interval increase in size of one of these nodules since the last mammogram, ultrasound guided biopsy was recommended.   Ultrasound-guided biopsy was done on 2013 and this biopsy showed findings highly suspicious for lymph node involvement by ductal carcinoma and the patient then went to see Dr. Witt who sent her for another biopsy on 2013 and a 1.5 cm mass was seen at the 3 o' clock position of the left breast in luan tion to the 3 abnormal axillary masses which were felt to be lymph nodes. This area was biopsied on 2013 and the path report showed invasive ductal carcinoma and DCIS grade 3 with the  largest focus of invasive cancer being 1 cm and DCIS was interme d iate to high grade. The left axillary lymph node was core biopsied and confirmed metastatic carcinoma. ER/MI were negative, HER2 was 3+ positive. This led to an MRI of the breast 08/27/2013 which showed 3 cm mass in the lower outer quadrant of the left breast and additional clump enhancement in the middle and anterior third of the left breast at the 3 o' clock axis suspicious for in situ carcinoma and 3 rounded masses measured up to 9 mm in greatest dimension are noted consistent with lymph nodes. One ha d a clip from the recent biopsy. Right breast showed stippled foci of variable enhancement with no dominant or clumped findings, prominent right axillary lymph nodes were noted and right axillary lymph node biopsy was done. This was nondiagnostic.   The patient underwent bone scan on 09/09/2013 which was unremarkable except for some increased activity at T9 and the right part of L3 which was again felt to be nonspecific but plain films were recommended. CT scan of the chest, abdomen and pelvis was done which was also unremarkable except for prominent intramammary nodes in the upper outer left breast and several prominent axillary lymph nodes bilaterally one of which has been biopsied on the right. Appearance raised the concern of possible metastatic to both axillae but there were no other sites of involvement. Bone density dated 09/20/2013 was normal. Patient then went for surgery on 09/25/2013 at which time she underwent right mastectomy which was unremarkable and left total mastectomy with axillary dissection which showed a 2.9 cm grade 3 infiltrating ductal carcinoma with associated DCIS, margins were uninvolved. Three of 21 lymph nodes showed metastatic disease making this a pT2N1.   The patient has done well from surgery but has had a lot of prob lems with anxiety and hot flashes but otherwise has done well. She went to see Dr. Cedeno for  recommendations and then came to see us for further discussion of treatment options. In addition, the patient had BRCA testing which thankfully was negative.   T he patient had a MUGA scan, which showed an ejection fraction of 50% and no other abnormalities. Her plain films of the abnormal spots on her bone scan were negative. She had port placed and is ready to start treatment. 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   When she had CT scans of the chest, abdomen and pelvis, they were unremarkable except for hemangioma in the liver and prominent intramammary nodes in the upper outer quadrant of the left breast with several prominent axillary nodes bilaterally.   The patient started dose-dense AC with fairly good tolerance except for some mucositis after cycle 4. She had a febrile illness that required hospitalization from 01/02/2014 - 01/15/2014 with a fever of unknown origin. Multiple viral cultures were negative including CMV, EBV, herpes, respiratory panel and hepatiti s profile was negative. Mouth multiple blood and urine cultures and throat cultures were negative except for Haemophilus parainfluenza for which a course of amoxicillin was given. During the hospitalization the patient spiked fevers continuously and then developed rapid elevation of her liver tests with a ferritin which went up as high as 44,000. The patient defervesced finally and the liver profile started coming down. Echo with bubble echo during her stay was normal with ejection fraction of 60%. The jose francisco shah was discharged home with plans to resume chemotherapy cautiously once her numbers stabilized.   The patient was seen on 01/22/2014 feeling much better. She still is not very active, feeling fatigued and deconditioned from lying in bed for almost  2 we eks. The decision was made because of borderline blood pressure of 94/70 and some mild tachycardia to have her be more active and exercise for another week and reevaluate to start Herceptin on 01/30/2014 with Taxol to be added after 2 doses of Herceptin i f she is tolerating the treatment well and her liver functions are normal. A bone marrow biopsy was also done during her hospital stay and no signs of hemophagocytosis or dysplasia although there was some aberrant myeloid antigen of uncertain significanc e which has been seen with myelodysplasia. She was also seen briefly by Dr. Fatmata Tolbert of Rheumatology while in the hospital for low positive ARTEMIO of 1:160 and anti-SSA antibody greater than 8 but Dr. Tolbert felt this was unlikely to be rheumatologica l disease because 4 doses of Cytoxan should not have precipitated this.   The patient initiated weekly Herceptin for 2 weeks with no significant problems. Taxol weekly initiated on 02/13/2014 with close followup of LFTs and cardiac function.   The patient was seen on 03/06/2014 with echo showing EF of 63%. We will continue Taxol and Herceptin weekly and watch her LFTs closely. Neurontin causes dizziness and she is not taking it. We will hold off on Effexor for the time being.   Patient required Neupogen for 3 doses because of an ANC of 1.06 with dose 4 and Neupogen x3 is added for each dose.   The patient is seen 03/20/2014 doing fairly well. Taxol dose decreased to 15% because of neuropathy. Neupogen continued.   The patient was seen on 05/23/2014 doing well. Herceptin continued with plans to check an echocardiogram before her next treatment in 3 weeks.   MRI of the liver confirmed hemangioma. Echocardiogram is stable at 59%. Herceptin continued at 3-week intervals with the addition of Pepcid and Solu-Cortef 50 mg and the patient is almost certainly going to agree to radiation for her 3 node status, especially since the pathologist at [facility] thought there was extranodal extension.   The patient was seen 07/25/2014. Echocardiogram results noted, slightly reduced from previous, the patient proceeding through radiation therapy of left chest wall. Followup echocardiogram scheduled after her Herceptin therapy 07/25/2014.   Patient was seen on 08/14/2014 with EF of 53%. I discussed th e case with Dr. Bebe Fernandez and did not use the definitive contrast and this may explain the differences, but based on the fact that she had some near syncope on one occasion this weekend and the fact that she has had radiation to the left chest area with a significant skin burn, we will hold Herceptin today and bring her back in 4 weeks, but check her LFTs and ferritin again today.   The patient was 11/14/2014 doing well with ejection fraction of 63% and echocardiogram was reviewed. Herceptin will be continued until mid-January.   The patient is seen 12/29/2014. She had a syncopal episode 12/19/2014. ER workup was negative and neurological evaluation was ordered. Bone scan ordered for right hip pain. If this is negative she will have her port removed in J anuary and she is planning to delay reconstruction for a year.   The patient was evaluated for some pain in her back with a bone scan which was essentially unremarkable except for some degenerative changes at L3, and T6-7-8. Because we were concerned, we ordered an MRI of the thoracic and lumbar spine which was benign with just DJD. No further followup is needed at this time with regard to scans.   Patient seen on 05/08/2015, doing well; occasional migraine-type headache persists. Port has been removed. She is thinking about reconstruction sometime at the end of the year.   CT of the abdomen done at [facility] in 10/2014 shows a liver lesion, which was indeterminate. They recommended MRI. We have compared to a previous MRI in 2014 and established this is hemangioma.   Patient seen on 12/18/2015  having had CT scans of the chest and abdo men at [facility] for some abdominal discomfort that showed a 3 cm lesion in the dome of the liver, which they were worried about and recommended an MRI, but we reviewed the scans and also compared to the previous MRI of the liver d one in 2014 in October and confirmed that this indeed was a hemangioma that has been stable since 2013. An echo was also done which showed an ejection fraction of 60% and stable.   7/17   patient is a 51-year-old  female with an ER/TN negative HER-2 positive breast cancer node positive T2 N1 M0 treated with Adriamycin and Cytoxan followed by Taxol Herceptin was completed in late 2014 .  He is here for routine follow-up and overall she is doing well she was diagnosed with asthma and  is on an inhaler with good improvement.  She has mild hot flashes but has mood swings and menopausal symptoms as reported into menopause with chemotherapy.  She has no unusual aches or pains but otherwise is doing well and working full-time.  She is not interested in reconstruction at this point.  We did discuss doing extended genetic testing because of her family history we referred her for the extended testing but she has not done that yet She did have a screening colonoscopy and this was negative.  DEXA scan was normal in April of this year.  She is still very anxious about recurrence which is understandable but getting better with time.  7/18  0.9 g IgG kappa MGUS noted the negative skeletal survey and urine protein.  Bone marrow planned workup for recurrent syncope underway per cardiology    10/18  having intermittent episodes of syncope which sounds vasovagal the last one was in May when she was at a bar drinking with friends .    she's had an echo and a stress test and an EEG andbrain MRI  which was reportedly benign.  She is currently wearing a event monitor During the workup of these syncopal episodes the neurologist  ordered an immunoelectrophoresis which showed an IgG paraprotein of 0.8 g/dL.  Need to evaluate this further but I don't think this is anything to do with her syncope unless she has an autonomic neuropathy but she was sitting down at the time she felt nausea  and had her syncopal episode that does not appear to be orthostatic  We reviewed her skeletal survey which is essentially negative her urine did not show any significant Bence Witt protein she has a 0.9 g/dL M protein with a mildly elevated light chain ratio and a mildly elevated beta-2 microglobulin of 2.5      I recommended a bone marrow biopsy and staining for amyloid and this was done and her bone marrow showed 5% plasma cells which are monotypic and have a 13q minus but no amyloid deposition-I explained that she has an MGUS but can proceed to myeloma over time especially because she has an abnormal free light chain ratio and we will watch her M protein closely  I'm not sure this explains her syncope        SOCIAL HISTORY: Single. . She does not smoke. Drinks very little. No drug history.     FAMILY HISTORY: Both of her parents are in their 60s. Mother had kidney cancer and had a partial kidney resection. Brother  of head trauma, another is healthy. She has two sisters who are healthy. She has 2 paternal aunts with breast cancer; one is alive in her 70s, the other  at 48 of breast cancer. A paternal uncle with kidney cancer. Paternal grandfather with colon cancer in his 60s. Multiple paternal cousins with cancer inc luding leukemia and kidney cancer. No other history of breast or ovarian cancer in the family.     Review of Systems   Constitutional: Positive for fatigue (unchanged 2020).   Respiratory: Positive for chest tightness (breast and chest arms tenderness unchanged2020) and shortness of breath (asthma unchanged2020).    Gastrointestinal: Negative for constipation, diarrhea, nausea and vomiting.  "  Musculoskeletal: Positive for arthralgias (worse 8/20/2020), back pain (same 8/20/2020) and gait problem (left hip bone on bone worse 8/20/2020).   Skin: Positive for rash (under left arm 8/20/2020).   Allergic/Immunologic: Positive for environmental allergies (improved 8/20/2020).   Neurological: Positive for numbness (improved 8/20/2020).   Psychiatric/Behavioral: The patient is nervous/anxious (little better 8/20/2020).       A comprehensive 14 point review of systems was performed and was negative except as mentioned.    Medications:  The current medication list was reviewed in the EMR    ALLERGIES:    Allergies   Allergen Reactions   • Ciprofloxacin Hives   • Sulfa Antibiotics Hives   • Amoxicillin Nausea And Vomiting       Objective      Vitals:    08/20/20 1647   BP: 121/80   Pulse: 80   Resp: 16   Temp: 97.8 °F (36.6 °C)   TempSrc: Skin   SpO2: 98%   Weight: 75.8 kg (167 lb 1.6 oz)   Height: 161 cm (63.39\")   PainSc: 0-No pain     Current Status 8/20/2020   ECOG score 0       Physical Exam    GENERAL:  Well-developed, well-nourished in no acute distress.   SKIN:  Warm, dry without rashes, purpura or petechiae.  Fading rash in both axilla likely heat rash  HEAD:  Normocephalic.  EYES:  Pupils equal, round and reactive to light.  EOMs intact.  Conjunctivae normal.  EARS:  Hearing intact.  NOSE:  Septum midline.  No excoriations or nasal discharge.  MOUTH:  Tongue is well-papillated; no stomatitis or ulcers.  Lips normal.  THROAT:  Oropharynx without lesions or exudates.  NECK:  Supple with good range of motion; no thyromegaly or masses, no JVD.  LYMPHATICS:  No cervical, supraclavicular, axillary or inguinal adenopathy.  CHEST:  Lungs clear to percussion and auscultation. Good airflow.  BREASTS: Bilateral chest wall is benign with no axillary adenopathy-  CARDIAC:  Regular rate and rhythm without murmurs, rubs or gallops. Normal S1,S2.  ABDOMEN:  Soft, nontender with no organomegaly or masses.  EXTREMITIES:  " No clubbing, cyanosis or edema.  NEUROLOGICAL:  Cranial Nerves II-XII grossly intact.  No focal neurological deficits.  PSYCHIATRIC:  Normal affect and mood.        RECENT LABS:  Hematology WBC   Date Value Ref Range Status   08/14/2020 5.38 3.40 - 10.80 10*3/mm3 Final   11/18/2019 5.60 4.5 - 11.0 10*3/uL Final     RBC   Date Value Ref Range Status   08/14/2020 4.02 3.77 - 5.28 10*6/mm3 Final   11/18/2019 3.76 (L) 4.0 - 5.2 10*6/uL Final     Hemoglobin   Date Value Ref Range Status   08/14/2020 11.4 (L) 12.0 - 15.9 g/dL Final   11/18/2019 10.6 (L) 12.0 - 16.0 g/dL Final     Hematocrit   Date Value Ref Range Status   08/14/2020 35.3 34.0 - 46.6 % Final   11/18/2019 32.8 (L) 36.0 - 46.0 % Final     Platelets   Date Value Ref Range Status   08/14/2020 216 140 - 450 10*3/mm3 Final   11/18/2019 281 140 - 440 10*3/uL Final   2D echo and stress echo normal in 5 /18       BONE SURVEY  This report was finalized on 6/25/2018 A standard bone survey was obtained. No discrete lytic or blastic  lesions are identified. There are moderately extensive degenerative disc  changes at the C4-C5 and C5-C6 and C6-C7 levels and at multiple levels  throughout the thoracic spine. No fractures are identified.     IMPRESSIONS: Unremarkable bone survey except for degenerative disc  changes as noted.     This report was finalized on 6/25/2018               Assessment/Plan   1.  T2 N1 ER/OK negative HER-2 positive left breast cancer 2013 treated with Adriamycin Cytoxan Taxol Herceptin and bilateral mastectomies plus radiation 4.5 Years from diagnosis  2.  BRCA negative VUS ABRAXAS1  3.  Hemangioma of the liver  4.  family history of malignancy  5.  Mild chronic anemia iron normal -scope 8/19 neg  6.  Anxiety intolerant of Effexor ?  Zyprexa 2.5 mg   7. Recurrent syncope without obvious precipitating cause workup underway  8.  Incidental finding of a monoclonal gammopathy IgG kappa skeletal survey negative bone marrow 8/18  showing 5% plasma cells  with no amyloid      Plan  1.return for myeloma parameters in 6 months  2.  see me in 12 months   3.  Discussed using vaginal estrogens instead of systemic treatment for vaginal dryness if symptoms do not respond to oral replacement  may be needed  .          8/20/2020      CC:

## 2020-11-23 ENCOUNTER — OFFICE (AMBULATORY)
Dept: URBAN - METROPOLITAN AREA CLINIC 75 | Facility: CLINIC | Age: 55
End: 2020-11-23
Payer: COMMERCIAL

## 2020-11-23 VITALS — HEIGHT: 63 IN | WEIGHT: 165 LBS

## 2020-11-23 DIAGNOSIS — K21.9 GASTRO-ESOPHAGEAL REFLUX DISEASE WITHOUT ESOPHAGITIS: ICD-10-CM

## 2020-11-23 DIAGNOSIS — R10.32 LEFT LOWER QUADRANT PAIN: ICD-10-CM

## 2020-11-23 DIAGNOSIS — Z86.010 PERSONAL HISTORY OF COLONIC POLYPS: ICD-10-CM

## 2020-11-23 DIAGNOSIS — Z80.0 FAMILY HISTORY OF MALIGNANT NEOPLASM OF DIGESTIVE ORGANS: ICD-10-CM

## 2020-11-23 PROCEDURE — 99214 OFFICE O/P EST MOD 30 MIN: CPT | Performed by: NURSE PRACTITIONER

## 2020-11-23 RX ORDER — DICYCLOMINE HYDROCHLORIDE 10 MG/1
CAPSULE ORAL
Qty: 0 | Refills: 0 | Status: COMPLETED
End: 2020-12-28

## 2020-12-23 VITALS
DIASTOLIC BLOOD PRESSURE: 83 MMHG | HEART RATE: 59 BPM | SYSTOLIC BLOOD PRESSURE: 150 MMHG | DIASTOLIC BLOOD PRESSURE: 79 MMHG | HEART RATE: 79 BPM | HEART RATE: 62 BPM | SYSTOLIC BLOOD PRESSURE: 96 MMHG | HEART RATE: 89 BPM | SYSTOLIC BLOOD PRESSURE: 138 MMHG | HEART RATE: 71 BPM | DIASTOLIC BLOOD PRESSURE: 58 MMHG | RESPIRATION RATE: 18 BRPM | HEIGHT: 63 IN | WEIGHT: 165 LBS | SYSTOLIC BLOOD PRESSURE: 121 MMHG | TEMPERATURE: 97.8 F | DIASTOLIC BLOOD PRESSURE: 51 MMHG | RESPIRATION RATE: 17 BRPM | RESPIRATION RATE: 14 BRPM | RESPIRATION RATE: 13 BRPM | DIASTOLIC BLOOD PRESSURE: 67 MMHG | OXYGEN SATURATION: 100 % | RESPIRATION RATE: 12 BRPM | DIASTOLIC BLOOD PRESSURE: 73 MMHG | HEART RATE: 75 BPM | OXYGEN SATURATION: 99 % | HEART RATE: 91 BPM | OXYGEN SATURATION: 97 % | TEMPERATURE: 96.8 F | SYSTOLIC BLOOD PRESSURE: 128 MMHG | OXYGEN SATURATION: 98 % | HEART RATE: 72 BPM | DIASTOLIC BLOOD PRESSURE: 82 MMHG | SYSTOLIC BLOOD PRESSURE: 125 MMHG | DIASTOLIC BLOOD PRESSURE: 89 MMHG | SYSTOLIC BLOOD PRESSURE: 145 MMHG

## 2020-12-28 ENCOUNTER — AMBULATORY SURGICAL CENTER (AMBULATORY)
Dept: URBAN - METROPOLITAN AREA SURGERY 17 | Facility: SURGERY | Age: 55
End: 2020-12-28

## 2020-12-28 DIAGNOSIS — R10.32 LEFT LOWER QUADRANT PAIN: ICD-10-CM

## 2020-12-28 DIAGNOSIS — Z80.0 FAMILY HISTORY OF MALIGNANT NEOPLASM OF DIGESTIVE ORGANS: ICD-10-CM

## 2020-12-28 DIAGNOSIS — Z86.010 PERSONAL HISTORY OF COLONIC POLYPS: ICD-10-CM

## 2020-12-28 PROCEDURE — 45378 DIAGNOSTIC COLONOSCOPY: CPT | Performed by: INTERNAL MEDICINE

## 2021-02-04 ENCOUNTER — CLINICAL SUPPORT (OUTPATIENT)
Dept: ONCOLOGY | Facility: HOSPITAL | Age: 56
End: 2021-02-04

## 2021-02-04 ENCOUNTER — LAB (OUTPATIENT)
Dept: LAB | Facility: HOSPITAL | Age: 56
End: 2021-02-04

## 2021-02-04 DIAGNOSIS — D47.2 MGUS (MONOCLONAL GAMMOPATHY OF UNKNOWN SIGNIFICANCE): ICD-10-CM

## 2021-02-04 LAB
BASOPHILS # BLD AUTO: 0.01 10*3/MM3 (ref 0–0.2)
BASOPHILS NFR BLD AUTO: 0.2 % (ref 0–1.5)
DEPRECATED RDW RBC AUTO: 42.6 FL (ref 37–54)
EOSINOPHIL # BLD AUTO: 0.07 10*3/MM3 (ref 0–0.4)
EOSINOPHIL NFR BLD AUTO: 1.5 % (ref 0.3–6.2)
ERYTHROCYTE [DISTWIDTH] IN BLOOD BY AUTOMATED COUNT: 13.6 % (ref 12.3–15.4)
HCT VFR BLD AUTO: 35.4 % (ref 34–46.6)
HGB BLD-MCNC: 11.5 G/DL (ref 12–15.9)
IMM GRANULOCYTES # BLD AUTO: 0 10*3/MM3 (ref 0–0.05)
IMM GRANULOCYTES NFR BLD AUTO: 0 % (ref 0–0.5)
LYMPHOCYTES # BLD AUTO: 1.75 10*3/MM3 (ref 0.7–3.1)
LYMPHOCYTES NFR BLD AUTO: 37.7 % (ref 19.6–45.3)
MCH RBC QN AUTO: 28 PG (ref 26.6–33)
MCHC RBC AUTO-ENTMCNC: 32.5 G/DL (ref 31.5–35.7)
MCV RBC AUTO: 86.1 FL (ref 79–97)
MONOCYTES # BLD AUTO: 0.49 10*3/MM3 (ref 0.1–0.9)
MONOCYTES NFR BLD AUTO: 10.6 % (ref 5–12)
NEUTROPHILS NFR BLD AUTO: 2.32 10*3/MM3 (ref 1.7–7)
NEUTROPHILS NFR BLD AUTO: 50 % (ref 42.7–76)
NRBC BLD AUTO-RTO: 0 /100 WBC (ref 0–0.2)
PLATELET # BLD AUTO: 229 10*3/MM3 (ref 140–450)
PMV BLD AUTO: 9.4 FL (ref 6–12)
RBC # BLD AUTO: 4.11 10*6/MM3 (ref 3.77–5.28)
WBC # BLD AUTO: 4.64 10*3/MM3 (ref 3.4–10.8)

## 2021-02-04 PROCEDURE — 85025 COMPLETE CBC W/AUTO DIFF WBC: CPT

## 2021-02-04 PROCEDURE — G0463 HOSPITAL OUTPT CLINIC VISIT: HCPCS

## 2021-02-04 PROCEDURE — 36415 COLL VENOUS BLD VENIPUNCTURE: CPT

## 2021-02-04 NOTE — NURSING NOTE
Pt here for labs and RN review. CBC reviewed with pt. Counts are stable for this pt at this time. Pt stated she is feeling well, that she had a lymphedema flare up, but she is scheduled to see her specialist for that. Pt has no other c/o at this time. Copy of labs given to pt. Pt v/u to call with any concerns.       Lab Results   Component Value Date    WBC 4.64 02/04/2021    HGB 11.5 (L) 02/04/2021    HCT 35.4 02/04/2021    MCV 86.1 02/04/2021     02/04/2021

## 2021-02-05 LAB
ALBUMIN SERPL ELPH-MCNC: 3.8 G/DL (ref 2.9–4.4)
ALBUMIN/GLOB SERPL: 1.1 {RATIO} (ref 0.7–1.7)
ALPHA1 GLOB SERPL ELPH-MCNC: 0.2 G/DL (ref 0–0.4)
ALPHA2 GLOB SERPL ELPH-MCNC: 0.7 G/DL (ref 0.4–1)
B-GLOBULIN SERPL ELPH-MCNC: 1.1 G/DL (ref 0.7–1.3)
GAMMA GLOB SERPL ELPH-MCNC: 1.8 G/DL (ref 0.4–1.8)
GLOBULIN SER-MCNC: 3.8 G/DL (ref 2.2–3.9)
IGA SERPL-MCNC: 137 MG/DL (ref 87–352)
IGG SERPL-MCNC: 1889 MG/DL (ref 586–1602)
IGM SERPL-MCNC: 38 MG/DL (ref 26–217)
INTERPRETATION SERPL IEP-IMP: ABNORMAL
KAPPA LC FREE SER-MCNC: 82.5 MG/L (ref 3.3–19.4)
KAPPA LC FREE/LAMBDA FREE SER: 7.11 {RATIO} (ref 0.26–1.65)
LABORATORY COMMENT REPORT: ABNORMAL
LAMBDA LC FREE SERPL-MCNC: 11.6 MG/L (ref 5.7–26.3)
M PROTEIN SERPL ELPH-MCNC: 1.1 G/DL
PROT SERPL-MCNC: 7.6 G/DL (ref 6–8.5)

## 2021-03-24 ENCOUNTER — BULK ORDERING (OUTPATIENT)
Dept: CASE MANAGEMENT | Facility: OTHER | Age: 56
End: 2021-03-24

## 2021-03-24 DIAGNOSIS — Z23 IMMUNIZATION DUE: ICD-10-CM

## 2021-06-17 ENCOUNTER — OFFICE VISIT (OUTPATIENT)
Dept: CARDIOLOGY | Facility: CLINIC | Age: 56
End: 2021-06-17

## 2021-06-17 ENCOUNTER — HOSPITAL ENCOUNTER (OUTPATIENT)
Dept: CARDIOLOGY | Facility: HOSPITAL | Age: 56
Discharge: HOME | End: 2021-06-17
Admitting: INTERNAL MEDICINE

## 2021-06-17 ENCOUNTER — TELEPHONE (OUTPATIENT)
Dept: CARDIOLOGY | Facility: CLINIC | Age: 56
End: 2021-06-17

## 2021-06-17 VITALS
HEIGHT: 63 IN | HEART RATE: 58 BPM | WEIGHT: 169 LBS | BODY MASS INDEX: 29.95 KG/M2 | DIASTOLIC BLOOD PRESSURE: 82 MMHG | SYSTOLIC BLOOD PRESSURE: 122 MMHG

## 2021-06-17 DIAGNOSIS — Z92.21 STATUS POST ADMINISTRATION OF CARDIOTOXIC CHEMOTHERAPY: ICD-10-CM

## 2021-06-17 DIAGNOSIS — R06.09 DOE (DYSPNEA ON EXERTION): ICD-10-CM

## 2021-06-17 DIAGNOSIS — R00.2 PALPITATIONS: ICD-10-CM

## 2021-06-17 DIAGNOSIS — R07.2 PRECORDIAL PAIN: Primary | ICD-10-CM

## 2021-06-17 DIAGNOSIS — R07.2 PRECORDIAL PAIN: ICD-10-CM

## 2021-06-17 PROCEDURE — 93350 STRESS TTE ONLY: CPT

## 2021-06-17 PROCEDURE — 93325 DOPPLER ECHO COLOR FLOW MAPG: CPT | Performed by: INTERNAL MEDICINE

## 2021-06-17 PROCEDURE — 93325 DOPPLER ECHO COLOR FLOW MAPG: CPT

## 2021-06-17 PROCEDURE — 93356 MYOCRD STRAIN IMG SPCKL TRCK: CPT

## 2021-06-17 PROCEDURE — 93320 DOPPLER ECHO COMPLETE: CPT | Performed by: INTERNAL MEDICINE

## 2021-06-17 PROCEDURE — 93320 DOPPLER ECHO COMPLETE: CPT

## 2021-06-17 PROCEDURE — 93356 MYOCRD STRAIN IMG SPCKL TRCK: CPT | Performed by: INTERNAL MEDICINE

## 2021-06-17 PROCEDURE — 25010000002 PERFLUTREN (DEFINITY) 8.476 MG IN SODIUM CHLORIDE (PF) 0.9 % 10 ML INJECTION: Performed by: INTERNAL MEDICINE

## 2021-06-17 PROCEDURE — 93017 CV STRESS TEST TRACING ONLY: CPT

## 2021-06-17 PROCEDURE — 93016 CV STRESS TEST SUPVJ ONLY: CPT | Performed by: INTERNAL MEDICINE

## 2021-06-17 PROCEDURE — 93350 STRESS TTE ONLY: CPT | Performed by: INTERNAL MEDICINE

## 2021-06-17 PROCEDURE — 99204 OFFICE O/P NEW MOD 45 MIN: CPT | Performed by: INTERNAL MEDICINE

## 2021-06-17 PROCEDURE — 93000 ELECTROCARDIOGRAM COMPLETE: CPT | Performed by: INTERNAL MEDICINE

## 2021-06-17 PROCEDURE — 93018 CV STRESS TEST I&R ONLY: CPT | Performed by: INTERNAL MEDICINE

## 2021-06-17 PROCEDURE — 93352 ADMIN ECG CONTRAST AGENT: CPT | Performed by: INTERNAL MEDICINE

## 2021-06-17 RX ORDER — ASCORBIC ACID 250 MG
TABLET ORAL
COMMUNITY
End: 2021-06-17

## 2021-06-17 RX ORDER — IPRATROPIUM BROMIDE AND ALBUTEROL SULFATE 2.5; .5 MG/3ML; MG/3ML
3 SOLUTION RESPIRATORY (INHALATION) EVERY 4 HOURS PRN
COMMUNITY

## 2021-06-17 RX ORDER — OMEPRAZOLE 40 MG/1
40 CAPSULE, DELAYED RELEASE ORAL DAILY
COMMUNITY
End: 2022-09-15

## 2021-06-17 RX ORDER — NITROGLYCERIN 0.4 MG/1
0.4 TABLET SUBLINGUAL
COMMUNITY
Start: 2021-06-02 | End: 2022-09-15

## 2021-06-17 RX ORDER — ALBUTEROL SULFATE 90 UG/1
2 AEROSOL, METERED RESPIRATORY (INHALATION) EVERY 4 HOURS PRN
COMMUNITY

## 2021-06-17 RX ORDER — LEVOTHYROXINE SODIUM 0.07 MG/1
75 TABLET ORAL DAILY
COMMUNITY
Start: 2021-02-15 | End: 2021-06-17

## 2021-06-17 RX ORDER — ALBUTEROL SULFATE 2.5 MG/3ML
SOLUTION RESPIRATORY (INHALATION)
COMMUNITY
Start: 2020-12-31 | End: 2021-06-17

## 2021-06-17 RX ORDER — VITAMIN B COMPLEX
CAPSULE ORAL
COMMUNITY
End: 2021-06-17

## 2021-06-17 RX ADMIN — PERFLUTREN 3 ML: 6.52 INJECTION, SUSPENSION INTRAVENOUS at 14:54

## 2021-06-17 NOTE — PROGRESS NOTES
Date of Office Visit: 2021  Encounter Provider: Bebe Fernandez MD  Place of Service: [facility] CARDIOLOGY  Patient Name: Santos Witt  :1965    Chief complaint  Consult quested by Dr. Stinson for cardio oncology evaluation.    History of Present Illness  Patient is a 56-year-old female with history of mitral valve prolapse, hypothyroidism, asthma, anemia and breast cancer followed by Dr. Stinson.  The patient has a history of mitral prolapse and had a stress echocardiogram in 2018 that showed an ejection fraction 62% with mild mitral valve regurgitation (no mention of prolapse) mild tricuspid regurgitation with normal right-sided pressures.  Stress echo did not show ischemia at 10 METS.    She was diagnosed with left-sided breast cancer in  treated with Adriamycin/Cytoxan/Taxol and Herceptin.  She had bilateral mastectomy and radiation therapy.  During chemotherapy ejection fraction fell from 63% to 53%.  Herceptin was held and subsequently resumed when ejection fraction improved to 64%.  Stress echocardiogram  with an ejection fraction was 62% with no ischemia as above.  She  was subsequeshently found to have MGUS which is felt to be stable    Patient states that she has had several episodes of syncope while she was receiving chemotherapy.  Since then she has had intermittent episodes of dizziness that occur infrequently once a month lasting for few seconds occasionally occurs while she is sitting still working on her computer.  She denies any palpitations.  She has had palpitations occur independent of this lasting just for few seconds.  She notes that more recently since 2021 coping with her mother's illness she has had midsternal chest tightness is nonradiating may last 5 to 10 minutes that occurs with stress and with activity such as rushing.  She had a more intense episode of midsternal chest pain that woke her from her sleep on 2021.  This  lasted for several minutes and resolved and has not recurred since then.  She has continued to have the exertional left-sided parasternal discomfort.  She also notes dyspnea on exertion over the past 1 year.    Past Medical History:   Diagnosis Date   • Acquired hypothyroidism    • Anemia     History of anemia   • Arthritis     Knees   • Asthma    • Cancer (CMS/HCC) 2013    Left breast cancer, T2NM0   • Chest pain    • H/O Chondromalacia patellae, left    • H/O Regurgitation, mitral/triscupid, mild    • H/O Syncope w/collapse    • Heart murmur     Childhood   • History of chronic fatigue    • MVP (mitral valve prolapse)      Past Surgical History:   Procedure Laterality Date   • BREAST BIOPSY Left 08/2013    x2    • BREAST CYST EXCISION Left 1982    Fibroid cyst left nipple   • BREAST SURGERY Bilateral 09/2013    Mastectomy   • HYSTERECTOMY  06/2011    Partial   • KNEE ARTHROSCOPY Left 2006   • PELVIC LAPAROSCOPY  2002    cysts removed     Outpatient Medications Prior to Visit   Medication Sig Dispense Refill   • Acetaminophen 500 MG capsule Take  by mouth.     • albuterol sulfate  (90 Base) MCG/ACT inhaler Inhale 2 puffs Every 4 (Four) Hours As Needed for Wheezing.     • ascorbic acid (VITAMIN C) 500 MG tablet Take 1,000 mg by mouth Daily.     • B Complex Vitamins (VITAMIN B COMPLEX) capsule capsule Take  by mouth Daily.     • Calcium-Magnesium-Vitamin D 400-166.7-133.3 MG-MG-UNIT tablet Take  by mouth.     • Cholecalciferol (VITAMIN D3) 5000 UNITS capsule capsule Take 5,000 Units by mouth 1 (One) Time Per Week.     • EPINEPHrine (EPIPEN) 0.3 MG/0.3ML solution auto-injector injection INJECT 0.3 MILLILITER (0.3 MG) BY INTRAMUSCULAR ROUTE ONCE AS NEEDED FOR ANAPHYLAXIS  0   • fluticasone-salmeterol (ADVAIR) 100-50 MCG/DOSE DISKUS Inhale 2 (Two) Times a Day.     • ipratropium-albuterol (DUO-NEB) 0.5-2.5 mg/3 ml nebulizer Take 3 mL by nebulization Every 4 (Four) Hours As Needed for Wheezing.     • levothyroxine  (SYNTHROID, LEVOTHROID) 75 MCG tablet Take 75 mcg by mouth.     • LORATADINE PO Take  by mouth.     • magnesium oxide (MAGOX) 400 (241.3 Mg) MG tablet tablet Take 400 mg by mouth Daily.     • Multiple Vitamin (MULTI-VITAMINS PO) Take  by mouth daily.     • nitroglycerin (NITROSTAT) 0.4 MG SL tablet Place 0.4 mg under the tongue.     • omeprazole (priLOSEC) 40 MG capsule Take 40 mg by mouth Daily.     • PATIENT SUPPLIED ALLERGY INJECTION Inject  under the skin into the appropriate area as directed 1 (One) Time.     • saccharomyces boulardii (FLORASTOR) 250 MG capsule Take 250 mg by mouth.     • Triamcinolone Acetonide (NASACORT) 55 MCG/ACT nasal inhaler APPLY 2 SPRAYS BY NASAL ROUTE DAILY FOR 30 DAYS  11   • albuterol (PROVENTIL) (2.5 MG/3ML) 0.083% nebulizer solution Inhale 2.5 mg As Needed.     • albuterol (PROVENTIL) (2.5 MG/3ML) 0.083% nebulizer solution INHALE 3 MILLILITERS (2.5 MG) BY NEBULIZATION ROUTE 3 TIMES PER DAY AS NEEDED FOR 30 DAYS     • fluticasone-salmeterol (ADVAIR HFA) 115-21 MCG/ACT inhaler Inhale 2 puffs 2 (Two) Times a Day.     • MAGNESIUM PO Take  by mouth Daily.     • Advair Diskus 100-50 MCG/DOSE DISKUS INHALE 1 PUFF 2 TIMES PER DAY IN THE MORNING AND EVENING APPROXIMATELY 12 HOURS APART FOR 30 DAYS     • ALBUTEROL IN Inhale as needed.     • ascorbic acid (VITAMIN C) 250 MG tablet Take  by mouth.     • B Complex Vitamins (vitamin b complex) capsule capsule Take  by mouth.     • BREO ELLIPTA 100-25 MCG/INH inhaler INHALE 1 PUFF BY INHALATION ROUTE ONCE DAILY AT THE SAME TIME EACH DAY FOR 30 DAYS     • fluticasone-salmeterol (ADVAIR) 100-50 MCG/DOSE DISKUS Inhale.     • levothyroxine (SYNTHROID, LEVOTHROID) 75 MCG tablet Take 75 mcg by mouth Daily.     • montelukast (SINGULAIR) 10 MG tablet TAKE 1 TABLET BY MOUTH EVERY DAY IN THE EVENING       No facility-administered medications prior to visit.       Allergies as of 06/17/2021 - Reviewed 06/17/2021   Allergen Reaction Noted   • Ciprofloxacin  Hives 03/15/2016   • Sulfa antibiotics Hives 03/15/2016   • Amoxicillin Nausea And Vomiting 03/15/2016     Social History     Socioeconomic History   • Marital status: Single     Spouse name: Not on file   • Number of children: 0   • Years of education: High school   • Highest education level: Not on file   Tobacco Use   • Smoking status: Never Smoker   • Smokeless tobacco: Never Used   Substance and Sexual Activity   • Alcohol use: Yes     Comment: Very little   • Drug use: No   • Sexual activity: Defer     Family History   Problem Relation Age of Onset   • Kidney cancer Mother         Treated surgically   • Heart disease Mother    • Hypertension Mother    • Thyroid disease Mother    • Lung cancer Mother    • Heart attack Mother    • Hypertension Father    • Heart disease Father    • Heart attack Father    • Thyroid disease Sister    • No Known Problems Brother    • Breast cancer Paternal Aunt    • Heart disease Paternal Aunt    • Kidney cancer Paternal Uncle    • Colon cancer Paternal Grandfather         In his 60s   • Breast cancer Paternal Aunt    • Diabetes Maternal Aunt    • Pancreatic cancer Other    • Heart disease Maternal Grandmother    • Heart attack Maternal Grandmother    • Heart disease Paternal Grandmother    • Heart attack Paternal Grandmother      Review of Systems   Constitutional: Negative for chills, fever, weight gain and weight loss.   Cardiovascular: Negative for leg swelling.   Respiratory: Negative for cough, snoring and wheezing.    Hematologic/Lymphatic: Negative for bleeding problem. Does not bruise/bleed easily.   Skin: Negative for color change.   Musculoskeletal: Negative for falls, joint pain and myalgias.   Gastrointestinal: Negative for melena.   Genitourinary: Negative for hematuria.   Neurological: Negative for excessive daytime sleepiness.   Psychiatric/Behavioral: Negative for depression. The patient is not nervous/anxious.         Objective:     Vitals:    06/17/21 1203   BP:  "122/82   BP Location: Right arm   Pulse: 58   Weight: 76.7 kg (169 lb)   Height: 160 cm (63\")     Body mass index is 29.94 kg/m².    Vitals reviewed.   Constitutional:       Appearance: Well-developed.   Eyes:      General: No scleral icterus.        Right eye: No discharge.      Conjunctiva/sclera: Conjunctivae normal.      Pupils: Pupils are equal, round, and reactive to light.   HENT:      Head: Normocephalic.      Nose: Nose normal.   Neck:      Thyroid: No thyromegaly.      Vascular: No JVD.   Pulmonary:      Effort: Pulmonary effort is normal. No respiratory distress.      Breath sounds: Normal breath sounds. No wheezing. No rales.   Cardiovascular:      Normal rate. Regular rhythm. Normal S1. Normal S2.      Murmurs: There is a grade 1/6 systolic murmur at the URSB and ULSB.      No gallop.   Pulses:     Intact distal pulses.   Edema:     Peripheral edema absent.   Abdominal:      General: Bowel sounds are normal. There is no distension.      Palpations: Abdomen is soft.      Tenderness: There is no abdominal tenderness. There is no rebound.   Musculoskeletal: Normal range of motion.         General: No tenderness.      Cervical back: Normal range of motion and neck supple. Skin:     General: Skin is warm and dry.      Findings: No erythema or rash.   Neurological:      Mental Status: Alert and oriented to person, place, and time.   Psychiatric:         Behavior: Behavior normal.         Thought Content: Thought content normal.         Judgment: Judgment normal.       Lab Review:     ECG 12 Lead    Date/Time: 6/17/2021 12:22 PM  Performed by: Bebe Fernandez MD  Authorized by: Bebe Fernandez MD   Comparison: compared with previous ECG   Similar to previous ECG  Rhythm: sinus rhythm  Other findings: left ventricular hypertrophy and left atrial abnormality    Clinical impression: abnormal EKG          Assessment:       Diagnosis Plan   1. Precordial pain  ECG 12 Lead    Adult Transthoracic Echo Complete W/ Cont if " Necessary Per Protocol    Adult Stress Echo W/ Cont or Stress Agent if Necessary Per Protocol   2. Status post administration of cardiotoxic chemotherapy  Adult Transthoracic Echo Complete W/ Cont if Necessary Per Protocol   3. PALMA (dyspnea on exertion)     4. Palpitations       Plan:       1.  Chest pain.  Has had burning midsternal chest discomfort that woke her from sleep as well as left-sided parasternal discomfort that occurs and exertional/emotional exacerbated pattern.  Both are concerning especially with history of chemoradiation therapy in the past.  We will check an echocardiogram.  I also discussed risks and benefits of stress test versus cardiac catheterization.  At this point favor stress test and will try to get a stress echocardiogram today.  In addition asked to check her blood pressure with these events.  Also recommended she consider further for sleep apnea symptoms.  She attributes much of her fatigue currently to stresses of dealing with her mother's illness and subsequent passing  2.  Dyspnea on exertion.  As above  3.  Palpitations.  These are infrequent observe for now and obtain testing as above  4.  Syncope with history of left anthracycline and left-sided radiation.  Mild residual dizziness.  Observe for now with thing as above  5.  History of breast cancer, status post anthracycline and Herceptin exposure in the distant past as well as left-sided radiation therapy.  Assess as above.  Track 5 with stop bang score of 2  6.  History of reflux disease.  EGD done last fall did not show any significant findings.  She remains on omeprazole  7.  Family history of premature atherosclerotic disease    Addendum: she received 5040 cGy to left chest      Time Spent: I spent 50 minutes caring for Santos on this date of service. This time includes time spent by me in the following activities: preparing for the visit, reviewing tests, obtaining and/or reviewing a separately obtained history, performing a  medically appropriate examination and/or evaluation, counseling and educating the patient/family/caregiver, ordering medications, tests, or procedures, documenting information in the medical record and independently interpreting results and communicating that information with the patient/family/caregiver.   I spent 1 minutes on the separately reported service of ECG. This time is not included in the time used to support the E/M service also reported today.        Your medication list          Accurate as of June 17, 2021 11:59 PM. If you have any questions, ask your nurse or doctor.            CHANGE how you take these medications      Instructions Last Dose Given Next Dose Due   albuterol sulfate  (90 Base) MCG/ACT inhaler  Commonly known as: PROVENTIL HFA;VENTOLIN HFA;PROAIR HFA  What changed: Another medication with the same name was removed. Continue taking this medication, and follow the directions you see here.  Changed by: Bebe Fernandez MD      Inhale 2 puffs Every 4 (Four) Hours As Needed for Wheezing.       ascorbic acid 500 MG tablet  Commonly known as: VITAMIN C  What changed: Another medication with the same name was removed. Continue taking this medication, and follow the directions you see here.  Changed by: Bebe Fernandez MD      Take 1,000 mg by mouth Daily.       fluticasone-salmeterol 100-50 MCG/DOSE DISKUS  Commonly known as: ADVAIR  What changed: Another medication with the same name was removed. Continue taking this medication, and follow the directions you see here.  Changed by: Bebe Fernandez MD      Inhale 2 (Two) Times a Day.       levothyroxine 75 MCG tablet  Commonly known as: SYNTHROID, LEVOTHROID  What changed: Another medication with the same name was removed. Continue taking this medication, and follow the directions you see here.  Changed by: Bebe Fernandez MD      Take 75 mcg by mouth.       vitamin b complex capsule capsule  What changed: Another medication with the same name was  removed. Continue taking this medication, and follow the directions you see here.  Changed by: Bebe Fernandez MD      Take  by mouth Daily.          CONTINUE taking these medications      Instructions Last Dose Given Next Dose Due   Acetaminophen 500 MG capsule      Take  by mouth.       Calcium-Magnesium-Vitamin D 400-166.7-133.3 MG-MG-UNIT tablet      Take  by mouth.       EPINEPHrine 0.3 MG/0.3ML solution auto-injector injection  Commonly known as: EPIPEN      INJECT 0.3 MILLILITER (0.3 MG) BY INTRAMUSCULAR ROUTE ONCE AS NEEDED FOR ANAPHYLAXIS       ipratropium-albuterol 0.5-2.5 mg/3 ml nebulizer  Commonly known as: DUO-NEB      Take 3 mL by nebulization Every 4 (Four) Hours As Needed for Wheezing.       LORATADINE PO      Take  by mouth.       magnesium oxide 400 (241.3 Mg) MG tablet tablet  Commonly known as: MAGOX      Take 400 mg by mouth Daily.       multivitamin tablet tablet  Commonly known as: THERAGRAN      Take  by mouth daily.       nitroglycerin 0.4 MG SL tablet  Commonly known as: NITROSTAT      Place 0.4 mg under the tongue.       omeprazole 40 MG capsule  Commonly known as: priLOSEC      Take 40 mg by mouth Daily.       PATIENT SUPPLIED ALLERGY INJECTION      Inject  under the skin into the appropriate area as directed 1 (One) Time.       saccharomyces boulardii 250 MG capsule  Commonly known as: FLORASTOR      Take 250 mg by mouth.       Triamcinolone Acetonide 55 MCG/ACT nasal inhaler  Commonly known as: NASACORT      APPLY 2 SPRAYS BY NASAL ROUTE DAILY FOR 30 DAYS       vitamin D3 125 MCG (5000 UT) capsule capsule      Take 5,000 Units by mouth 1 (One) Time Per Week.          STOP taking these medications    ALBUTEROL IN  Stopped by: Bebe Fernandez MD        Breo Ellipta 100-25 MCG/INH inhaler  Generic drug: Fluticasone Furoate-Vilanterol  Stopped by: Bebe Fernandez MD        MAGNESIUM PO  Stopped by: Bebe Fernandez MD        montelukast 10 MG tablet  Commonly known as: SINGULAIR  Stopped by: Bebe ROSALES  MD Jim               Patient is no longer taking albuterol, magnesium, Breo, Singulair.  I corrected the med list to reflect this.  I did not stop these medications.      Dictated utilizing Dragon dictation

## 2021-06-18 ENCOUNTER — TELEPHONE (OUTPATIENT)
Dept: CARDIOLOGY | Facility: CLINIC | Age: 56
End: 2021-06-18

## 2021-06-18 NOTE — TELEPHONE ENCOUNTER
Talk to patient regarding stress echo results.  She will monitor her blood pressure more closely.  Please get me a copy of the recent CT scan of the chest performed with her oncologist.

## 2021-06-23 PROBLEM — Z92.21 STATUS POST ADMINISTRATION OF CARDIOTOXIC CHEMOTHERAPY: Status: ACTIVE | Noted: 2021-06-23

## 2021-06-23 PROBLEM — R07.2 PRECORDIAL PAIN: Status: ACTIVE | Noted: 2021-06-23

## 2021-06-23 NOTE — TELEPHONE ENCOUNTER
I spoke with [facility] Radiation Oncology dept and spoke with Donte in Dosimetry @ [phone].  Pt had 5040 cGy to left chest wall.

## 2021-06-25 LAB
ASCENDING AORTA: 2.6 CM
BH CV ECHO MEAS - ACS: 1.8 CM
BH CV ECHO MEAS - AO ARCH DIAM (PROXIMAL TRANS.): 2.3 CM
BH CV ECHO MEAS - AO MAX PG (FULL): 6.8 MMHG
BH CV ECHO MEAS - AO MAX PG: 12.9 MMHG
BH CV ECHO MEAS - AO MEAN PG (FULL): 3.3 MMHG
BH CV ECHO MEAS - AO MEAN PG: 6.9 MMHG
BH CV ECHO MEAS - AO ROOT AREA (BSA CORRECTED): 1.6
BH CV ECHO MEAS - AO ROOT AREA: 6.6 CM^2
BH CV ECHO MEAS - AO ROOT DIAM: 2.9 CM
BH CV ECHO MEAS - AO V2 MAX: 179.8 CM/SEC
BH CV ECHO MEAS - AO V2 MEAN: 123.2 CM/SEC
BH CV ECHO MEAS - AO V2 VTI: 41 CM
BH CV ECHO MEAS - ASC AORTA: 2.6 CM
BH CV ECHO MEAS - AVA(I,A): 1.6 CM^2
BH CV ECHO MEAS - AVA(I,D): 1.6 CM^2
BH CV ECHO MEAS - AVA(V,A): 1.5 CM^2
BH CV ECHO MEAS - AVA(V,D): 1.5 CM^2
BH CV ECHO MEAS - BSA(HAYCOCK): 1.9 M^2
BH CV ECHO MEAS - BSA: 1.8 M^2
BH CV ECHO MEAS - BZI_BMI: 30.1 KILOGRAMS/M^2
BH CV ECHO MEAS - BZI_METRIC_HEIGHT: 160 CM
BH CV ECHO MEAS - BZI_METRIC_WEIGHT: 77.1 KG
BH CV ECHO MEAS - EDV(MOD-SP4): 91.7 ML
BH CV ECHO MEAS - EDV(SP4-EL): 96.7 ML
BH CV ECHO MEAS - EDV(TEICH): 82.3 ML
BH CV ECHO MEAS - EF(CUBED): 76.6 %
BH CV ECHO MEAS - EF(MOD-BP): 65 %
BH CV ECHO MEAS - EF(MOD-SP4): 59.7 %
BH CV ECHO MEAS - EF(SP4-EL): 64.9 %
BH CV ECHO MEAS - EF(TEICH): 69 %
BH CV ECHO MEAS - ESV(MOD-SP4): 37 ML
BH CV ECHO MEAS - ESV(SP4-EL): 33.9 ML
BH CV ECHO MEAS - ESV(TEICH): 25.5 ML
BH CV ECHO MEAS - FS: 38.4 %
BH CV ECHO MEAS - IVS/LVPW: 0.99
BH CV ECHO MEAS - IVSD: 1.3 CM
BH CV ECHO MEAS - LAT PEAK E' VEL: 8.6 CM/SEC
BH CV ECHO MEAS - LV DIASTOLIC VOL/BSA (35-75): 50.8 ML/M^2
BH CV ECHO MEAS - LV MASS(C)D: 203.2 GRAMS
BH CV ECHO MEAS - LV MASS(C)DI: 112.6 GRAMS/M^2
BH CV ECHO MEAS - LV MAX PG: 6.2 MMHG
BH CV ECHO MEAS - LV MEAN PG: 3.6 MMHG
BH CV ECHO MEAS - LV SYSTOLIC VOL/BSA (12-30): 20.5 ML/M^2
BH CV ECHO MEAS - LV V1 MAX: 124.3 CM/SEC
BH CV ECHO MEAS - LV V1 MEAN: 91.9 CM/SEC
BH CV ECHO MEAS - LV V1 VTI: 31.3 CM
BH CV ECHO MEAS - LVAD AP4: 27.7 CM^2
BH CV ECHO MEAS - LVAS AP4: 15 CM^2
BH CV ECHO MEAS - LVIDD: 4.3 CM
BH CV ECHO MEAS - LVIDS: 2.6 CM
BH CV ECHO MEAS - LVLD AP4: 6.7 CM
BH CV ECHO MEAS - LVLS AP4: 5.6 CM
BH CV ECHO MEAS - LVOT AREA (M): 2 CM^2
BH CV ECHO MEAS - LVOT AREA: 2.1 CM^2
BH CV ECHO MEAS - LVOT DIAM: 1.6 CM
BH CV ECHO MEAS - LVPWD: 1.3 CM
BH CV ECHO MEAS - MED PEAK E' VEL: 7.5 CM/SEC
BH CV ECHO MEAS - MR MAX PG: 95.5 MMHG
BH CV ECHO MEAS - MR MAX VEL: 488.7 CM/SEC
BH CV ECHO MEAS - MV DEC SLOPE: 406.7 CM/SEC^2
BH CV ECHO MEAS - MV MAX PG: 4.9 MMHG
BH CV ECHO MEAS - MV MEAN PG: 1.4 MMHG
BH CV ECHO MEAS - MV P1/2T MAX VEL: 99.8 CM/SEC
BH CV ECHO MEAS - MV P1/2T: 71.9 MSEC
BH CV ECHO MEAS - MV V2 MAX: 110.6 CM/SEC
BH CV ECHO MEAS - MV V2 MEAN: 48.2 CM/SEC
BH CV ECHO MEAS - MV V2 VTI: 30.8 CM
BH CV ECHO MEAS - MVA P1/2T LCG: 2.2 CM^2
BH CV ECHO MEAS - MVA(P1/2T): 3.1 CM^2
BH CV ECHO MEAS - MVA(VTI): 2.2 CM^2
BH CV ECHO MEAS - PA ACC TIME: 0.17 SEC
BH CV ECHO MEAS - PA MAX PG (FULL): 4.2 MMHG
BH CV ECHO MEAS - PA MAX PG: 5.6 MMHG
BH CV ECHO MEAS - PA PR(ACCEL): 4.1 MMHG
BH CV ECHO MEAS - PA V2 MAX: 118.1 CM/SEC
BH CV ECHO MEAS - PVA(V,A): 1.7 CM^2
BH CV ECHO MEAS - PVA(V,D): 1.7 CM^2
BH CV ECHO MEAS - QP/QS: 0.8
BH CV ECHO MEAS - RV MAX PG: 1.4 MMHG
BH CV ECHO MEAS - RV MEAN PG: 0.72 MMHG
BH CV ECHO MEAS - RV V1 MAX: 59.1 CM/SEC
BH CV ECHO MEAS - RV V1 MEAN: 39.9 CM/SEC
BH CV ECHO MEAS - RV V1 VTI: 15.3 CM
BH CV ECHO MEAS - RVOT AREA: 3.5 CM^2
BH CV ECHO MEAS - RVOT DIAM: 2.1 CM
BH CV ECHO MEAS - SI(AO): 150.4 ML/M^2
BH CV ECHO MEAS - SI(CUBED): 33.4 ML/M^2
BH CV ECHO MEAS - SI(LVOT): 36.8 ML/M^2
BH CV ECHO MEAS - SI(MOD-SP4): 30.3 ML/M^2
BH CV ECHO MEAS - SI(SP4-EL): 34.8 ML/M^2
BH CV ECHO MEAS - SI(TEICH): 31.5 ML/M^2
BH CV ECHO MEAS - SV(AO): 271.4 ML
BH CV ECHO MEAS - SV(CUBED): 60.2 ML
BH CV ECHO MEAS - SV(LVOT): 66.4 ML
BH CV ECHO MEAS - SV(MOD-SP4): 54.7 ML
BH CV ECHO MEAS - SV(RVOT): 52.9 ML
BH CV ECHO MEAS - SV(SP4-EL): 62.8 ML
BH CV ECHO MEAS - SV(TEICH): 56.8 ML
BH CV ECHO MEAS - TAPSE (>1.6): 2.7 CM
BH CV ECHO MEAS - TR MAX VEL: 266 CM/SEC
BH CV STRESS BP STAGE 1: NORMAL
BH CV STRESS BP STAGE 2: NORMAL
BH CV STRESS DURATION MIN STAGE 1: 3
BH CV STRESS DURATION MIN STAGE 2: 3
BH CV STRESS DURATION MIN STAGE 3: 1
BH CV STRESS DURATION SEC STAGE 1: 0
BH CV STRESS DURATION SEC STAGE 2: 0
BH CV STRESS DURATION SEC STAGE 3: 0
BH CV STRESS ECHO POST STRESS EJECTION FRACTION EF: 80 %
BH CV STRESS GRADE STAGE 1: 10
BH CV STRESS GRADE STAGE 2: 12
BH CV STRESS GRADE STAGE 3: 14
BH CV STRESS HR STAGE 1: 124
BH CV STRESS HR STAGE 2: 138
BH CV STRESS HR STAGE 3: 154
BH CV STRESS METS STAGE 1: 5
BH CV STRESS METS STAGE 2: 7.5
BH CV STRESS METS STAGE 3: 8
BH CV STRESS PROTOCOL 1: NORMAL
BH CV STRESS RECOVERY HR: 81 BPM
BH CV STRESS SPEED STAGE 1: 1.7
BH CV STRESS SPEED STAGE 2: 2.5
BH CV STRESS SPEED STAGE 3: 3.4
BH CV STRESS STAGE 1: 1
BH CV STRESS STAGE 2: 2
BH CV STRESS STAGE 3: 3
BH CV XLRA - RV BASE: 2.9 CM
BH CV XLRA - RV LENGTH: 5.8 CM
BH CV XLRA - RV MID: 2 CM
BH CV XLRA - TDI S': 11.2 CM/SEC
LEFT ATRIUM VOLUME INDEX: 28 ML/M2
LV EF 2D ECHO EST: 65 %
MAXIMAL PREDICTED HEART RATE: 164 BPM
PERCENT MAX PREDICTED HR: 93.9 %
SINUS: 2.7 CM
STJ: 2.2 CM
STRESS BASELINE BP: NORMAL MMHG
STRESS BASELINE HR: 70 BPM
STRESS PERCENT HR: 110 %
STRESS POST ESTIMATED WORKLOAD: 8 METS
STRESS POST EXERCISE DUR MIN: 7 MIN
STRESS POST EXERCISE DUR SEC: 0 SEC
STRESS POST PEAK BP: NORMAL MMHG
STRESS POST PEAK HR: 154 BPM
STRESS TARGET HR: 139 BPM

## 2021-07-06 ENCOUNTER — HOSPITAL ENCOUNTER (OUTPATIENT)
Dept: CARDIOLOGY | Facility: HOSPITAL | Age: 56
Discharge: HOME | End: 2021-07-06

## 2021-07-06 DIAGNOSIS — Z92.21 STATUS POST ADMINISTRATION OF CARDIOTOXIC CHEMOTHERAPY: ICD-10-CM

## 2021-07-06 DIAGNOSIS — R07.2 PRECORDIAL PAIN: ICD-10-CM

## 2021-07-06 LAB
MAXIMAL PREDICTED HEART RATE: 164 BPM
STRESS TARGET HR: 139 BPM

## 2021-08-16 ENCOUNTER — TELEPHONE (OUTPATIENT)
Dept: ONCOLOGY | Facility: CLINIC | Age: 56
End: 2021-08-16

## 2021-08-16 NOTE — TELEPHONE ENCOUNTER
Provider: CHOLO    Caller:RADHA    Relationship to Patient: SELF      Phone Number: [phone]    Reason for Call: PATIENT INQUIRED ABOUT MEDICAL EXEMPT FROM COVID VACCCINE

## 2021-08-17 ENCOUNTER — TELEPHONE (OUTPATIENT)
Dept: ONCOLOGY | Facility: CLINIC | Age: 56
End: 2021-08-17

## 2021-08-17 NOTE — TELEPHONE ENCOUNTER
Caller: RADHA    Relationship to patient: SELF    Best call back number: [phone]    Patient is needing:TO GET A MEDICAL EXEMPTION FOR COVID-19 VACCINE.

## 2021-08-17 NOTE — TELEPHONE ENCOUNTER
Patient called PCP for exemption and they told her to call Dr Stinson. She has an appt at end of the month so I instructed her to talk to Dr Stinson at that time. She verbalized understanding.

## 2021-08-20 ENCOUNTER — LAB (OUTPATIENT)
Dept: LAB | Facility: HOSPITAL | Age: 56
End: 2021-08-20

## 2021-08-20 DIAGNOSIS — D47.2 MGUS (MONOCLONAL GAMMOPATHY OF UNKNOWN SIGNIFICANCE): ICD-10-CM

## 2021-08-20 DIAGNOSIS — C50.411 MALIGNANT NEOPLASM OF UPPER-OUTER QUADRANT OF BOTH BREASTS IN FEMALE, ESTROGEN RECEPTOR POSITIVE (HCC): ICD-10-CM

## 2021-08-20 DIAGNOSIS — Z17.0 MALIGNANT NEOPLASM OF UPPER-OUTER QUADRANT OF BOTH BREASTS IN FEMALE, ESTROGEN RECEPTOR POSITIVE (HCC): ICD-10-CM

## 2021-08-20 DIAGNOSIS — C50.412 MALIGNANT NEOPLASM OF UPPER-OUTER QUADRANT OF BOTH BREASTS IN FEMALE, ESTROGEN RECEPTOR POSITIVE (HCC): ICD-10-CM

## 2021-08-20 LAB
ALBUMIN SERPL-MCNC: 4.4 G/DL (ref 3.5–5.2)
ALBUMIN/GLOB SERPL: 1.3 G/DL (ref 1.1–2.4)
ALP SERPL-CCNC: 65 U/L (ref 38–116)
ALT SERPL W P-5'-P-CCNC: 16 U/L (ref 0–33)
ANION GAP SERPL CALCULATED.3IONS-SCNC: 10.7 MMOL/L (ref 5–15)
AST SERPL-CCNC: 23 U/L (ref 0–32)
BASOPHILS # BLD AUTO: 0.02 10*3/MM3 (ref 0–0.2)
BASOPHILS NFR BLD AUTO: 0.4 % (ref 0–1.5)
BILIRUB SERPL-MCNC: 0.5 MG/DL (ref 0.2–1.2)
BUN SERPL-MCNC: 19 MG/DL (ref 6–20)
BUN/CREAT SERPL: 21.6 (ref 7.3–30)
CALCIUM SPEC-SCNC: 9.4 MG/DL (ref 8.5–10.2)
CHLORIDE SERPL-SCNC: 104 MMOL/L (ref 98–107)
CO2 SERPL-SCNC: 24.3 MMOL/L (ref 22–29)
CREAT SERPL-MCNC: 0.88 MG/DL (ref 0.6–1.1)
DEPRECATED RDW RBC AUTO: 44 FL (ref 37–54)
EOSINOPHIL # BLD AUTO: 0.05 10*3/MM3 (ref 0–0.4)
EOSINOPHIL NFR BLD AUTO: 1 % (ref 0.3–6.2)
ERYTHROCYTE [DISTWIDTH] IN BLOOD BY AUTOMATED COUNT: 14 % (ref 12.3–15.4)
GFR SERPL CREATININE-BSD FRML MDRD: 81 ML/MIN/1.73
GLOBULIN UR ELPH-MCNC: 3.4 GM/DL (ref 1.8–3.5)
GLUCOSE SERPL-MCNC: 82 MG/DL (ref 74–124)
HCT VFR BLD AUTO: 35.9 % (ref 34–46.6)
HGB BLD-MCNC: 11.8 G/DL (ref 12–15.9)
IMM GRANULOCYTES # BLD AUTO: 0.01 10*3/MM3 (ref 0–0.05)
IMM GRANULOCYTES NFR BLD AUTO: 0.2 % (ref 0–0.5)
LYMPHOCYTES # BLD AUTO: 1.82 10*3/MM3 (ref 0.7–3.1)
LYMPHOCYTES NFR BLD AUTO: 34.9 % (ref 19.6–45.3)
MCH RBC QN AUTO: 28.6 PG (ref 26.6–33)
MCHC RBC AUTO-ENTMCNC: 32.9 G/DL (ref 31.5–35.7)
MCV RBC AUTO: 87.1 FL (ref 79–97)
MONOCYTES # BLD AUTO: 0.67 10*3/MM3 (ref 0.1–0.9)
MONOCYTES NFR BLD AUTO: 12.8 % (ref 5–12)
NEUTROPHILS NFR BLD AUTO: 2.65 10*3/MM3 (ref 1.7–7)
NEUTROPHILS NFR BLD AUTO: 50.7 % (ref 42.7–76)
NRBC BLD AUTO-RTO: 0 /100 WBC (ref 0–0.2)
PLATELET # BLD AUTO: 233 10*3/MM3 (ref 140–450)
PMV BLD AUTO: 9.3 FL (ref 6–12)
POTASSIUM SERPL-SCNC: 4 MMOL/L (ref 3.5–4.7)
PROT SERPL-MCNC: 7.8 G/DL (ref 6.3–8)
RBC # BLD AUTO: 4.12 10*6/MM3 (ref 3.77–5.28)
SODIUM SERPL-SCNC: 139 MMOL/L (ref 134–145)
WBC # BLD AUTO: 5.22 10*3/MM3 (ref 3.4–10.8)

## 2021-08-20 PROCEDURE — 36415 COLL VENOUS BLD VENIPUNCTURE: CPT

## 2021-08-20 PROCEDURE — 85025 COMPLETE CBC W/AUTO DIFF WBC: CPT

## 2021-08-20 PROCEDURE — 80053 COMPREHEN METABOLIC PANEL: CPT

## 2021-08-23 LAB
ALBUMIN SERPL ELPH-MCNC: 3.7 G/DL (ref 2.9–4.4)
ALBUMIN/GLOB SERPL: 1 {RATIO} (ref 0.7–1.7)
ALPHA1 GLOB SERPL ELPH-MCNC: 0.2 G/DL (ref 0–0.4)
ALPHA2 GLOB SERPL ELPH-MCNC: 0.9 G/DL (ref 0.4–1)
B-GLOBULIN SERPL ELPH-MCNC: 1.2 G/DL (ref 0.7–1.3)
GAMMA GLOB SERPL ELPH-MCNC: 1.9 G/DL (ref 0.4–1.8)
GLOBULIN SER-MCNC: 4.1 G/DL (ref 2.2–3.9)
IGA SERPL-MCNC: 120 MG/DL (ref 87–352)
IGG SERPL-MCNC: 1855 MG/DL (ref 586–1602)
IGM SERPL-MCNC: 39 MG/DL (ref 26–217)
INTERPRETATION SERPL IEP-IMP: ABNORMAL
KAPPA LC FREE SER-MCNC: 91.7 MG/L (ref 3.3–19.4)
KAPPA LC FREE/LAMBDA FREE SER: 8.99 {RATIO} (ref 0.26–1.65)
LABORATORY COMMENT REPORT: ABNORMAL
LAMBDA LC FREE SERPL-MCNC: 10.2 MG/L (ref 5.7–26.3)
M PROTEIN SERPL ELPH-MCNC: 1.1 G/DL
PROT SERPL-MCNC: 7.8 G/DL (ref 6–8.5)

## 2021-08-27 ENCOUNTER — APPOINTMENT (OUTPATIENT)
Dept: LAB | Facility: HOSPITAL | Age: 56
End: 2021-08-27

## 2021-08-27 ENCOUNTER — OFFICE VISIT (OUTPATIENT)
Dept: ONCOLOGY | Facility: CLINIC | Age: 56
End: 2021-08-27

## 2021-08-27 VITALS
BODY MASS INDEX: 30.56 KG/M2 | HEART RATE: 73 BPM | HEIGHT: 63 IN | DIASTOLIC BLOOD PRESSURE: 83 MMHG | OXYGEN SATURATION: 99 % | WEIGHT: 172.5 LBS | RESPIRATION RATE: 14 BRPM | TEMPERATURE: 97.3 F | SYSTOLIC BLOOD PRESSURE: 149 MMHG

## 2021-08-27 DIAGNOSIS — C50.412 MALIGNANT NEOPLASM OF UPPER-OUTER QUADRANT OF BOTH BREASTS IN FEMALE, ESTROGEN RECEPTOR POSITIVE (HCC): Primary | ICD-10-CM

## 2021-08-27 DIAGNOSIS — C50.411 MALIGNANT NEOPLASM OF UPPER-OUTER QUADRANT OF BOTH BREASTS IN FEMALE, ESTROGEN RECEPTOR POSITIVE (HCC): Primary | ICD-10-CM

## 2021-08-27 DIAGNOSIS — Z17.0 MALIGNANT NEOPLASM OF UPPER-OUTER QUADRANT OF BOTH BREASTS IN FEMALE, ESTROGEN RECEPTOR POSITIVE (HCC): Primary | ICD-10-CM

## 2021-08-27 PROCEDURE — 99215 OFFICE O/P EST HI 40 MIN: CPT | Performed by: INTERNAL MEDICINE

## 2021-08-27 NOTE — PROGRESS NOTES
Subjective      REASONS FOR FOLLOWUP:   1. T2N1M0, ER/UT negative, HER2 positive breast cancer.   2. BRCA 1 and 2 negative.   3. Adriamycin/Cytoxan followed by Taxol and Herceptin, started on 11/08/2013.   4. Syncopal episode after cycle #3 with stable echo, chemotherapy continued.   5. FUO after cycle 4 of chemotherapy associated with severe mouth pain, marked elevation of liver function tests and ferritin to 44,000, stable echocardiogram, stable cardiac function, ?etiology, and query drug reaction versus viral infection.   6. Taxol weekly initiated on 02/13/2014. Close followup of LFTs and cardiac function.   7. Echocardiogram 03/05/2014 stable at 63%. Treatment continued with Taxol and Herceptin.   8. Neuropathy grade 2. Taxol decreased 15% on 03/20/2014.   9. Q.3 week Herceptin started 05/23/2014 with plans for radiation consult.   10. Itching with Herceptin. Hydrocortisone and Pepcid added to care plan.   11. Patient seen 07/25/2014 with mild reduction in ejection fraction noted. Fo llowup echocardiogram scheduled prior to next visit month, Herceptin continued with hydrocortisone and Pepcid pre-medications.   12. Further drop in the ejection fraction to 53%. Because of some dizziness and near syncope, treatment with Herceptin held with planned followup in 1 month with repeat echocardiogram. MRI done for headaches was negative.   13. EF up to 64%. Herceptin resumed. Previous echocardiogram felt to be suboptimal due to lack of contrast.   14. Syncopal episode 12/19/2014 with a negative cardiac evaluation. Neurology consult recommended.   15. MRI done for headaches. Negative in August 2014.   16. CT of the abdomen done at [facility] in 10/2014 shows a liver lesion, which was indeterminate. They recommended MRI. We have compared to a previous MRI in 2014 and established this is hemangioma.                     17. MGUS Recurrent syncope workup found incidental IgG kappa                               monoclonal  gammopathy  Workup negative negative         History of Present Illness patient is a 56-year-old lady with a node positive HER-2 positive ER negative breast cancer 8  years from completion of adjuvant chemotherapy.      She comes in today for follow-up and overall she is doing well but   She also has an incidentally found IgG MGUS which we are following and appears to been fairly stable now for the last 24 months with no bone marrow evidence of myeloma or excess plasma cells.  Her M protein is 1.1 g which is the same as it was last 2 year and her free light chain ratio is abnormal but slowly rising, I think we can continue 6-month checks for the time being    She had a hip replaced and is doing well from that    She remains very anxious about recurrence of her breast cancer and I told her 8 years out from an ER OK negative HER-2 positive breast cancer would be a good time point and she is almost certainly cured and since she has had bilateral mastectomies new cancers not at all likely    She is having some issues with lymphedema in her left hand and also some pain in her left neck with radicular type symptoms into her left arm and I have asked for a C-spine MRI to evaluate this but this is almost certainly benign and degenerative        Her CBC stable except for mild anemia and she has had this in the past and we will keep an eye on it     She is wanting an exemption from her Covid vaccination because of her lymphedema and I told her this was not a contraindication and I strongly advised her to get vaccinated because of her MGUS     Active Ambulatory Problems     Diagnosis Date Noted   • Bilateral malignant neoplasm of upper outer quadrant of breast in female (CMS/[facility]) 04/15/2016   • Chronic pain of left knee 01/11/2018   • Acute pain of left knee 01/11/2018   • Contusion of left knee 01/11/2018   • Arthritis of left knee 02/09/2018   • Chondromalacia, patella, left 02/09/2018   • MGUS  (monoclonal gammopathy of unknown significance) 2018   • Arthritis of left hip 2019   • Left hip pain 2019   • Status post administration of cardiotoxic chemotherapy 2021   • Precordial pain 2021     Resolved Ambulatory Problems     Diagnosis Date Noted   • No Resolved Ambulatory Problems     Past Medical History:   Diagnosis Date   • Acquired hypothyroidism    • Anemia    • Arthritis    • Asthma    • Cancer (CMS/HCC)    • Chest pain    • H/O Chondromalacia patellae, left    • H/O Regurgitation, mitral/triscupid, mild    • H/O Syncope w/collapse    • Heart murmur    • History of chronic fatigue    • MVP (mitral valve prolapse)      SURGICAL HISTORY: Fibroadenoma from the left breast in . Uterine fibroids removed laparoscopically in . Left knee arthroscopy in  and hysterectomy in . Ovaries were left in place.     GYN HISTORY: Menarche age 13.  0. She obviously stopped menstruating at the time of her hysterectomy in  and is having hot flashes.     HEMATOLOGIC/ONCOLOGIC HISTORY:    The  patient initially seen on 10/18/2013, a 48-year-old  female who works as a  at the [employer] ' s office who has a long history of fibrocystic breast disease with multiple ultrasounds and mammograms in the past to evaluate c ysts but after her most recent mammogram, because of some abnormalities, an ultrasound was recommended on 2013 at [facility]. The targeted ultrasound showed 3 circumscribed solid masses in the left breast at the 2 o' clock position, axillar y tail of the breast. The appearance was suggestive of intramammary lymph nodes without a definite fatty hilum which was worrisome for tumor involvement. One of the 3 masses had actually increased in size compared to 2 of the other lesions which were un changed from 2010 suggesting benign etiology. Because of the greater than 20% interval increase in size of one of these  nodules since the last mammogram, ultrasound guided biopsy was recommended.   Ultrasound-guided biopsy was done on 08/01/2013 and this biopsy showed findings highly suspicious for lymph node involvement by ductal carcinoma and the patient then went to see Dr. Witt who sent her for another biopsy on 08/19/2013 and a 1.5 cm mass was seen at the 3 o' clock position of the left breast in luan tion to the 3 abnormal axillary masses which were felt to be lymph nodes. This area was biopsied on 08/19/2013 and the path report showed invasive ductal carcinoma and DCIS grade 3 with the largest focus of invasive cancer being 1 cm and DCIS was interme d iate to high grade. The left axillary lymph node was core biopsied and confirmed metastatic carcinoma. ER/IA were negative, HER2 was 3+ positive. This led to an MRI of the breast 08/27/2013 which showed 3 cm mass in the lower outer quadrant of the left breast and additional clump enhancement in the middle and anterior third of the left breast at the 3 o' clock axis suspicious for in situ carcinoma and 3 rounded masses measured up to 9 mm in greatest dimension are noted consistent with lymph nodes. One ha d a clip from the recent biopsy. Right breast showed stippled foci of variable enhancement with no dominant or clumped findings, prominent right axillary lymph nodes were noted and right axillary lymph node biopsy was done. This was nondiagnostic.   The patient underwent bone scan on 09/09/2013 which was unremarkable except for some increased activity at T9 and the right part of L3 which was again felt to be nonspecific but plain films were recommended. CT scan of the chest, abdomen and pelvis was done which was also unremarkable except for prominent intramammary nodes in the upper outer left breast and several prominent axillary lymph nodes bilaterally one of which has been biopsied on the right. Appearance raised the concern of possible metastatic to both axillae but there  were no other sites of involvement. Bone density dated 09/20/2013 was normal. Patient then went for surgery on 09/25/2013 at which time she underwent right mastectomy which was unremarkable and left total mastectomy with axillary dissection which showed a 2.9 cm grade 3 infiltrating ductal carcinoma with associated DCIS, margins were uninvolved. Three of 21 lymph nodes showed metastatic disease making this a pT2N1.   The patient has done well from surgery but has had a lot of prob lems with anxiety and hot flashes but otherwise has done well. She went to see Dr. Cedeno for recommendations and then came to see us for further discussion of treatment options. In addition, the patient had BRCA testing which thankfully was negative.   T he patient had a MUGA scan, which showed an ejection fraction of 50% and no other abnormalities. Her plain films of the abnormal spots on her bone scan were negative. She had port placed and is ready to start treatment. The patient was presented at the [facility] board and the right axillary lymph nodes were felt to be insignificant and plans for dose-dense AC/Taxol followed by Herceptin for a year and radiation to be considered because of the carroll disease.   When she had CT scans of the chest, abdomen and pelvis, they were unremarkable except for hemangioma in the liver and prominent intramammary nodes in the upper outer quadrant of the left breast with several prominent axillary nodes bilaterally.   The patient started dose-dense AC with fairly good tolerance except for some mucositis after cycle 4. She had a febrile illness that required hospitalization from 01/02/2014 - 01/15/2014 with a fever of unknown origin. Multiple viral cultures were negative including CMV, EBV, herpes, respiratory panel and hepatiti s profile was negative. Mouth multiple blood and urine cultures and throat cultures were negative except for Haemophilus parainfluenza for which a course of amoxicillin was given.  During the hospitalization the patient spiked fevers continuously and then developed rapid elevation of her liver tests with a ferritin which went up as high as 44,000. The patient defervesced finally and the liver profile started coming down. Echo with bubble echo during her stay was normal with ejection fraction of 60%. The jose francisco shah was discharged home with plans to resume chemotherapy cautiously once her numbers stabilized.   The patient was seen on 01/22/2014 feeling much better. She still is not very active, feeling fatigued and deconditioned from lying in bed for almost 2 we eks. The decision was made because of borderline blood pressure of 94/70 and some mild tachycardia to have her be more active and exercise for another week and reevaluate to start Herceptin on 01/30/2014 with Taxol to be added after 2 doses of Herceptin i f she is tolerating the treatment well and her liver functions are normal. A bone marrow biopsy was also done during her hospital stay and no signs of hemophagocytosis or dysplasia although there was some aberrant myeloid antigen of uncertain significanc e which has been seen with myelodysplasia. She was also seen briefly by Dr. Fatmata Tolbert of Rheumatology while in the hospital for low positive ARTEMIO of 1:160 and anti-SSA antibody greater than 8 but Dr. Tolbert felt this was unlikely to be rheumatologica l disease because 4 doses of Cytoxan should not have precipitated this.   The patient initiated weekly Herceptin for 2 weeks with no significant problems. Taxol weekly initiated on 02/13/2014 with close followup of LFTs and cardiac function.   The patient was seen on 03/06/2014 with echo showing EF of 63%. We will continue Taxol and Herceptin weekly and watch her LFTs closely. Neurontin causes dizziness and she is not taking it. We will hold off on Effexor for the time being.   Patient required Neupogen for 3 doses because of an ANC of 1.06 with dose 4 and Neupogen x3 is added for each  dose.   The patient is seen 03/20/2014 doing fairly well. Taxol dose decreased to 15% because of neuropathy. Neupogen continued.   The patient was seen on 05/23/2014 doing well. Herceptin continued with plans to check an echocardiogram before her next treatment in 3 weeks.   MRI of the liver confirmed hemangioma. Echocardiogram is stable at 59%. Herceptin continued at 3-week intervals with the addition of Pepcid and Solu-Cortef 50 mg and the patient is almost certainly going to agree to radiation for her 3 node status, especially since the pathologist at [facility] thought there was extranodal extension.   The patient was seen 07/25/2014. Echocardiogram results noted, slightly reduced from previous, the patient proceeding through radiation therapy of left chest wall. Followup echocardiogram scheduled after her Herceptin therapy 07/25/2014.   Patient was seen on 08/14/2014 with EF of 53%. I discussed th e case with Dr. Bebe Fernandez and did not use the definitive contrast and this may explain the differences, but based on the fact that she had some near syncope on one occasion this weekend and the fact that she has had radiation to the left chest area with a significant skin burn, we will hold Herceptin today and bring her back in 4 weeks, but check her LFTs and ferritin again today.   The patient was 11/14/2014 doing well with ejection fraction of 63% and echocardiogram was reviewed. Herceptin will be continued until mid-January.   The patient is seen 12/29/2014. She had a syncopal episode 12/19/2014. ER workup was negative and neurological evaluation was ordered. Bone scan ordered for right hip pain. If this is negative she will have her port removed in J anuary and she is planning to delay reconstruction for a year.   The patient was evaluated for some pain in her back with a bone scan which was essentially unremarkable except for some degenerative changes at L3, and T6-7-8. Because we were concerned, we  ordered an MRI of the thoracic and lumbar spine which was benign with just DJD. No further followup is needed at this time with regard to scans.   Patient seen on 05/08/2015, doing well; occasional migraine-type headache persists. Port has been removed. She is thinking about reconstruction sometime at the end of the year.   CT of the abdomen done at [facility] in 10/2014 shows a liver lesion, which was indeterminate. They recommended MRI. We have compared to a previous MRI in 2014 and established this is hemangioma.   Patient seen on 12/18/2015 having had CT scans of the chest and abdo men at [facility] for some abdominal discomfort that showed a 3 cm lesion in the dome of the liver, which they were worried about and recommended an MRI, but we reviewed the scans and also compared to the previous MRI of the liver d one in 2014 in October and confirmed that this indeed was a hemangioma that has been stable since 2013. An echo was also done which showed an ejection fraction of 60% and stable.   7/17   patient is a 51-year-old  female with an ER/WA negative HER-2 positive breast cancer node positive T2 N1 M0 treated with Adriamycin and Cytoxan followed by Taxol Herceptin was completed in late 2014 .  He is here for routine follow-up and overall she is doing well she was diagnosed with asthma and  is on an inhaler with good improvement.  She has mild hot flashes but has mood swings and menopausal symptoms as reported into menopause with chemotherapy.  She has no unusual aches or pains but otherwise is doing well and working full-time.  She is not interested in reconstruction at this point.  We did discuss doing extended genetic testing because of her family history we referred her for the extended testing but she has not done that yet She did have a screening colonoscopy and this was negative.  DEXA scan was normal in April of this year.  She is still very  anxious about recurrence which is understandable but getting better with time.    0.9 g IgG kappa MGUS noted the negative skeletal survey and urine protein.  Bone marrow planned workup for recurrent syncope underway per cardiology    10/18  having intermittent episodes of syncope which sounds vasovagal the last one was in May when she was at a bar drinking with friends .    she's had an echo and a stress test and an EEG andbrain MRI  which was reportedly benign.  She is currently wearing a event monitor During the workup of these syncopal episodes the neurologist ordered an immunoelectrophoresis which showed an IgG paraprotein of 0.8 g/dL.  Need to evaluate this further but I don't think this is anything to do with her syncope unless she has an autonomic neuropathy but she was sitting down at the time she felt nausea  and had her syncopal episode that does not appear to be orthostatic  We reviewed her skeletal survey which is essentially negative her urine did not show any significant Bence Witt protein she has a 0.9 g/dL M protein with a mildly elevated light chain ratio and a mildly elevated beta-2 microglobulin of 2.5      I recommended a bone marrow biopsy and staining for amyloid and this was done and her bone marrow showed 5% plasma cells which are monotypic and have a 13q minus but no amyloid deposition-I explained that she has an MGUS but can proceed to myeloma over time especially because she has an abnormal free light chain ratio and we will watch her M protein closely  I'm not sure this explains her syncope        SOCIAL HISTORY: Single. . She does not smoke. Drinks very little. No drug history.     FAMILY HISTORY: Both of her parents are in their 60s. Mother had kidney cancer and had a partial kidney resection. Brother  of head trauma, another is healthy. She has two sisters who are healthy. She has 2 paternal aunts with breast cancer; one is alive in her 70s, the other  at 48  "of breast cancer. A paternal uncle with kidney cancer. Paternal grandfather with colon cancer in his 60s. Multiple paternal cousins with cancer inc luding leukemia and kidney cancer. No other history of breast or ovarian cancer in the family.     Review of Systems   Constitutional: Positive for fatigue (unchanged 8/20/2020).   Respiratory: Positive for chest tightness (breast and chest arms tenderness unchanged8/20/2020) and shortness of breath (asthma unchanged8/20/2020).    Gastrointestinal: Negative for constipation, diarrhea, nausea and vomiting.   Musculoskeletal: Positive for arthralgias (worse 8/20/2020), back pain (same 8/20/2020) and gait problem (left hip bone on bone worse 8/20/2020).   Skin: Positive for rash (under left arm 8/20/2020).   Allergic/Immunologic: Positive for environmental allergies (improved 8/20/2020).   Neurological: Positive for numbness (improved 8/20/2020).   Psychiatric/Behavioral: The patient is nervous/anxious (little better 8/20/2020).       A comprehensive 14 point review of systems was performed and was negative except as mentioned.    Medications:  The current medication list was reviewed in the EMR    ALLERGIES:    Allergies   Allergen Reactions   • Ciprofloxacin Hives   • Sulfa Antibiotics Hives   • Amoxicillin Nausea And Vomiting       Objective      Vitals:    08/27/21 1629   BP: 149/83   Pulse: 73   Resp: 14   Temp: 97.3 °F (36.3 °C)   TempSrc: Infrared   SpO2: 99%   Weight: 78.2 kg (172 lb 8 oz)   Height: 160 cm (62.99\")   PainSc:   3   PainLoc: Arm     Current Status 8/27/2021   ECOG score 0       Physical Exam    GENERAL:  Well-developed, well-nourished in no acute distress.   SKIN:  Warm, dry without rashes, purpura or petechiae.  Fading rash in both axilla likely heat rash  HEAD:  Normocephalic.  EYES:  Pupils equal, round and reactive to light.  EOMs intact.  Conjunctivae normal.  EARS:  Hearing intact.  NOSE:  Septum midline.  No excoriations or nasal " discharge.  MOUTH:  Tongue is well-papillated; no stomatitis or ulcers.  Lips normal.  THROAT:  Oropharynx without lesions or exudates.  NECK:  Supple with good range of motion; no thyromegaly or masses, no JVD.  LYMPHATICS:  No cervical, supraclavicular, axillary or inguinal adenopathy.  CHEST:  Lungs clear to percussion and auscultation. Good airflow.  BREASTS: Bilateral chest wall is benign with no axillary adenopathy-  CARDIAC:  Regular rate and rhythm without murmurs, rubs or gallops. Normal S1,S2.  ABDOMEN:  Soft, nontender with no organomegaly or masses.  EXTREMITIES:  No clubbing, cyanosis or edema.  Mild lymphedema left forearm  NEUROLOGICAL:  Cranial Nerves II-XII grossly intact.  No focal neurological deficits.  PSYCHIATRIC:  Normal affect and mood.        RECENT LABS:  Lab Results   Component Value Date    WBC 5.22 08/20/2021    HGB 11.8 (L) 08/20/2021    HCT 35.9 08/20/2021    MCV 87.1 08/20/2021     08/20/2021         Assessment/Plan   1.  T2 N1 ER/MI negative HER-2 positive left breast cancer 2013 treated with Adriamycin Cytoxan Taxol Herceptin and bilateral mastectomies plus radiation 4.5 Years from diagnosis  2.  BRCA negative VUS ABRAXAS1  3.  Hemangioma of the liver  4.  family history of malignancy  5.  Mild chronic anemia iron normal -scope 8/19 neg  6.  Anxiety intolerant of Effexor ?  Zyprexa 2.5 mg   7. Recurrent syncope without obvious precipitating cause workup underway  8.  Incidental finding of a monoclonal gammopathy IgG kappa skeletal survey negative bone marrow 8/18  showing 5% plasma cells with no amyloid  9.  Left neck pain and radicular symptoms?  Degenerative disease    Plan  1.return for myeloma parameters in 6 months  2.  see me in 12 months   3.  MRI C-spine call for the results  4.  Strongly encouraged COVID-19 vaccination  I spent >40 total minutes, face-to-face, caring for Santos today.  Greater than 50% of this time involved counseling and/or coordination of care as  documented within this note.        8/27/2021      CC:

## 2021-09-01 ENCOUNTER — APPOINTMENT (OUTPATIENT)
Dept: MRI IMAGING | Facility: HOSPITAL | Age: 56
End: 2021-09-01

## 2022-02-02 ENCOUNTER — HOSPITAL ENCOUNTER (OUTPATIENT)
Dept: GENERAL RADIOLOGY | Facility: HOSPITAL | Age: 57
Discharge: HOME | End: 2022-02-02
Admitting: PHYSICAL MEDICINE & REHABILITATION

## 2022-02-02 DIAGNOSIS — G89.18 POST-MASTECTOMY PAIN: ICD-10-CM

## 2022-02-02 PROCEDURE — 71111 X-RAY EXAM RIBS/CHEST4/> VWS: CPT

## 2022-02-11 ENCOUNTER — LAB (OUTPATIENT)
Dept: LAB | Facility: HOSPITAL | Age: 57
End: 2022-02-11

## 2022-02-11 DIAGNOSIS — Z17.0 MALIGNANT NEOPLASM OF UPPER-OUTER QUADRANT OF BOTH BREASTS IN FEMALE, ESTROGEN RECEPTOR POSITIVE: ICD-10-CM

## 2022-02-11 DIAGNOSIS — C50.412 MALIGNANT NEOPLASM OF UPPER-OUTER QUADRANT OF BOTH BREASTS IN FEMALE, ESTROGEN RECEPTOR POSITIVE: ICD-10-CM

## 2022-02-11 DIAGNOSIS — C50.411 MALIGNANT NEOPLASM OF UPPER-OUTER QUADRANT OF BOTH BREASTS IN FEMALE, ESTROGEN RECEPTOR POSITIVE: ICD-10-CM

## 2022-02-11 LAB
ALBUMIN SERPL-MCNC: 4.4 G/DL (ref 3.5–5.2)
ALBUMIN/GLOB SERPL: 1.4 G/DL
ALP SERPL-CCNC: 64 U/L (ref 39–117)
ALT SERPL W P-5'-P-CCNC: 20 U/L (ref 1–33)
ANION GAP SERPL CALCULATED.3IONS-SCNC: 12 MMOL/L (ref 5–15)
AST SERPL-CCNC: 27 U/L (ref 1–32)
BASOPHILS # BLD AUTO: 0.02 10*3/MM3 (ref 0–0.2)
BASOPHILS NFR BLD AUTO: 0.4 % (ref 0–1.5)
BILIRUB SERPL-MCNC: 0.3 MG/DL (ref 0–1.2)
BUN SERPL-MCNC: 20 MG/DL (ref 6–20)
BUN/CREAT SERPL: 21.3 (ref 7–25)
CALCIUM SPEC-SCNC: 9 MG/DL (ref 8.6–10.5)
CHLORIDE SERPL-SCNC: 103 MMOL/L (ref 98–107)
CO2 SERPL-SCNC: 24 MMOL/L (ref 22–29)
CREAT SERPL-MCNC: 0.94 MG/DL (ref 0.57–1)
DEPRECATED RDW RBC AUTO: 47.3 FL (ref 37–54)
EOSINOPHIL # BLD AUTO: 0.05 10*3/MM3 (ref 0–0.4)
EOSINOPHIL NFR BLD AUTO: 0.9 % (ref 0.3–6.2)
ERYTHROCYTE [DISTWIDTH] IN BLOOD BY AUTOMATED COUNT: 14.6 % (ref 12.3–15.4)
GFR SERPL CREATININE-BSD FRML MDRD: 75 ML/MIN/1.73
GLOBULIN UR ELPH-MCNC: 3.1 GM/DL
GLUCOSE SERPL-MCNC: 103 MG/DL (ref 65–99)
HCT VFR BLD AUTO: 32.6 % (ref 34–46.6)
HGB BLD-MCNC: 10.8 G/DL (ref 12–15.9)
IMM GRANULOCYTES # BLD AUTO: 0.01 10*3/MM3 (ref 0–0.05)
IMM GRANULOCYTES NFR BLD AUTO: 0.2 % (ref 0–0.5)
LYMPHOCYTES # BLD AUTO: 1.79 10*3/MM3 (ref 0.7–3.1)
LYMPHOCYTES NFR BLD AUTO: 32.6 % (ref 19.6–45.3)
MCH RBC QN AUTO: 29.1 PG (ref 26.6–33)
MCHC RBC AUTO-ENTMCNC: 33.1 G/DL (ref 31.5–35.7)
MCV RBC AUTO: 87.9 FL (ref 79–97)
MONOCYTES # BLD AUTO: 0.47 10*3/MM3 (ref 0.1–0.9)
MONOCYTES NFR BLD AUTO: 8.6 % (ref 5–12)
NEUTROPHILS NFR BLD AUTO: 3.15 10*3/MM3 (ref 1.7–7)
NEUTROPHILS NFR BLD AUTO: 57.3 % (ref 42.7–76)
NRBC BLD AUTO-RTO: 0 /100 WBC (ref 0–0.2)
PLATELET # BLD AUTO: 231 10*3/MM3 (ref 140–450)
PMV BLD AUTO: 9 FL (ref 6–12)
POTASSIUM SERPL-SCNC: 4 MMOL/L (ref 3.5–5.2)
PROT SERPL-MCNC: 7.5 G/DL (ref 6–8.5)
RBC # BLD AUTO: 3.71 10*6/MM3 (ref 3.77–5.28)
SODIUM SERPL-SCNC: 139 MMOL/L (ref 136–145)
WBC NRBC COR # BLD: 5.49 10*3/MM3 (ref 3.4–10.8)

## 2022-02-11 PROCEDURE — 36415 COLL VENOUS BLD VENIPUNCTURE: CPT

## 2022-02-11 PROCEDURE — 85025 COMPLETE CBC W/AUTO DIFF WBC: CPT

## 2022-02-11 PROCEDURE — 80053 COMPREHEN METABOLIC PANEL: CPT | Performed by: INTERNAL MEDICINE

## 2022-02-14 LAB
ALBUMIN SERPL ELPH-MCNC: 3.8 G/DL (ref 2.9–4.4)
ALBUMIN/GLOB SERPL: 1.2 {RATIO} (ref 0.7–1.7)
ALPHA1 GLOB SERPL ELPH-MCNC: 0.2 G/DL (ref 0–0.4)
ALPHA2 GLOB SERPL ELPH-MCNC: 0.7 G/DL (ref 0.4–1)
B-GLOBULIN SERPL ELPH-MCNC: 1 G/DL (ref 0.7–1.3)
GAMMA GLOB SERPL ELPH-MCNC: 1.5 G/DL (ref 0.4–1.8)
GLOBULIN SER-MCNC: 3.3 G/DL (ref 2.2–3.9)
IGA SERPL-MCNC: 122 MG/DL (ref 87–352)
IGG SERPL-MCNC: 1659 MG/DL (ref 586–1602)
IGM SERPL-MCNC: 33 MG/DL (ref 26–217)
INTERPRETATION SERPL IEP-IMP: ABNORMAL
KAPPA LC FREE SER-MCNC: 70.5 MG/L (ref 3.3–19.4)
KAPPA LC FREE/LAMBDA FREE SER: 7.92 {RATIO} (ref 0.26–1.65)
LABORATORY COMMENT REPORT: ABNORMAL
LAMBDA LC FREE SERPL-MCNC: 8.9 MG/L (ref 5.7–26.3)
M PROTEIN SERPL ELPH-MCNC: 1 G/DL
PROT SERPL-MCNC: 7.1 G/DL (ref 6–8.5)

## 2022-02-23 ENCOUNTER — TELEPHONE (OUTPATIENT)
Dept: ONCOLOGY | Facility: CLINIC | Age: 57
End: 2022-02-23

## 2022-02-23 NOTE — TELEPHONE ENCOUNTER
Patient is calling to discuss lab results from 2/11/2022.  She had not heard back from anyone yet and was wondering.  Please advise.

## 2022-02-23 NOTE — TELEPHONE ENCOUNTER
Caller: Santos Witt    Relationship: Self    Best call back number: [phone]    What is the best time to reach you: ANYTIME     CAN LEAVE VM ON THAT LINE IF UNABLE TO REACH WITH NAME AND NUMBER TO CONTACT BACK .    Who are you requesting to speak with (clinical staff, provider,  specific staff member):   DR GARCIA OR NURSE     Do you know the name of the person who called: SANTOS    What was the call regarding:   NEEDING SOMEONE TO GO OVER LAB RESULTS FROM 02/11, NORMALLY SOMEONE ALWAYS CALLS TO GO OVER THOSE HAD NOT HEARD FROM ANYONE YET.       Do you require a callback: YES

## 2022-05-11 ENCOUNTER — TELEPHONE (OUTPATIENT)
Dept: CARDIOLOGY | Facility: CLINIC | Age: 57
End: 2022-05-11

## 2022-08-16 ENCOUNTER — TELEPHONE (OUTPATIENT)
Dept: ONCOLOGY | Facility: CLINIC | Age: 57
End: 2022-08-16

## 2022-08-16 NOTE — TELEPHONE ENCOUNTER
Returned call to patient who is scheduled for an MRI of her cervical spine for tomorrow at [facility].  She is wondering since she has had ongoing neck and low back pain if an additional MRI of her thoracic and lumbar spine could be added to the current order and possibly added tomorrow to her scans.  I explained that Dr. Stinson is out of the office today and I explained that it is probably too late to get those added on.  The other problem would be getting the scans authorized.  I explained that I will send to nurse covering and we can check with Dr. Stinson in the am.  I explained that she should go forward with the MRI of her cervical spine and the additional MRI's being requested would need to be discussed with Dr. Stinson and ordered if appropriate and scheduled for another day.  Patient v/u.

## 2022-08-17 ENCOUNTER — APPOINTMENT (OUTPATIENT)
Dept: MRI IMAGING | Facility: HOSPITAL | Age: 57
End: 2022-08-17

## 2022-08-17 NOTE — TELEPHONE ENCOUNTER
Left message for patient informing her that Dr. Stinson does not necessarily recommend a complete spine MRI at this time, and advised her that this could be discussed in more detail at her office visit next week. Informed patient no callback was necessary, unless she had additional questions.

## 2022-08-19 ENCOUNTER — LAB (OUTPATIENT)
Dept: LAB | Facility: HOSPITAL | Age: 57
End: 2022-08-19

## 2022-08-19 DIAGNOSIS — C50.411 MALIGNANT NEOPLASM OF UPPER-OUTER QUADRANT OF BOTH BREASTS IN FEMALE, ESTROGEN RECEPTOR POSITIVE: ICD-10-CM

## 2022-08-19 DIAGNOSIS — Z17.0 MALIGNANT NEOPLASM OF UPPER-OUTER QUADRANT OF BOTH BREASTS IN FEMALE, ESTROGEN RECEPTOR POSITIVE: ICD-10-CM

## 2022-08-19 DIAGNOSIS — C50.412 MALIGNANT NEOPLASM OF UPPER-OUTER QUADRANT OF BOTH BREASTS IN FEMALE, ESTROGEN RECEPTOR POSITIVE: ICD-10-CM

## 2022-08-19 LAB
ALBUMIN SERPL-MCNC: 4.3 G/DL (ref 3.5–5.2)
ALBUMIN/GLOB SERPL: 1.2 G/DL (ref 1.1–2.4)
ALP SERPL-CCNC: 69 U/L (ref 38–116)
ALT SERPL W P-5'-P-CCNC: 14 U/L (ref 0–33)
ANION GAP SERPL CALCULATED.3IONS-SCNC: 11 MMOL/L (ref 5–15)
AST SERPL-CCNC: 25 U/L (ref 0–32)
BASOPHILS # BLD AUTO: 0.02 10*3/MM3 (ref 0–0.2)
BASOPHILS NFR BLD AUTO: 0.4 % (ref 0–1.5)
BILIRUB SERPL-MCNC: 0.3 MG/DL (ref 0.2–1.2)
BUN SERPL-MCNC: 22 MG/DL (ref 6–20)
BUN/CREAT SERPL: 26.5 (ref 7.3–30)
CALCIUM SPEC-SCNC: 9.3 MG/DL (ref 8.5–10.2)
CHLORIDE SERPL-SCNC: 103 MMOL/L (ref 98–107)
CO2 SERPL-SCNC: 22 MMOL/L (ref 22–29)
CREAT SERPL-MCNC: 0.83 MG/DL (ref 0.6–1.1)
DEPRECATED RDW RBC AUTO: 50 FL (ref 37–54)
EGFRCR SERPLBLD CKD-EPI 2021: 82.3 ML/MIN/1.73
EOSINOPHIL # BLD AUTO: 0.05 10*3/MM3 (ref 0–0.4)
EOSINOPHIL NFR BLD AUTO: 1 % (ref 0.3–6.2)
ERYTHROCYTE [DISTWIDTH] IN BLOOD BY AUTOMATED COUNT: 14.9 % (ref 12.3–15.4)
GLOBULIN UR ELPH-MCNC: 3.7 GM/DL (ref 1.8–3.5)
GLUCOSE SERPL-MCNC: 83 MG/DL (ref 74–124)
HCT VFR BLD AUTO: 34.2 % (ref 34–46.6)
HGB BLD-MCNC: 11.2 G/DL (ref 12–15.9)
IMM GRANULOCYTES # BLD AUTO: 0.01 10*3/MM3 (ref 0–0.05)
IMM GRANULOCYTES NFR BLD AUTO: 0.2 % (ref 0–0.5)
LYMPHOCYTES # BLD AUTO: 2.23 10*3/MM3 (ref 0.7–3.1)
LYMPHOCYTES NFR BLD AUTO: 43.5 % (ref 19.6–45.3)
MCH RBC QN AUTO: 29.7 PG (ref 26.6–33)
MCHC RBC AUTO-ENTMCNC: 32.7 G/DL (ref 31.5–35.7)
MCV RBC AUTO: 90.7 FL (ref 79–97)
MONOCYTES # BLD AUTO: 0.5 10*3/MM3 (ref 0.1–0.9)
MONOCYTES NFR BLD AUTO: 9.7 % (ref 5–12)
NEUTROPHILS NFR BLD AUTO: 2.32 10*3/MM3 (ref 1.7–7)
NEUTROPHILS NFR BLD AUTO: 45.2 % (ref 42.7–76)
NRBC BLD AUTO-RTO: 0 /100 WBC (ref 0–0.2)
PLATELET # BLD AUTO: 241 10*3/MM3 (ref 140–450)
PMV BLD AUTO: 9 FL (ref 6–12)
POTASSIUM SERPL-SCNC: 3.7 MMOL/L (ref 3.5–4.7)
PROT SERPL-MCNC: 8 G/DL (ref 6.3–8)
RBC # BLD AUTO: 3.77 10*6/MM3 (ref 3.77–5.28)
SODIUM SERPL-SCNC: 136 MMOL/L (ref 134–145)
WBC NRBC COR # BLD: 5.13 10*3/MM3 (ref 3.4–10.8)

## 2022-08-19 PROCEDURE — 36415 COLL VENOUS BLD VENIPUNCTURE: CPT

## 2022-08-19 PROCEDURE — 80053 COMPREHEN METABOLIC PANEL: CPT

## 2022-08-19 PROCEDURE — 85025 COMPLETE CBC W/AUTO DIFF WBC: CPT

## 2022-08-22 LAB
ALBUMIN SERPL ELPH-MCNC: 4.1 G/DL (ref 2.9–4.4)
ALBUMIN/GLOB SERPL: 1.2 {RATIO} (ref 0.7–1.7)
ALPHA1 GLOB SERPL ELPH-MCNC: 0.2 G/DL (ref 0–0.4)
ALPHA2 GLOB SERPL ELPH-MCNC: 0.6 G/DL (ref 0.4–1)
B-GLOBULIN SERPL ELPH-MCNC: 0.9 G/DL (ref 0.7–1.3)
GAMMA GLOB SERPL ELPH-MCNC: 1.8 G/DL (ref 0.4–1.8)
GLOBULIN SER-MCNC: 3.5 G/DL (ref 2.2–3.9)
IGA SERPL-MCNC: 116 MG/DL (ref 87–352)
IGG SERPL-MCNC: 1903 MG/DL (ref 586–1602)
IGM SERPL-MCNC: 48 MG/DL (ref 26–217)
INTERPRETATION SERPL IEP-IMP: ABNORMAL
KAPPA LC FREE SER-MCNC: 70.3 MG/L (ref 3.3–19.4)
KAPPA LC FREE/LAMBDA FREE SER: 3.48 {RATIO} (ref 0.26–1.65)
LABORATORY COMMENT REPORT: ABNORMAL
LAMBDA LC FREE SERPL-MCNC: 20.2 MG/L (ref 5.7–26.3)
M PROTEIN SERPL ELPH-MCNC: 1.1 G/DL
PROT SERPL-MCNC: 7.6 G/DL (ref 6–8.5)

## 2022-08-25 ENCOUNTER — APPOINTMENT (OUTPATIENT)
Dept: LAB | Facility: HOSPITAL | Age: 57
End: 2022-08-25

## 2022-08-25 ENCOUNTER — OFFICE VISIT (OUTPATIENT)
Dept: ONCOLOGY | Facility: CLINIC | Age: 57
End: 2022-08-25

## 2022-08-25 VITALS
TEMPERATURE: 97.1 F | RESPIRATION RATE: 8 BRPM | OXYGEN SATURATION: 98 % | BODY MASS INDEX: 28 KG/M2 | HEIGHT: 63 IN | HEART RATE: 67 BPM | SYSTOLIC BLOOD PRESSURE: 153 MMHG | WEIGHT: 158 LBS | DIASTOLIC BLOOD PRESSURE: 105 MMHG

## 2022-08-25 DIAGNOSIS — C50.411 MALIGNANT NEOPLASM OF UPPER-OUTER QUADRANT OF BOTH BREASTS IN FEMALE, ESTROGEN RECEPTOR POSITIVE: Primary | ICD-10-CM

## 2022-08-25 DIAGNOSIS — C50.412 MALIGNANT NEOPLASM OF UPPER-OUTER QUADRANT OF BOTH BREASTS IN FEMALE, ESTROGEN RECEPTOR POSITIVE: Primary | ICD-10-CM

## 2022-08-25 DIAGNOSIS — Z17.0 MALIGNANT NEOPLASM OF UPPER-OUTER QUADRANT OF BOTH BREASTS IN FEMALE, ESTROGEN RECEPTOR POSITIVE: Primary | ICD-10-CM

## 2022-08-25 DIAGNOSIS — D47.2 MGUS (MONOCLONAL GAMMOPATHY OF UNKNOWN SIGNIFICANCE): ICD-10-CM

## 2022-08-25 PROCEDURE — 99214 OFFICE O/P EST MOD 30 MIN: CPT | Performed by: INTERNAL MEDICINE

## 2022-08-25 RX ORDER — MOMETASONE FUROATE 50 UG/1
SPRAY, METERED NASAL
COMMUNITY
Start: 2021-08-25

## 2022-08-25 NOTE — PROGRESS NOTES
Subjective      REASONS FOR FOLLOWUP:   1. T2N1M0, ER/OK negative, HER2 positive breast cancer.   2. BRCA 1 and 2 negative.   3. Adriamycin/Cytoxan followed by Taxol and Herceptin, started on 11/08/2013.   4. Syncopal episode after cycle #3 with stable echo, chemotherapy continued.   5. FUO after cycle 4 of chemotherapy associated with severe mouth pain, marked elevation of liver function tests and ferritin to 44,000, stable echocardiogram, stable cardiac function, ?etiology, and query drug reaction versus viral infection.   6. Taxol weekly initiated on 02/13/2014. Close followup of LFTs and cardiac function.   7. Echocardiogram 03/05/2014 stable at 63%. Treatment continued with Taxol and Herceptin.   8. Neuropathy grade 2. Taxol decreased 15% on 03/20/2014.   9. Q.3 week Herceptin started 05/23/2014 with plans for radiation consult.   10. Itching with Herceptin. Hydrocortisone and Pepcid added to care plan.   11. Patient seen 07/25/2014 with mild reduction in ejection fraction noted. Fo llowup echocardiogram scheduled prior to next visit month, Herceptin continued with hydrocortisone and Pepcid pre-medications.   12. Further drop in the ejection fraction to 53%. Because of some dizziness and near syncope, treatment with Herceptin held with planned followup in 1 month with repeat echocardiogram. MRI done for headaches was negative.   13. EF up to 64%. Herceptin resumed. Previous echocardiogram felt to be suboptimal due to lack of contrast.   14. Syncopal episode 12/19/2014 with a negative cardiac evaluation. Neurology consult recommended.   15. MRI done for headaches. Negative in August 2014.   16. CT of the abdomen done at  in 10/2014 shows a liver lesion, which was indeterminate. They recommended MRI. We have compared to a previous MRI in 2014 and established this is hemangioma.                     17. MGUS Recurrent syncope workup found incidental IgG kappa                               monoclonal  gammopathy  Workup negative negative         History of Present Illness patient is a 56-year-old lady with a node positive HER-2 positive ER negative breast cancer 9  years from completion of adjuvant chemotherapy.      She comes in today for follow-up and overall she is doing well but   She also has an incidentally found IgG MGUS which we are following and appears to been fairly stable now for the last 24 months with no bone marrow evidence of myeloma or excess plasma cells.  Her M protein is 1.1 g which is the same as it was last 2 year and her free light chain ratio is abnormal but stable I think we can continue 6-month checks for the time being    She had a hip replaced and is doing well from that    She is having some issues with lymphedema in her left hand and also some pain in her left neck with radicular type symptoms into her left arm and I have asked for a C-spine MRI to evaluate this but this is almost certainly benign and degenerative-C-spine MRI does show significant degenerative disease and I think in referral to neurosurgery and physical therapy is indicated    Her CBC stable except for mild anemia and she has had this in the past and we will keep an eye on it       Active Ambulatory Problems     Diagnosis Date Noted   • Bilateral malignant neoplasm of upper outer quadrant of breast in female (HCC) 04/15/2016   • Chronic pain of left knee 01/11/2018   • Acute pain of left knee 01/11/2018   • Contusion of left knee 01/11/2018   • Arthritis of left knee 02/09/2018   • Chondromalacia, patella, left 02/09/2018   • MGUS (monoclonal gammopathy of unknown significance) 06/08/2018   • Arthritis of left hip 01/16/2019   • Left hip pain 01/16/2019   • Status post administration of cardiotoxic chemotherapy 06/23/2021   • Precordial pain 06/23/2021     Resolved Ambulatory Problems     Diagnosis Date Noted   • No Resolved Ambulatory Problems     Past Medical History:   Diagnosis Date   •  Acquired hypothyroidism    • Anemia    • Arthritis    • Asthma    • Cancer (CMS/HCC)    • Chest pain    • H/O Chondromalacia patellae, left    • H/O Regurgitation, mitral/triscupid, mild    • H/O Syncope w/collapse    • Heart murmur    • History of chronic fatigue    • MVP (mitral valve prolapse)      SURGICAL HISTORY: Fibroadenoma from the left breast in . Uterine fibroids removed laparoscopically in . Left knee arthroscopy in  and hysterectomy in . Ovaries were left in place.     GYN HISTORY: Menarche age 13.  0. She obviously stopped menstruating at the time of her hysterectomy in  and is having hot flashes.     HEMATOLOGIC/ONCOLOGIC HISTORY:    The  patient initially seen on 10/18/2013, a 48-year-old  female who works as a  at the [employer] ' s office who has a long history of fibrocystic breast disease with multiple ultrasounds and mammograms in the past to evaluate c ysts but after her most recent mammogram, because of some abnormalities, an ultrasound was recommended on 2013 at [facility]. The targeted ultrasound showed 3 circumscribed solid masses in the left breast at the 2 o' clock position, axillar y tail of the breast. The appearance was suggestive of intramammary lymph nodes without a definite fatty hilum which was worrisome for tumor involvement. One of the 3 masses had actually increased in size compared to 2 of the other lesions which were un changed from 2010 suggesting benign etiology. Because of the greater than 20% interval increase in size of one of these nodules since the last mammogram, ultrasound guided biopsy was recommended.   Ultrasound-guided biopsy was done on 2013 and this biopsy showed findings highly suspicious for lymph node involvement by ductal carcinoma and the patient then went to see Dr. Witt who sent her for another biopsy on 2013 and a 1.5 cm mass was seen at the 3 o' clock position  of the left breast in luan tion to the 3 abnormal axillary masses which were felt to be lymph nodes. This area was biopsied on 08/19/2013 and the path report showed invasive ductal carcinoma and DCIS grade 3 with the largest focus of invasive cancer being 1 cm and DCIS was interme d iate to high grade. The left axillary lymph node was core biopsied and confirmed metastatic carcinoma. ER/IL were negative, HER2 was 3+ positive. This led to an MRI of the breast 08/27/2013 which showed 3 cm mass in the lower outer quadrant of the left breast and additional clump enhancement in the middle and anterior third of the left breast at the 3 o' clock axis suspicious for in situ carcinoma and 3 rounded masses measured up to 9 mm in greatest dimension are noted consistent with lymph nodes. One ha d a clip from the recent biopsy. Right breast showed stippled foci of variable enhancement with no dominant or clumped findings, prominent right axillary lymph nodes were noted and right axillary lymph node biopsy was done. This was nondiagnostic.   The patient underwent bone scan on 09/09/2013 which was unremarkable except for some increased activity at T9 and the right part of L3 which was again felt to be nonspecific but plain films were recommended. CT scan of the chest, abdomen and pelvis was done which was also unremarkable except for prominent intramammary nodes in the upper outer left breast and several prominent axillary lymph nodes bilaterally one of which has been biopsied on the right. Appearance raised the concern of possible metastatic to both axillae but there were no other sites of involvement. Bone density dated 09/20/2013 was normal. Patient then went for surgery on 09/25/2013 at which time she underwent right mastectomy which was unremarkable and left total mastectomy with axillary dissection which showed a 2.9 cm grade 3 infiltrating ductal carcinoma with associated DCIS, margins were uninvolved. Three of 21 lymph  nodes showed metastatic disease making this a pT2N1.   The patient has done well from surgery but has had a lot of prob lems with anxiety and hot flashes but otherwise has done well. She went to see Dr. Cedeno for recommendations and then came to see us for further discussion of treatment options. In addition, the patient had BRCA testing which thankfully was negative.   T he patient had a MUGA scan, which showed an ejection fraction of 50% and no other abnormalities. Her plain films of the abnormal spots on her bone scan were negative. She had port placed and is ready to start treatment. The patient was presented at the [facility] board and the right axillary lymph nodes were felt to be insignificant and plans for dose-dense AC/Taxol followed by Herceptin for a year and radiation to be considered because of the carroll disease.   When she had CT scans of the chest, abdomen and pelvis, they were unremarkable except for hemangioma in the liver and prominent intramammary nodes in the upper outer quadrant of the left breast with several prominent axillary nodes bilaterally.   The patient started dose-dense AC with fairly good tolerance except for some mucositis after cycle 4. She had a febrile illness that required hospitalization from 01/02/2014 - 01/15/2014 with a fever of unknown origin. Multiple viral cultures were negative including CMV, EBV, herpes, respiratory panel and hepatiti s profile was negative. Mouth multiple blood and urine cultures and throat cultures were negative except for Haemophilus parainfluenza for which a course of amoxicillin was given. During the hospitalization the patient spiked fevers continuously and then developed rapid elevation of her liver tests with a ferritin which went up as high as 44,000. The patient defervesced finally and the liver profile started coming down. Echo with bubble echo during her stay was normal with ejection fraction of 60%. The p atient was discharged home with plans  to resume chemotherapy cautiously once her numbers stabilized.   The patient was seen on 01/22/2014 feeling much better. She still is not very active, feeling fatigued and deconditioned from lying in bed for almost 2 we eks. The decision was made because of borderline blood pressure of 94/70 and some mild tachycardia to have her be more active and exercise for another week and reevaluate to start Herceptin on 01/30/2014 with Taxol to be added after 2 doses of Herceptin i f she is tolerating the treatment well and her liver functions are normal. A bone marrow biopsy was also done during her hospital stay and no signs of hemophagocytosis or dysplasia although there was some aberrant myeloid antigen of uncertain significanc e which has been seen with myelodysplasia. She was also seen briefly by Dr. Fatmata Tolbert of Rheumatology while in the hospital for low positive ARTEMIO of 1:160 and anti-SSA antibody greater than 8 but Dr. Tolbert felt this was unlikely to be rheumatologica l disease because 4 doses of Cytoxan should not have precipitated this.   The patient initiated weekly Herceptin for 2 weeks with no significant problems. Taxol weekly initiated on 02/13/2014 with close followup of LFTs and cardiac function.   The patient was seen on 03/06/2014 with echo showing EF of 63%. We will continue Taxol and Herceptin weekly and watch her LFTs closely. Neurontin causes dizziness and she is not taking it. We will hold off on Effexor for the time being.   Patient required Neupogen for 3 doses because of an ANC of 1.06 with dose 4 and Neupogen x3 is added for each dose.   The patient is seen 03/20/2014 doing fairly well. Taxol dose decreased to 15% because of neuropathy. Neupogen continued.   The patient was seen on 05/23/2014 doing well. Herceptin continued with plans to check an echocardiogram before her next treatment in 3 weeks.   MRI of the liver confirmed hemangioma. Echocardiogram is stable at 59%. Herceptin continued  at 3-week intervals with the addition of Pepcid and Solu-Cortef 50 mg and the patient is almost certainly going to agree to radiation for her 3 node status, especially since the pathologist at [facility] thought there was extranodal extension.   The patient was seen 07/25/2014. Echocardiogram results noted, slightly reduced from previous, the patient proceeding through radiation therapy of left chest wall. Followup echocardiogram scheduled after her Herceptin therapy 07/25/2014.   Patient was seen on 08/14/2014 with EF of 53%. I discussed th e case with Dr. Bebe Fernandez and did not use the definitive contrast and this may explain the differences, but based on the fact that she had some near syncope on one occasion this weekend and the fact that she has had radiation to the left chest area with a significant skin burn, we will hold Herceptin today and bring her back in 4 weeks, but check her LFTs and ferritin again today.   The patient was 11/14/2014 doing well with ejection fraction of 63% and echocardiogram was reviewed. Herceptin will be continued until mid-January.   The patient is seen 12/29/2014. She had a syncopal episode 12/19/2014. ER workup was negative and neurological evaluation was ordered. Bone scan ordered for right hip pain. If this is negative she will have her port removed in J anuary and she is planning to delay reconstruction for a year.   The patient was evaluated for some pain in her back with a bone scan which was essentially unremarkable except for some degenerative changes at L3, and T6-7-8. Because we were concerned, we ordered an MRI of the thoracic and lumbar spine which was benign with just DJD. No further followup is needed at this time with regard to scans.   Patient seen on 05/08/2015, doing well; occasional migraine-type headache persists. Port has been removed. She is thinking about reconstruction sometime at the end of the year.   CT of the abdomen done at [facility] in 10/2014 shows a liver lesion, which was indeterminate. They recommended MRI. We have compared to a previous MRI in 2014 and established this is hemangioma.   Patient seen on 12/18/2015 having had CT scans of the chest and abdo men at [facility] for some abdominal discomfort that showed a 3 cm lesion in the dome of the liver, which they were worried about and recommended an MRI, but we reviewed the scans and also compared to the previous MRI of the liver d one in 2014 in October and confirmed that this indeed was a hemangioma that has been stable since 2013. An echo was also done which showed an ejection fraction of 60% and stable.   7/17   patient is a 51-year-old  female with an ER/[place] negative HER-2 positive breast cancer node positive T2 N1 M0 treated with Adriamycin and Cytoxan followed by Taxol Herceptin was completed in late 2014 .  He is here for routine follow-up and overall she is doing well she was diagnosed with asthma and  is on an inhaler with good improvement.  She has mild hot flashes but has mood swings and menopausal symptoms as reported into menopause with chemotherapy.  She has no unusual aches or pains but otherwise is doing well and working full-time.  She is not interested in reconstruction at this point.  We did discuss doing extended genetic testing because of her family history we referred her for the extended testing but she has not done that yet She did have a screening colonoscopy and this was negative.  DEXA scan was normal in April of this year.  She is still very anxious about recurrence which is understandable but getting better with time.  7/18  0.9 g IgG kappa MGUS noted the negative skeletal survey and urine protein.  Bone marrow planned workup for recurrent syncope underway per cardiology    10/18  having intermittent episodes of syncope which sounds vasovagal the last one was in May when she was at a bar drinking with  friends .    she's had an echo and a stress test and an EEG andbrain MRI  which was reportedly benign.  She is currently wearing a event monitor During the workup of these syncopal episodes the neurologist ordered an immunoelectrophoresis which showed an IgG paraprotein of 0.8 g/dL.  Need to evaluate this further but I don't think this is anything to do with her syncope unless she has an autonomic neuropathy but she was sitting down at the time she felt nausea  and had her syncopal episode that does not appear to be orthostatic  We reviewed her skeletal survey which is essentially negative her urine did not show any significant Bence Witt protein she has a 0.9 g/dL M protein with a mildly elevated light chain ratio and a mildly elevated beta-2 microglobulin of 2.5      I recommended a bone marrow biopsy and staining for amyloid and this was done and her bone marrow showed 5% plasma cells which are monotypic and have a 13q minus but no amyloid deposition-I explained that she has an MGUS but can proceed to myeloma over time especially because she has an abnormal free light chain ratio and we will watch her M protein closely  I'm not sure this explains her syncope        SOCIAL HISTORY: Single. . She does not smoke. Drinks very little. No drug history.     FAMILY HISTORY: Both of her parents are in their 60s. Mother had kidney cancer and had a partial kidney resection. Brother  of head trauma, another is healthy. She has two sisters who are healthy. She has 2 paternal aunts with breast cancer; one is alive in her 70s, the other  at 48 of breast cancer. A paternal uncle with kidney cancer. Paternal grandfather with colon cancer in his 60s. Multiple paternal cousins with cancer inc luding leukemia and kidney cancer. No other history of breast or ovarian cancer in the family.     Review of Systems   Constitutional: Positive for fatigue.   Respiratory: Negative for chest tightness.   "  Gastrointestinal: Negative for constipation, diarrhea, nausea and vomiting.   Musculoskeletal: Positive for back pain (same 8/20/2020) and neck pain. Negative for arthralgias and gait problem.   Skin: Negative for rash.   Allergic/Immunologic: Negative for environmental allergies.   Neurological: Negative for numbness (Chronic from chemo).   Psychiatric/Behavioral: Nervous/anxious: little better 8/20/2020.       A comprehensive 14 point review of systems was performed and was negative except as mentioned.    Medications:  The current medication list was reviewed in the EMR    ALLERGIES:    Allergies   Allergen Reactions   • Ciprofloxacin Hives   • Sulfa Antibiotics Hives   • Amoxicillin Nausea And Vomiting       Objective      Vitals:    08/25/22 1428   Height: 160 cm (62.99\")     Current Status 8/27/2021   ECOG score 0       Physical Exam    GENERAL:  Well-developed, well-nourished in no acute distress.   SKIN:  Warm, dry without rashes, purpura or petechiae.   HEAD:  Normocephalic.  EYES:  Pupils equal, round and reactive to light.  EOMs intact.  Conjunctivae normal.  EARS:  Hearing intact.  NOSE:  Septum midline.  No excoriations or nasal discharge.  MOUTH:  Tongue is well-papillated; no stomatitis or ulcers.  Lips normal.  THROAT:  Oropharynx without lesions or exudates.  NECK:  Supple with good range of motion; no thyromegaly or masses, no JVD.  LYMPHATICS:  No cervical, supraclavicular, axillary or inguinal adenopathy.  CHEST:  Lungs clear to percussion and auscultation. Good airflow.  BREASTS: Bilateral chest wall is benign with no axillary adenopathy-  CARDIAC:  Regular rate and rhythm without murmurs, rubs or gallops. Normal S1,S2.  ABDOMEN:  Soft, nontender with no organomegaly or masses.  EXTREMITIES:  No clubbing, cyanosis or edema.  Mild lymphedema left forearm  NEUROLOGICAL:  Cranial Nerves II-XII grossly intact.  No focal neurological deficits.  PSYCHIATRIC:  Normal affect and mood.    I have " reexamined the patient and the results are consistent with the previously documented exam. Jorge Stinson MD       RECENT LABS:      Assessment & Plan   1.  T2 N1 ER/ID negative HER-2 positive left breast cancer 2013 treated with Adriamycin Cytoxan Taxol Herceptin and bilateral mastectomies plus radiation 9 Years from diagnosis  2.  BRCA negative VUS ABRAXAS1  3.  Hemangioma of the liver  4.  family history of malignancy  5.  Mild chronic anemia iron normal -scope 8/19 neg  6.  Anxiety intolerant of Effexor ?  Zyprexa 2.5 mg   7. Recurrent syncope without obvious precipitating cause workup underway  8.  Incidental finding of a monoclonal gammopathy IgG kappa skeletal survey negative bone marrow 8/18  showing 5% plasma cells with no amyloid  · M protein 1.1 g in 8/22 with abnormal light chain ratio which is stable  9.  Left neck pain and radicular symptoms?  Degenerative disease  ·         C-spine DJD refer to neurosurgery    Plan  1.return for myeloma parameters in 6 months  2.  see me in 12 months   3.  Strongly encouraged COVID-19 vaccination  4.  Refer to Dr. Cooper for neck pain      I spent >40 total minutes, face-to-face, caring for Santos today.  Greater than 50% of this time involved counseling and/or coordination of care as documented within this note.        8/25/2022      CC:

## 2022-09-15 ENCOUNTER — OFFICE VISIT (OUTPATIENT)
Dept: NEUROSURGERY | Facility: CLINIC | Age: 57
End: 2022-09-15

## 2022-09-15 VITALS
DIASTOLIC BLOOD PRESSURE: 76 MMHG | OXYGEN SATURATION: 99 % | HEART RATE: 73 BPM | HEIGHT: 63 IN | RESPIRATION RATE: 18 BRPM | WEIGHT: 154 LBS | BODY MASS INDEX: 27.29 KG/M2 | SYSTOLIC BLOOD PRESSURE: 118 MMHG

## 2022-09-15 DIAGNOSIS — M54.12 CERVICAL RADICULOPATHY: Primary | ICD-10-CM

## 2022-09-15 PROCEDURE — 99204 OFFICE O/P NEW MOD 45 MIN: CPT | Performed by: NEUROLOGICAL SURGERY

## 2022-09-15 NOTE — PROGRESS NOTES
Patient ID: Santos Witt is a 57 y.o. female is being seen for consultation today at the request of Jorge Stinson MD.  Patient comes in today with neck pain.  MRI cervical at [facility] on 8/17/22.  Patient stated left arm side but right sometimes does hurt.     Subjective     The patient is here in regards to   Chief Complaint   Patient presents with   • Arm Pain   • Neck Pain       History of Present Illness  Paulina is a recently retired  who has had neck pain for many years and associated left shoulder and occasionally right shoulder pain as well.  She is a breast cancer survivor and has had some lymphadenopathy which she associates with some of her left arm pain near her axilla but she does report burning pain radiating from her neck over her left shoulder into her deltoid and occasionally on the right side as well.  She was initially scheduled to have an MRI but her work-up for her radiculopathy was delayed due to the pandemic.  She has not tried physical therapy or steroid injections.  Occasionally over-the-counter medications.  She has seen some benefit from doing yoga and also some benefit from being retired and not required to work in a nonergonomic position anymore.      While in the room and during my examination of the patient I wore a mask and eye protection.  I washed my hands before and after this patient encounter.  The patient was also wearing a mask.    The following portions of the patient's history were reviewed and updated as appropriate: allergies, current medications, past family history, past medical history, past social history, past surgical history and problem list.    Review of Systems   HENT: Negative.    Eyes: Negative.    Respiratory: Negative.    Cardiovascular: Negative.    Gastrointestinal: Negative.    Endocrine: Negative.    Genitourinary: Negative.    Musculoskeletal: Positive for neck pain and neck stiffness.   Skin: Negative.    Allergic/Immunologic: Positive for  environmental allergies.   Neurological: Positive for weakness and numbness.   Hematological: Negative.    Psychiatric/Behavioral: Negative.         Past Medical History:   Diagnosis Date   • Acquired hypothyroidism    • Anemia     History of anemia   • Arthritis     Knees   • Asthma    • Cancer (HCC) 2013    Left breast cancer, T2NM0   • Chest pain    • H/O Chondromalacia patellae, left    • H/O Regurgitation, mitral/triscupid, mild    • H/O Syncope w/collapse    • Heart murmur     Childhood   • History of chronic fatigue    • Hx of bladder infections    • Lymphedema    • Migraines    • MVP (mitral valve prolapse)    • Pneumonia        Allergies   Allergen Reactions   • Ciprofloxacin Hives   • Sulfa Antibiotics Hives   • Amoxicillin Nausea And Vomiting       Family History   Problem Relation Age of Onset   • Kidney cancer Mother         Treated surgically   • Heart disease Mother    • Hypertension Mother    • Thyroid disease Mother    • Lung cancer Mother    • Heart attack Mother    • Hypertension Father    • Heart disease Father    • Heart attack Father    • Thyroid disease Sister    • No Known Problems Brother    • Breast cancer Paternal Aunt    • Heart disease Paternal Aunt    • Kidney cancer Paternal Uncle    • Colon cancer Paternal Grandfather         In his 60s   • Breast cancer Paternal Aunt    • Diabetes Maternal Aunt    • Pancreatic cancer Other    • Heart disease Maternal Grandmother    • Heart attack Maternal Grandmother    • Heart disease Paternal Grandmother    • Heart attack Paternal Grandmother        Social History     Socioeconomic History   • Marital status: Single   • Number of children: 0   • Years of education: High school   Tobacco Use   • Smoking status: Never Smoker   • Smokeless tobacco: Never Used   Substance and Sexual Activity   • Alcohol use: Yes     Comment: Very little   • Drug use: No   • Sexual activity: Defer       Past Surgical History:   Procedure Laterality Date   • BREAST  BIOPSY Left 08/2013    x2    • BREAST CYST EXCISION Left 1982    Fibroid cyst left nipple   • BREAST SURGERY Bilateral 09/2013    Mastectomy   • HYSTERECTOMY  06/2011    Partial   • KNEE ARTHROSCOPY Left 2006   • PELVIC LAPAROSCOPY  2002    cysts removed   • TOTAL HIP ARTHROPLASTY Left 2019         Objective     Vitals:    09/15/22 1108   BP: 118/76   Pulse: 73   Resp: 18   SpO2: 99%     Body mass index is 27.28 kg/m².    Physical Exam  Constitutional:       Appearance: Normal appearance.   HENT:      Head: Normocephalic and atraumatic.   Eyes:      Extraocular Movements: Extraocular movements intact.      Conjunctiva/sclera: Conjunctivae normal.      Pupils: Pupils are equal, round, and reactive to light.   Cardiovascular:      Rate and Rhythm: Normal rate and regular rhythm.      Pulses: Normal pulses.   Pulmonary:      Breath sounds: Normal breath sounds.   Abdominal:      Palpations: Abdomen is soft.   Musculoskeletal:         General: Normal range of motion.      Cervical back: Normal range of motion and neck supple.   Skin:     General: Skin is warm and dry.   Neurological:      Mental Status: She is alert and oriented to person, place, and time.      Cranial Nerves: Cranial nerves are intact.      Motor: Motor function is intact. No weakness or atrophy.      Coordination: Coordination is intact. Romberg sign negative. Romberg Test normal.      Gait: Gait is intact. Gait normal.      Deep Tendon Reflexes: Reflexes are normal and symmetric.      Reflex Scores:       Tricep reflexes are 2+ on the right side and 2+ on the left side.       Bicep reflexes are 2+ on the right side and 2+ on the left side.       Brachioradialis reflexes are 2+ on the right side and 2+ on the left side.       Patellar reflexes are 2+ on the right side and 2+ on the left side.       Achilles reflexes are 2+ on the right side and 2+ on the left side.  Psychiatric:         Speech: Speech normal.         Neurologic Exam     Mental Status    Oriented to person, place, and time.   Attention: normal. Concentration: normal.   Speech: speech is normal   Level of consciousness: alert    Cranial Nerves   Cranial nerves II through XII intact.     CN III, IV, VI   Pupils are equal, round, and reactive to light.    Motor Exam   Muscle bulk: normal  Overall muscle tone: normal    Strength   Strength 5/5 except as noted.     Sensory Exam   Light touch normal.     Gait, Coordination, and Reflexes     Gait  Gait: normal    Coordination   Romberg: negative    Reflexes   Reflexes 2+ except as noted.   Right brachioradialis: 2+  Left brachioradialis: 2+  Right biceps: 2+  Left biceps: 2+  Right triceps: 2+  Left triceps: 2+  Right patellar: 2+  Left patellar: 2+  Right achilles: 2+  Left achilles: 2+  Right Conrad: absent  Left Conrad: absent      Assessment & Plan   Independent Review of Radiographic Studies:      I personally reviewed the images from the following studies.    MR: MRI of the cervical spine wo contrast was reviewed and shows Straightening of her normal cervical lordosis with degenerative disease that is worse at the C4-5, C5-6, C6-7 levels with scalloping of the vertebral bodies and bulging of the discs anteriorly and posteriorly resulting in severe central now stenosis at the C4-5, C5-6, C6-7 levels.  The worst stenosis is at the C5-6 level.  There is no evidence of T2 cord signal change.  There is severe foraminal stenosis at these levels.    Assessment/Plan: Paulina has some C5 and C6 radiculopathy but no evidence of myelopathy on imaging or exam at this point despite her severe stenosis.  I think at this point we can try a cervical epidural steroid injection to see if that gives her any relief of her radiculopathy and if not, can discuss surgery later down the line.  We did also try some physical therapy to see if some core and muscle strengthening will help her cervicalgia as well.    Medical Decision Making:      Physical therapy  referral  Cervical epidural steroid injection         Diagnoses and all orders for this visit:    1. Cervical radiculopathy (Primary)  -     Ambulatory Epidural Steroid Injection  -     Ambulatory Referral to Physical Therapy Evaluate and treat             Patient Instructions/Recommendations:    Please call if you start to develop any weakness or numbness or balance issues over time.      Roby Rodriguez MD  09/15/22  11:30 EDT

## 2022-09-16 ENCOUNTER — OFFICE VISIT (OUTPATIENT)
Dept: CARDIOLOGY | Facility: CLINIC | Age: 57
End: 2022-09-16

## 2022-09-16 VITALS
HEART RATE: 54 BPM | BODY MASS INDEX: 27.64 KG/M2 | DIASTOLIC BLOOD PRESSURE: 84 MMHG | SYSTOLIC BLOOD PRESSURE: 128 MMHG | HEIGHT: 63 IN | WEIGHT: 156 LBS

## 2022-09-16 DIAGNOSIS — R07.2 PRECORDIAL PAIN: Primary | ICD-10-CM

## 2022-09-16 DIAGNOSIS — R42 DIZZINESS: ICD-10-CM

## 2022-09-16 DIAGNOSIS — C50.411 MALIGNANT NEOPLASM OF UPPER-OUTER QUADRANT OF BOTH BREASTS IN FEMALE, ESTROGEN RECEPTOR POSITIVE: ICD-10-CM

## 2022-09-16 DIAGNOSIS — C50.412 MALIGNANT NEOPLASM OF UPPER-OUTER QUADRANT OF BOTH BREASTS IN FEMALE, ESTROGEN RECEPTOR POSITIVE: ICD-10-CM

## 2022-09-16 DIAGNOSIS — Z17.0 MALIGNANT NEOPLASM OF UPPER-OUTER QUADRANT OF BOTH BREASTS IN FEMALE, ESTROGEN RECEPTOR POSITIVE: ICD-10-CM

## 2022-09-16 DIAGNOSIS — Z92.21 STATUS POST ADMINISTRATION OF CARDIOTOXIC CHEMOTHERAPY: ICD-10-CM

## 2022-09-16 PROCEDURE — 99214 OFFICE O/P EST MOD 30 MIN: CPT | Performed by: INTERNAL MEDICINE

## 2022-09-16 PROCEDURE — 93000 ELECTROCARDIOGRAM COMPLETE: CPT | Performed by: INTERNAL MEDICINE

## 2022-09-16 NOTE — PROGRESS NOTES
Date of Office Visit: 2022  Encounter Provider: Bebe Fernandez MD  Place of Service: [facility] CARDIOLOGY  Patient Name: Santos Witt  :1965    Chief complaint  Cardiology care, mitral valve prolapse    History of Present Illness  Patient is a 56-year-old female with history of mitral valve prolapse, hypothyroidism, asthma, anemia and breast cancer followed by Dr. Stinson.  She was diagnosed with left-sided breast cancer in  treated with Adriamycin/Cytoxan/Taxol and Herceptin and left-sided radiation.  She had bilateral mastectomy and radiation therapy.  During chemotherapy ejection fraction fell from 63% to 53%.  Herceptin was held and subsequently resumed when ejection fraction improved to 64%.  Stress echocardiogram 2018 with an ejection fraction was 62% with no ischemia and no evidence of mitral prolapse.  She  was subsequeshently found to have MGUS which is felt to be stable.  In 2021 she was seen for dyspnea on exertion stress echocardiogram showed normal systolic function ejection fraction 65% with GLS at -20.8% with mild left ventricular hypertrophy, diastolic function was indeterminate.  She had multiple small jets of mitral regurgitation felt to be overall mild without evidence of prolapse.  With trivial tricuspid regurgitation normal right-sided pressures.  While patient had chest pain on the treadmill EKG and echo images were normal at 8 METS.    Since last visit she has been walking 8 to 10 miles a week.  She states 2 weeks ago she had an episode of chest discomfort that was on the left side of her chest lasted less than a minute.  A week later she had another brief episode of chest discomfort that was more palpitations that lasted less than a minute this awoke her from sleep.  She does snore occasionally at night she has some daytime somnolence she is a family history of sleep apnea. Dizziness has improved and rarely occurs she had an episode of chest  pressure 2 months ago.  Also troubled with neck pain and is seeing neurosurgery and is to have an epidural injection.      Blood work 8/2020 includes normal CMP, hemoglobin 11.2, M spike noted.          Past Medical History:   Diagnosis Date   • Acquired hypothyroidism    • Anemia     History of anemia   • Arthritis     Knees   • Asthma    • Cancer (HCC) 2013    Left breast cancer, T2NM0   • Chest pain    • H/O Chondromalacia patellae, left    • H/O Regurgitation, mitral/triscupid, mild    • H/O Syncope w/collapse    • Heart murmur     Childhood   • History of chronic fatigue    • Hx of bladder infections    • Lymphedema    • Migraines    • MVP (mitral valve prolapse)    • Pneumonia      Past Surgical History:   Procedure Laterality Date   • BREAST BIOPSY Left 08/2013    x2    • BREAST CYST EXCISION Left 1982    Fibroid cyst left nipple   • BREAST SURGERY Bilateral 09/2013    Mastectomy   • HYSTERECTOMY  06/2011    Partial   • KNEE ARTHROSCOPY Left 2006   • PELVIC LAPAROSCOPY  2002    cysts removed   • TOTAL HIP ARTHROPLASTY Left 2019     Outpatient Medications Prior to Visit   Medication Sig Dispense Refill   • Acetaminophen 500 MG capsule Take  by mouth.     • albuterol sulfate  (90 Base) MCG/ACT inhaler Inhale 2 puffs Every 4 (Four) Hours As Needed for Wheezing.     • ascorbic acid (VITAMIN C) 500 MG tablet Take 1,000 mg by mouth Daily.     • B Complex Vitamins (VITAMIN B COMPLEX) capsule capsule Take  by mouth Daily.     • Cholecalciferol (VITAMIN D3) 5000 UNITS capsule capsule Take 5,000 Units by mouth 1 (One) Time Per Week.     • EPINEPHrine (EPIPEN) 0.3 MG/0.3ML solution auto-injector injection INJECT 0.3 MILLILITER (0.3 MG) BY INTRAMUSCULAR ROUTE ONCE AS NEEDED FOR ANAPHYLAXIS  0   • Fluticasone-Salmeterol (ADVAIR HFA IN) 2 (two) times a day.     • ipratropium-albuterol (DUO-NEB) 0.5-2.5 mg/3 ml nebulizer Take 3 mL by nebulization Every 4 (Four) Hours As Needed for Wheezing.     • levothyroxine  (SYNTHROID, LEVOTHROID) 75 MCG tablet Take 75 mcg by mouth.     • LORATADINE PO Take  by mouth.     • magnesium oxide (MAGOX) 400 (241.3 Mg) MG tablet tablet Take 400 mg by mouth Daily.     • mometasone (NASONEX) 50 MCG/ACT nasal spray mometasone 50 mcg/actuation nasal spray   SPRAY 2 SPRAYS IN EACH NOSTRIL BY INTRANASAL ROUTE ONCE DAILY     • Multiple Vitamin (MULTI-VITAMINS PO) Take  by mouth daily.     • Omega-3 Fatty Acids (OMEGA 3 500 PO) Take  by mouth.     • PATIENT SUPPLIED ALLERGY INJECTION Inject  under the skin into the appropriate area as directed 1 (One) Time.       No facility-administered medications prior to visit.       Allergies as of 09/16/2022 - Reviewed 09/16/2022   Allergen Reaction Noted   • Ciprofloxacin Hives 03/15/2016   • Sulfa antibiotics Hives 03/15/2016   • Penicillins Nausea And Vomiting 03/18/2022   • Amoxicillin Nausea And Vomiting 03/15/2016     Social History     Socioeconomic History   • Marital status: Single   • Number of children: 0   • Years of education: High school   Tobacco Use   • Smoking status: Never Smoker   • Smokeless tobacco: Never Used   Substance and Sexual Activity   • Alcohol use: Yes     Comment: Very little   • Drug use: No   • Sexual activity: Defer     Family History   Problem Relation Age of Onset   • Kidney cancer Mother         Treated surgically   • Heart disease Mother    • Hypertension Mother    • Thyroid disease Mother    • Lung cancer Mother    • Heart attack Mother    • Hypertension Father    • Heart disease Father    • Heart attack Father    • Thyroid disease Sister    • No Known Problems Brother    • Breast cancer Paternal Aunt    • Heart disease Paternal Aunt    • Kidney cancer Paternal Uncle    • Colon cancer Paternal Grandfather         In his 60s   • Breast cancer Paternal Aunt    • Diabetes Maternal Aunt    • Pancreatic cancer Other    • Heart disease Maternal Grandmother    • Heart attack Maternal Grandmother    • Heart disease Paternal  "Grandmother    • Heart attack Paternal Grandmother      Review of Systems   Constitutional: Negative for chills, fever, weight gain and weight loss.   Cardiovascular: Positive for chest pain, dyspnea on exertion and palpitations. Negative for leg swelling.   Respiratory: Negative for cough, snoring and wheezing.    Hematologic/Lymphatic: Negative for bleeding problem. Does not bruise/bleed easily.   Skin: Negative for color change.   Musculoskeletal: Positive for joint pain. Negative for falls and myalgias.   Gastrointestinal: Negative for melena.   Genitourinary: Negative for hematuria.   Neurological: Negative for excessive daytime sleepiness.   Psychiatric/Behavioral: Negative for depression. The patient is not nervous/anxious.         Objective:     Vitals:    09/16/22 1008   BP: 128/84   Pulse: 54   Weight: 70.8 kg (156 lb)   Height: 160 cm (63\")     Body mass index is 27.63 kg/m².    Vitals reviewed.   Constitutional:       Appearance: Well-developed.   Eyes:      General: No scleral icterus.        Right eye: No discharge.      Conjunctiva/sclera: Conjunctivae normal.      Pupils: Pupils are equal, round, and reactive to light.   HENT:      Head: Normocephalic.      Nose: Nose normal.   Neck:      Thyroid: No thyromegaly.      Vascular: No JVD.   Pulmonary:      Effort: Pulmonary effort is normal. No respiratory distress.      Breath sounds: Normal breath sounds. No wheezing. No rales.   Cardiovascular:      Normal rate. Regular rhythm.      No gallop.   Abdominal:      General: Bowel sounds are normal. There is no distension.      Palpations: Abdomen is soft.      Tenderness: There is no abdominal tenderness. There is no rebound.   Musculoskeletal: Normal range of motion.         General: No tenderness.      Cervical back: Normal range of motion and neck supple. Skin:     General: Skin is warm and dry.      Findings: No erythema or rash.   Neurological:      Mental Status: Alert and oriented to person, " place, and time.   Psychiatric:         Behavior: Behavior normal.         Thought Content: Thought content normal.         Judgment: Judgment normal.       Lab Review:     ECG 12 Lead    Date/Time: 9/16/2022 10:22 AM  Performed by: Bebe Fernandez MD  Authorized by: Bebe Fernandez MD   Comparison: compared with previous ECG   Similar to previous ECG  Rhythm: sinus rhythm  Other findings: left ventricular hypertrophy and left atrial abnormality    Clinical impression: abnormal EKG          Assessment:       Diagnosis Plan   1. Precordial pain  ECG 12 Lead    Adult Transthoracic Echo Complete W/ Cont if Necessary Per Protocol   2. Dizziness  ECG 12 Lead    Adult Transthoracic Echo Complete W/ Cont if Necessary Per Protocol    Holter Monitor - 72 Hour Up To 15 Days   3. Malignant neoplasm of upper-outer quadrant of both breasts in female, estrogen receptor positive (HCC)     4. Status post administration of cardiotoxic chemotherapy       Plan:       1.  History of left-sided breast cancer treated with AC/Taxol and Herceptin and left-sided radiation no evidence of cardiotoxicity by subsequent echoes including the last one 6/2021.  2.  Chest pain and palpitations.  This does not sound anginal in nature.  Nocturnal episode is suspicious for arrhythmogenic etiology.  We will place a 2-week Zio patch though should do this until next week she has plans for the weekend.  We will also check an echocardiogram.  3.  Probable sleep apnea.  I recommend a home sleep study but she does not wish to pursue this at this time she will consider for later date.  3.  History of GE reflux disease  4.  Family history of premature atherosclerotic disease      Time Spent: I spent 35 minutes caring for Santos on this date of service. This time includes time spent by me in the following activities: preparing for the visit, reviewing tests, obtaining and/or reviewing a separately obtained history, performing a medically appropriate examination and/or  evaluation, counseling and educating the patient/family/caregiver, ordering medications, tests, or procedures, documenting information in the medical record and independently interpreting results and communicating that information with the patient/family/caregiver.   I spent 1 minutes on the separately reported service of ECG. This time is not included in the time used to support the E/M service also reported today.        Your medication list          Accurate as of September 16, 2022 11:59 PM. If you have any questions, ask your nurse or doctor.            CONTINUE taking these medications      Instructions Last Dose Given Next Dose Due   Acetaminophen 500 MG capsule      Take  by mouth.       ADVAIR HFA IN      2 (two) times a day.       albuterol sulfate  (90 Base) MCG/ACT inhaler  Commonly known as: PROVENTIL HFA;VENTOLIN HFA;PROAIR HFA      Inhale 2 puffs Every 4 (Four) Hours As Needed for Wheezing.       ascorbic acid 500 MG tablet  Commonly known as: VITAMIN C      Take 1,000 mg by mouth Daily.       EPINEPHrine 0.3 MG/0.3ML solution auto-injector injection  Commonly known as: EPIPEN      INJECT 0.3 MILLILITER (0.3 MG) BY INTRAMUSCULAR ROUTE ONCE AS NEEDED FOR ANAPHYLAXIS       ipratropium-albuterol 0.5-2.5 mg/3 ml nebulizer  Commonly known as: DUO-NEB      Take 3 mL by nebulization Every 4 (Four) Hours As Needed for Wheezing.       levothyroxine 75 MCG tablet  Commonly known as: SYNTHROID, LEVOTHROID      Take 75 mcg by mouth.       LORATADINE PO      Take  by mouth.       magnesium oxide 400 (241.3 Mg) MG tablet tablet  Commonly known as: MAGOX      Take 400 mg by mouth Daily.       mometasone 50 MCG/ACT nasal spray  Commonly known as: NASONEX      mometasone 50 mcg/actuation nasal spray   SPRAY 2 SPRAYS IN EACH NOSTRIL BY INTRANASAL ROUTE ONCE DAILY       multivitamin tablet tablet  Commonly known as: THERAGRAN      Take  by mouth daily.       OMEGA 3 500 PO      Take  by mouth.       PATIENT  SUPPLIED ALLERGY INJECTION      Inject  under the skin into the appropriate area as directed 1 (One) Time.       vitamin b complex capsule capsule      Take  by mouth Daily.       vitamin D3 125 MCG (5000 UT) capsule capsule      Take 5,000 Units by mouth 1 (One) Time Per Week.              Patient is no longer taking -.  I corrected the med list to reflect this.  I did not stop these medications.      Dictated utilizing Dragon dictation

## 2022-09-21 ENCOUNTER — TELEPHONE (OUTPATIENT)
Dept: NEUROSURGERY | Facility: CLINIC | Age: 57
End: 2022-09-21

## 2022-09-21 NOTE — TELEPHONE ENCOUNTER
Pt would like for Dr Rodriguez to excuse her from jury duty permanently for her cervical radiculopathy.  Pt will p/u letter when contacted [phone]

## 2022-09-22 ENCOUNTER — TELEPHONE (OUTPATIENT)
Dept: CARDIOLOGY | Facility: CLINIC | Age: 57
End: 2022-09-22

## 2022-09-22 NOTE — TELEPHONE ENCOUNTER
Roby Rodriguez MD  You 1 hour ago (11:09 AM)     EC    Tell her sorry but we cannot do that.            I called and s/w patient to let her know that we are unable to give letter for jury duty.

## 2022-09-22 NOTE — TELEPHONE ENCOUNTER
I told patient that we would only excuse her from jury duty if she is scheduled from surgery or recovering from surgery.  She asked if she can have a temporary excuse then till have the first of the year.  I told her I would ask but Dr. Noonan answer would probably be the same.

## 2022-09-22 NOTE — TELEPHONE ENCOUNTER
Please advise the patient that we can only excuse her from jury duty if she is to be scheduled for surgery or recovering from surgery around that time, however we are not unable to excuse her from jury duty for cervical radiculopathy

## 2022-09-22 NOTE — TELEPHONE ENCOUNTER
Pt called and wants to know if she can be excused from Jury Duty.  She does have to have a note in 5 days if we are able to provide.      Please advise.      ECHO is schedule 10/5/2022 and still pending the scheduling of the monitor.  Still pending insurance approval.

## 2022-09-23 NOTE — TELEPHONE ENCOUNTER
I am sorry but I do not think that her current conditions that we are following would allow us to excuse her from jury duty.

## 2022-09-28 ENCOUNTER — TELEPHONE (OUTPATIENT)
Dept: CARDIOLOGY | Facility: CLINIC | Age: 57
End: 2022-09-28

## 2022-09-30 DIAGNOSIS — R07.2 PRECORDIAL PAIN: Primary | ICD-10-CM

## 2022-09-30 DIAGNOSIS — R00.2 PALPITATIONS: ICD-10-CM

## 2022-10-05 ENCOUNTER — TELEPHONE (OUTPATIENT)
Dept: NEUROSURGERY | Facility: CLINIC | Age: 57
End: 2022-10-05

## 2022-10-05 ENCOUNTER — HOSPITAL ENCOUNTER (OUTPATIENT)
Dept: CARDIOLOGY | Facility: HOSPITAL | Age: 57
Discharge: HOME | End: 2022-10-05
Admitting: INTERNAL MEDICINE

## 2022-10-05 VITALS
SYSTOLIC BLOOD PRESSURE: 118 MMHG | HEIGHT: 63 IN | DIASTOLIC BLOOD PRESSURE: 70 MMHG | BODY MASS INDEX: 27.64 KG/M2 | WEIGHT: 156 LBS | HEART RATE: 65 BPM

## 2022-10-05 DIAGNOSIS — R07.2 PRECORDIAL PAIN: ICD-10-CM

## 2022-10-05 DIAGNOSIS — R42 DIZZINESS: ICD-10-CM

## 2022-10-05 LAB
AORTIC ARCH: 1.9 CM
ASCENDING AORTA: 3.1 CM
BH CV ECHO LEFT VENTRICLE GLOBAL LONGITUDINAL STRAIN: -21.4 %
BH CV ECHO MEAS - ACS: 1.94 CM
BH CV ECHO MEAS - AO MAX PG: 9.5 MMHG
BH CV ECHO MEAS - AO MEAN PG: 5 MMHG
BH CV ECHO MEAS - AO ROOT DIAM: 2.9 CM
BH CV ECHO MEAS - AO V2 MAX: 154 CM/SEC
BH CV ECHO MEAS - AO V2 VTI: 34.4 CM
BH CV ECHO MEAS - AVA(I,D): 1.51 CM2
BH CV ECHO MEAS - EDV(CUBED): 80.4 ML
BH CV ECHO MEAS - EDV(MOD-SP2): 72 ML
BH CV ECHO MEAS - EDV(MOD-SP4): 71 ML
BH CV ECHO MEAS - EF(MOD-BP): 65.1 %
BH CV ECHO MEAS - EF(MOD-SP2): 68.1 %
BH CV ECHO MEAS - EF(MOD-SP4): 64.8 %
BH CV ECHO MEAS - EF_3D-VOL: 62 %
BH CV ECHO MEAS - ESV(CUBED): 14.3 ML
BH CV ECHO MEAS - ESV(MOD-SP2): 23 ML
BH CV ECHO MEAS - ESV(MOD-SP4): 25 ML
BH CV ECHO MEAS - FS: 43.8 %
BH CV ECHO MEAS - IVS/LVPW: 0.81 CM
BH CV ECHO MEAS - IVSD: 1.16 CM
BH CV ECHO MEAS - LAT PEAK E' VEL: 13.1 CM/SEC
BH CV ECHO MEAS - LV DIASTOLIC VOL/BSA (35-75): 38.1 CM2
BH CV ECHO MEAS - LV MASS(C)D: 208.9 GRAMS
BH CV ECHO MEAS - LV MAX PG: 3.4 MMHG
BH CV ECHO MEAS - LV MEAN PG: 2 MMHG
BH CV ECHO MEAS - LV SYSTOLIC VOL/BSA (12-30): 13.4 CM2
BH CV ECHO MEAS - LV V1 MAX: 91.7 CM/SEC
BH CV ECHO MEAS - LV V1 VTI: 20.6 CM
BH CV ECHO MEAS - LVIDD: 4.3 CM
BH CV ECHO MEAS - LVIDS: 2.43 CM
BH CV ECHO MEAS - LVOT AREA: 2.5 CM2
BH CV ECHO MEAS - LVOT DIAM: 1.79 CM
BH CV ECHO MEAS - LVPWD: 1.44 CM
BH CV ECHO MEAS - MED PEAK E' VEL: 8.7 CM/SEC
BH CV ECHO MEAS - MR MAX PG: 92 MMHG
BH CV ECHO MEAS - MR MAX VEL: 479.5 CM/SEC
BH CV ECHO MEAS - MV A DUR: 0.13 SEC
BH CV ECHO MEAS - MV A MAX VEL: 83.1 CM/SEC
BH CV ECHO MEAS - MV DEC SLOPE: 470.7 CM/SEC2
BH CV ECHO MEAS - MV DEC TIME: 0.16 MSEC
BH CV ECHO MEAS - MV E MAX VEL: 101.1 CM/SEC
BH CV ECHO MEAS - MV E/A: 1.22
BH CV ECHO MEAS - MV MAX PG: 4.5 MMHG
BH CV ECHO MEAS - MV MEAN PG: 1.8 MMHG
BH CV ECHO MEAS - MV P1/2T: 66.1 MSEC
BH CV ECHO MEAS - MV V2 VTI: 37.2 CM
BH CV ECHO MEAS - MVA(P1/2T): 3.3 CM2
BH CV ECHO MEAS - MVA(VTI): 1.39 CM2
BH CV ECHO MEAS - PA ACC TIME: 0.13 SEC
BH CV ECHO MEAS - PA PR(ACCEL): 20.8 MMHG
BH CV ECHO MEAS - PA V2 MAX: 108.6 CM/SEC
BH CV ECHO MEAS - PULM A REVS DUR: 0.11 SEC
BH CV ECHO MEAS - PULM A REVS VEL: 31.3 CM/SEC
BH CV ECHO MEAS - PULM DIAS VEL: 36 CM/SEC
BH CV ECHO MEAS - PULM S/D: 1.43
BH CV ECHO MEAS - PULM SYS VEL: 51.4 CM/SEC
BH CV ECHO MEAS - QP/QS: 1.05
BH CV ECHO MEAS - RAP SYSTOLE: 3 MMHG
BH CV ECHO MEAS - RV MAX PG: 2.8 MMHG
BH CV ECHO MEAS - RV V1 MAX: 84.4 CM/SEC
BH CV ECHO MEAS - RV V1 VTI: 18.2 CM
BH CV ECHO MEAS - RVOT DIAM: 1.95 CM
BH CV ECHO MEAS - RVSP: 30 MMHG
BH CV ECHO MEAS - SI(MOD-SP2): 26.3 ML/M2
BH CV ECHO MEAS - SI(MOD-SP4): 24.7 ML/M2
BH CV ECHO MEAS - SUP REN AO DIAM: 1.8 CM
BH CV ECHO MEAS - SV(LVOT): 51.8 ML
BH CV ECHO MEAS - SV(MOD-SP2): 49 ML
BH CV ECHO MEAS - SV(MOD-SP4): 46 ML
BH CV ECHO MEAS - SV(RVOT): 54.5 ML
BH CV ECHO MEAS - TAPSE (>1.6): 1.96 CM
BH CV ECHO MEAS - TR MAX PG: 26.6 MMHG
BH CV ECHO MEAS - TR MAX VEL: 257.9 CM/SEC
BH CV ECHO MEASUREMENTS AVERAGE E/E' RATIO: 9.28
BH CV XLRA - RV BASE: 2.8 CM
BH CV XLRA - RV LENGTH: 5.8 CM
BH CV XLRA - RV MID: 2.07 CM
BH CV XLRA - TDI S': 12.9 CM/SEC
LEFT ATRIUM VOLUME INDEX: 21.5 ML/M2
LEFT ATRIUM VOLUME: 30 ML
LV DP/DT: 551 MMHG/SEC
MAXIMAL PREDICTED HEART RATE: 163 BPM
SINUS: 2.8 CM
STJ: 2.47 CM
STRESS TARGET HR: 139 BPM

## 2022-10-05 PROCEDURE — 93306 TTE W/DOPPLER COMPLETE: CPT

## 2022-10-05 PROCEDURE — 93356 MYOCRD STRAIN IMG SPCKL TRCK: CPT | Performed by: INTERNAL MEDICINE

## 2022-10-05 PROCEDURE — 93356 MYOCRD STRAIN IMG SPCKL TRCK: CPT

## 2022-10-05 PROCEDURE — 93306 TTE W/DOPPLER COMPLETE: CPT | Performed by: INTERNAL MEDICINE

## 2022-10-07 ENCOUNTER — HOSPITAL ENCOUNTER (OUTPATIENT)
Dept: PHYSICAL THERAPY | Facility: HOSPITAL | Age: 57
Setting detail: THERAPIES SERIES
Discharge: HOME | End: 2022-10-07

## 2022-10-07 ENCOUNTER — TELEPHONE (OUTPATIENT)
Dept: CARDIOLOGY | Facility: CLINIC | Age: 57
End: 2022-10-07

## 2022-10-07 DIAGNOSIS — M25.512 CHRONIC LEFT SHOULDER PAIN: ICD-10-CM

## 2022-10-07 DIAGNOSIS — M54.2 NECK PAIN: Primary | ICD-10-CM

## 2022-10-07 DIAGNOSIS — G89.29 CHRONIC LEFT SHOULDER PAIN: ICD-10-CM

## 2022-10-07 PROCEDURE — 97110 THERAPEUTIC EXERCISES: CPT

## 2022-10-07 PROCEDURE — 97535 SELF CARE MNGMENT TRAINING: CPT

## 2022-10-07 NOTE — THERAPY EVALUATION
Outpatient Physical Therapy Ortho Initial Evaluation  [facility]     Patient Name: Santos Witt  : 1965  MRN: [id number]  Today's Date: 10/7/2022      Visit Date: 10/07/2022    Patient Active Problem List   Diagnosis   • Bilateral malignant neoplasm of upper outer quadrant of breast in female (HCC)   • Chronic pain of left knee   • Acute pain of left knee   • Contusion of left knee   • Arthritis of left knee   • Chondromalacia, patella, left   • MGUS (monoclonal gammopathy of unknown significance)   • Arthritis of left hip   • Left hip pain   • Status post administration of cardiotoxic chemotherapy   • Precordial pain        Past Medical History:   Diagnosis Date   • Acquired hypothyroidism    • Anemia     History of anemia   • Arthritis     Knees   • Asthma    • Cancer (HCC) 2013    Left breast cancer, T2NM0   • Chest pain    • H/O Chondromalacia patellae, left    • H/O Regurgitation, mitral/triscupid, mild    • H/O Syncope w/collapse    • Heart murmur     Childhood   • History of chronic fatigue    • Hx of bladder infections    • Lymphedema    • Migraines    • MVP (mitral valve prolapse)    • Pneumonia         Past Surgical History:   Procedure Laterality Date   • BREAST BIOPSY Left 08/2013    x2    • BREAST CYST EXCISION Left     Fibroid cyst left nipple   • BREAST SURGERY Bilateral 2013    Mastectomy   • HYSTERECTOMY  2011    Partial   • KNEE ARTHROSCOPY Left    • PELVIC LAPAROSCOPY      cysts removed   • TOTAL HIP ARTHROPLASTY Left 2019       Visit Dx:     ICD-10-CM ICD-9-CM   1. Neck pain  M54.2 723.1   2. 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 She states she also has lymphedema in L arm. She has pain in B arms to her upper lateral arm. She has B hand tingling that began with chemotherapy. She had hx of L sided radiation. Pt states neck pain is worse with workout and sleeping isnt the best.  -LB      Patient/Caregiver Goals Return to prior level of function;Know what to do to help the symptoms  -LB      Hand Dominance left-handed  -LB      Occupation/sports/leisure activities pt enjoys walking, outdoor festivals, traveling  -LB      Patient seeing anyone else for problem(s)? yes  -LB      How has patient tried to help current problem? waiting to schedule DAHLIA  -LB      What clinical tests have you had for this problem? MRI  -LB      Results of Clinical Tests see chart  -LB      History of Previous Related Injuries none; chronic neck pain  -LB              Pain     Pain Location Neck  -LB      Pain at Present 4  -LB      Pain at Best 4  -LB      Pain at Worst 6  -LB      Pain Frequency Intermittent  -LB      Pain Description Aching;Sore;Tightness  -LB      What Performance Factors Make the Current Problem(s) WORSE? working out, sleeping  -LB      What Performance Factors Make the Current Problem(s) BETTER? unsure  -LB      Tolerance Time- Standing pain with prolonged standing  -LB      Tolerance Time- Sitting pain with prolonged sitting  -LB      Tolerance Time- Walking able to tolerate  -LB      Tolerance Time- Lying pain with sleeping and reading in bed  -LB      Is your sleep disturbed? Yes  -LB      What position do you sleep in? Supine  -LB      Difficulties at work? Pt is retired.  -LB      Difficulties with ADL's? Able to perform.  -LB      Difficulties with recreational activities? Inc pain with workout.  -LB              Fall Risk Assessment    Any falls in the past year: No  -LB              Services    Prior Rehab/Home Health Experiences No  -LB      Are you currently receiving Home Health services No  -LB      Do  you plan to receive Home Health services in the near future No  -LB              Daily Activities    Primary Language English  -LB      Pt Participated in POC and Goals Yes  -LB              Safety    Are you being hurt, hit, or frightened by anyone at home or in your life? No  -LB      Are you being neglected by a caregiver No  -LB      Have you had any of the following issues with N/A  -LB            User Key  (r) = Recorded By, (t) = Taken By, (c) = Cosigned By    Initials Name Provider Type    Cheryl Dickinson PT Physical Therapist                 PT Ortho     Row Name 10/07/22 0900       Subjective Comments    Subjective Comments My neck is better since I retired but it is not perfect.  -LB       Subjective Pain    Able to rate subjective pain? yes  -LB    Pre-Treatment Pain Level 4  -LB       Posture/Observations    Posture/Observations Comments forward head, obvious UT tightness, wearing B compression sleeves  -LB       Myotomal Screen- Upper Quarter Clearing    Shoulder flexion (C5) Bilateral:;4 (Good)  -LB    Elbow flexion/wrist extension (C6) Bilateral:;4+ (Good +)  -LB    Elbow extension/wrist flexion (C7) Bilateral:;4+ (Good +)  -LB       Cervical/Shoulder ROM Screen    Cervical flexion Normal  tightness; inc kyphosis at lower C, upper T spine  -LB    Cervical extension Normal  tightness  -LB    Cervical lateral flexion Impaired  limited 50% B  -LB    Cervical rotation Impaired  limited 25% B; tightness L > R with B rotation  -LB    Shoulder elevation  Normal  -LB       Cervical/Thoracic Special Tests    Spurlings (Foraminal Compression) Left:;Positive  -LB    Cervical Compression (Forarminal Compression vs. Facet Pain) Positive  -LB    Cervical Distraction (Foraminal Compression vs. Facet Pain) Positive  -LB       General ROM    GENERAL ROM COMMENTS BUE WFL  -LB       MMT (Manual Muscle Testing)    General MMT Comments strength equal B  -LB       Sensation    Sensation WNL? WNL  -LB    Additional  Comments neuropathy B fingertips  -LB       Upper Extremity Flexibility    Suboccipitals Bilateral:;Moderately limited  -LB    Upper Trapezius Bilateral:;Moderately limited  -LB    Levator Scapula Bilateral:;Moderately limited  -LB    Pect Minor Bilateral:;Moderately limited  -LB    Pect Major Bilateral:;Moderately limited  -LB          User Key  (r) = Recorded By, (t) = Taken By, (c) = Cosigned By    Initials Name Provider Type    Cheryl Dickinson, PT Physical Therapist                            Therapy Education  Education Details: issued [employer]: [id number], discussed sleeping position to reduce FF, instructed pt to avoid reading in bed at night in supine position, suggested wedge pillow, postural awareness with driving, avoiding overhead press, adjusting lat pull to chest vs. posterior neck, suggested avoiding pushups and pec fly to reduce pec tightness and focus on flexibility and posterior strengthening  Given: HEP, Symptoms/condition management, Mobility training, Pain management, Posture/body mechanics  Program: New  How Provided: Verbal, Demonstration, Written  Provided to: Patient  Level of Understanding: Teach back education performed, Demonstrated, Verbalized  87691 - PT Self Care/Mgmt Minutes: 15      PT OP Goals     Row Name 10/07/22 1000          PT Short Term Goals    STG Date to Achieve 10/21/22  -LB     STG 1 Pt will demonstrate understanding and compliance with initial HEP.  -LB     STG 1 Progress New  -LB     STG 2 Pt will verbalize compliance with seated posture modifications to reduce FF position while watching TV and at night in bed while reading/phone use.  -LB     STG 2 Progress New  -LB     STG 3 Pt will verbalize reduced pain post workout in neck by 50% with modifcations.  -LB     STG 3 Progress New  -LB            Long Term Goals    LTG Date to Achieve 11/06/22  -LB     LTG 1 Pt will demonstrate normal ROM flexion/extension without discomfort.  -LB     LTG 1 Progress New  -LB     LTG 2  Pt will deny neck pain with sleeping.  -LB     LTG 2 Progress New  -LB     LTG 3 Pt will demonstrate understanding of advanced HEP to promote continued reduction of symptoms.  -LB     LTG 3 Progress New  -LB            Time Calculation    PT Goal Re-Cert Due Date 01/05/23  -LB           User Key  (r) = Recorded By, (t) = Taken By, (c) = Cosigned By    Initials Name Provider Type    LB Cheryl Stover PT Physical Therapist                 PT Assessment/Plan     Row Name 10/07/22 1019          PT Assessment    Functional Limitations Limitations in functional capacity and performance;Performance in leisure activities;Limitations in community activities;Performance in sport activities  -LB     Impairments Impaired flexibility;Impaired lymphatic circulation;Impaired muscle length;Joint mobility;Muscle strength;Pain;Posture;Range of motion;Sensation  -LB     Rehab Potential Good  -LB     Patient/caregiver participated in establishment of treatment plan and goals Yes  -LB     Patient would benefit from skilled therapy intervention Yes  -LB            PT Plan    PT Frequency 1x/week;2x/week  -LB     Predicted Duration of Therapy Intervention (PT) 6-8 visits  -LB     Planned CPT's? PT EVAL LOW COMPLEXITY: 71280;PT RE-EVAL: 36088;PT THER PROC EA 15 MIN: 80203;PT THER ACT EA 15 MIN: 14151;PT MANUAL THERAPY EA 15 MIN: 29849;PT NEUROMUSC RE-EDUCATION EA 15 MIN: 23220;PT SELF CARE/HOME MGMT/TRAIN EA 15: 83101  -LB     PT Plan Comments focus on posture, manual therapy to reduce UT tension, gentle distraction, UT stretch, consider tband row/extension, review workout and ensure modifications are appropraite, consider supine B rotation, HA, seated/standing chin tuck, supine on pool noodle pec opening, SL UT rotation.  -LB           User Key  (r) = Recorded By, (t) = Taken By, (c) = Cosigned By    Initials Name Provider Type    Cheryl Dickinson, PT Physical Therapist                   OP Exercises     Row Name 10/07/22 0900              Subjective Comments    Subjective Comments My neck is better since I retired but it is not perfect.  -LB              Subjective Pain    Able to rate subjective pain? yes  -LB      Pre-Treatment Pain Level 4  -LB              Total Minutes    47814 - PT Therapeutic Exercise Minutes 15  -LB              Exercise 1    Exercise Name 1 shoulder rolls  -LB      Reps 1 10  -LB              Exercise 2    Exercise Name 2 scap retraction  -LB      Reps 2 10  -LB      Time 2 5  -LB              Exercise 3    Exercise Name 3 pec doorway stretch  -LB      Reps 3 3  -LB      Time 3 20  -LB              Exercise 4    Exercise Name 4 supine chin tuck  -LB      Reps 4 10  -LB      Time 4 5  -LB              Exercise 5    Exercise Name 5 seated UT stretch  -LB      Reps 5 3  -LB      Time 5 20  -LB      Additional Comments recommended to perform in shower  -LB            User Key  (r) = Recorded By, (t) = Taken By, (c) = Cosigned By    Initials Name Provider Type    Cheryl Dickinson PT Physical Therapist                              Outcome Measure Options: Neck Disability Index (NDI)  Neck Disability Index  Section 1 - Pain Intensity: The pain is mild at the moment.  Section 2 - Personal Care: I can look after myself normally without causing extra pain.  Section 3 - Lifting: I can lift heavy weights, but it causes extra pain.  Section 4 - Work: I can do as much work as I want.  Section 5 - Headaches: I have slight headaches that come infrequently.  Section 6 - Concentration: I can concentrate fully when I want to with slight difficulty.  Section 7 - Sleeping: My sleep is mildly disturbed (1-2 hours sleepless).  Section 8 - Driving: I can drive as long as I want with slight neck pain.  Section 9 - Reading: I can read as much as I want with moderate neck pain.  Section 10 - Recreation: I am able to engage in most but not all recreational activities because of pain in my neck.  Neck Disability Index Score: 11  Neck Disability Index  Comments: 22% disability      Time Calculation:     Start Time: 0830  Stop Time: 0915  Time Calculation (min): 45 min  Total Timed Code Minutes- PT: 30 minute(s)  Timed Charges  11790 - PT Therapeutic Exercise Minutes: 15  03626 - PT Self Care/Mgmt Minutes: 15  Total Minutes  Timed Charges Total Minutes: 15   Total Minutes: 15     Therapy Charges for Today     Code Description Service Date Service Provider Modifiers Qty    87381221982 HC PT SELF CARE/MGMT/TRAIN EA 15 MIN 10/7/2022 Cheryl Stover, PT GP 1    02382374788 HC PT THER PROC EA 15 MIN 10/7/2022 Cheryl Stover, PT GP 1          PT G-Codes  Outcome Measure Options: Neck Disability Index (NDI)  Neck Disability Index Score: 11         Cheryl Stover PT  10/7/2022

## 2022-10-08 NOTE — TELEPHONE ENCOUNTER
Please let the patient know that the echocardiogram shows a strong heart mild to mildly thickened typically high blood pressure.  Please let her know it is unchanged from prior studies.

## 2022-10-10 NOTE — TELEPHONE ENCOUNTER
Reviewed results with Santos Witt and patient verbalized understanding of results.    Patient is asking if chemo caused her mitral valve prolapse?  Patient did also mention she has had a murmur since childhood.  Explained to patient the two could be related, however, would present question to Dr. Fernandez.    Patient also expressed concern about her blood pressure and asked if she needs to continue monitoring daily.  Last BP:128/74.  Encourage patient to check occasionally, but if the reading is greater than 130/80, to check BP following day.  Requested patient notify office if BP readings are averaging greater than 130/80.  Patient stated she will do this.    Thank you,  Yara Jackson RN  Triage Nurse [facility] [place]

## 2022-10-11 DIAGNOSIS — Z17.0 MALIGNANT NEOPLASM OF UPPER-OUTER QUADRANT OF BOTH BREASTS IN FEMALE, ESTROGEN RECEPTOR POSITIVE: Primary | ICD-10-CM

## 2022-10-11 DIAGNOSIS — C50.412 MALIGNANT NEOPLASM OF UPPER-OUTER QUADRANT OF BOTH BREASTS IN FEMALE, ESTROGEN RECEPTOR POSITIVE: Primary | ICD-10-CM

## 2022-10-11 DIAGNOSIS — C50.411 MALIGNANT NEOPLASM OF UPPER-OUTER QUADRANT OF BOTH BREASTS IN FEMALE, ESTROGEN RECEPTOR POSITIVE: Primary | ICD-10-CM

## 2022-10-12 NOTE — TELEPHONE ENCOUNTER
Spoke to patient regarding echo results.  Prior studies did not show mitral prolapse though she has a sister and I told her that historically we had called mitral valve prolapse and trouble criteria changed many years ago.  Her more recent echoes do not show prolapse and I do not believe she ever had prolapse.  She does have mitral valve regurgitation which has been trivial to mild at most recently it was trivial.  She does have left ventricular hypertrophy which is slightly surprising as she has not had a strong history of hypertension though isolated pressures have been high at various times.  I did review recent echo report and images with strain imaging and there is mild septal variance but it is minimal.  She will continue to track her blood pressure at home.  And depending on monitor results to be returned we will consider further imaging such as cardiac MRI but I think the yield will be low at this point.  She is not having palpitations or chest pain at this point

## 2022-10-14 ENCOUNTER — TELEPHONE (OUTPATIENT)
Dept: CARDIOLOGY | Facility: CLINIC | Age: 57
End: 2022-10-14

## 2022-10-14 NOTE — TELEPHONE ENCOUNTER
----- Message from ROC Abraham sent at 10/13/2022  4:22 PM EDT -----  Please let her know that her 48 hour holter monitor was pretty normal. There were two very brief episodes where her heart beat fast, but this only last about 4 beats. If there are no symptoms, nothing to worry about for now.     She should continue to check BP about 3 times per week. If she notices that BP is consistently > high 130s/90s, she should call the office.     Has she decided if she wants to pursue a sleep study?    Thanks!  ROC Horan

## 2022-10-14 NOTE — TELEPHONE ENCOUNTER
Attempted to call patient, no answer.  Left a voicemail for patient to call back.  Will continue to try to reach patient.    Kristin De Leon RN  [facility] Triage  10/14/22 14:00 EDT

## 2022-10-14 NOTE — TELEPHONE ENCOUNTER
Pt called back. Went over results and recommendations. She verbalized understanding.    She said she is out of town and still thinking about the sleep study. When she returns, she will give us a call and let us know what she wants to do.    Thank you,    Madeline Zaman RN  Triage [facility] [place]  10/14/22 14:53 EDT

## 2022-10-24 NOTE — TELEPHONE ENCOUNTER
Left voicemail for Santos Witt requesting callback.    Thank you,  Yara Jackson RN  Triage Nurse [facility]

## 2022-10-24 NOTE — TELEPHONE ENCOUNTER
Santos Witt returned call with additional questions.  Patient is asking when the two very brief episodes where her heart beat fast occurred?  Patient does not recall having any symptoms during monitor study, but is wondering if these episodes occurred while sleeping or while she was exercising.    Patient declines sleep study at this time, stating she may reconsider it after the holidays.    Please let me know how you would like to proceed.    Thank you,  Yara Jackson, RN  [place] Nurse [facility] [place]

## 2022-10-24 NOTE — TELEPHONE ENCOUNTER
One occurred the first day, 10/5 at 1030 am, and the second occurred on 10/6 at 0720 am.     Thanks!  Lashonda Larson APRN

## 2022-10-24 NOTE — TELEPHONE ENCOUNTER
Reviewed results with Santos Witt and patient verbalized understanding of results.    Thank you,  Yara Jackson RN  [place] Nurse [facility] [place]

## 2022-11-04 ENCOUNTER — HOSPITAL ENCOUNTER (OUTPATIENT)
Dept: PHYSICAL THERAPY | Facility: HOSPITAL | Age: 57
Setting detail: THERAPIES SERIES
Discharge: HOME | End: 2022-11-04

## 2022-11-04 DIAGNOSIS — M25.512 CHRONIC LEFT SHOULDER PAIN: ICD-10-CM

## 2022-11-04 DIAGNOSIS — G89.29 CHRONIC LEFT SHOULDER PAIN: ICD-10-CM

## 2022-11-04 DIAGNOSIS — M54.2 NECK PAIN: Primary | ICD-10-CM

## 2022-11-04 PROCEDURE — 97110 THERAPEUTIC EXERCISES: CPT

## 2022-11-04 PROCEDURE — 97140 MANUAL THERAPY 1/> REGIONS: CPT

## 2022-11-04 NOTE — THERAPY TREATMENT NOTE
Outpatient Physical Therapy Ortho Treatment Note  [facility]     Patient Name: Santos Witt  : 1965  MRN: [id number]  Today's Date: 2022      Visit Date: 2022    Visit Dx:    ICD-10-CM ICD-9-CM   1. Neck pain  M54.2 723.1   2. 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)   • Chronic pain of left knee   • Acute pain of left knee   • Contusion of left knee   • Arthritis of left knee   • Chondromalacia, patella, left   • MGUS (monoclonal gammopathy of unknown significance)   • Arthritis of left hip   • Left hip pain   • Status post administration of cardiotoxic chemotherapy   • Precordial pain        Past Medical History:   Diagnosis Date   • Acquired hypothyroidism    • Anemia     History of anemia   • Arthritis     Knees   • Asthma    • Cancer (HCC) 2013    Left breast cancer, T2NM0   • Chest pain    • H/O Chondromalacia patellae, left    • H/O Regurgitation, mitral/triscupid, mild    • H/O Syncope w/collapse    • Heart murmur     Childhood   • History of chronic fatigue    • Hx of bladder infections    • Lymphedema    • Migraines    • MVP (mitral valve prolapse)    • Pneumonia         Past Surgical History:   Procedure Laterality Date   • BREAST BIOPSY Left 08/2013    x2    • BREAST CYST EXCISION Left     Fibroid cyst left nipple   • BREAST SURGERY Bilateral 2013    Mastectomy   • HYSTERECTOMY  2011    Partial   • KNEE ARTHROSCOPY Left    • PELVIC LAPAROSCOPY      cysts removed   • TOTAL HIP ARTHROPLASTY Left 2019                        PT Assessment/Plan     Row Name 22 1237          PT Assessment    Functional Limitations --  -LB     Impairments --  -LB     Assessment Comments Pt returns for first follow up since evaluation reporting good tolerance to HEP and intermittent compliance with postural improvement while reading  and working out. She has recently travelled and reports inc neck pain with travel. Today L > R UT and pec girdle tightness. Pt tolerated all exercises well. Manual therapy to reduce muscle guarding. Pt required inc cuing to reduce cervical muscle tightness with scapular strengthening. She reports dec pain at end of session.  -LB        PT Plan    PT Plan Comments consider supine B rotation, seated chin tuck, single arm pec doorway stretch  -LB           User Key  (r) = Recorded By, (t) = Taken By, (c) = Cosigned By    Initials Name Provider Type    LB Cheryl Stover, PT Physical Therapist                   OP Exercises     Row Name 11/04/22 1100 11/04/22 0800          Subjective Comments    Subjective Comments -- I am doing ok. My neck is kind of tight from travel.  -LB        Subjective Pain    Able to rate subjective pain? -- yes  -LB     Pre-Treatment Pain Level -- 4  -LB        Total Minutes    87696 - PT Therapeutic Exercise Minutes -- 30  -LB     36288 - PT Manual Therapy Minutes 15  -LB --        Exercise 1    Exercise Name 1 -- shoulder rolls  -LB     Reps 1 -- 10  -LB        Exercise 2    Exercise Name 2 -- scap retraction  -LB     Reps 2 -- 10  -LB     Time 2 -- 5  -LB        Exercise 3    Exercise Name 3 -- pec doorway stretch  -LB     Reps 3 -- 3  -LB     Time 3 -- 20  -LB        Exercise 4    Exercise Name 4 -- supine chin tuck  -LB     Reps 4 -- 10  -LB     Time 4 -- 5  -LB        Exercise 5    Exercise Name 5 -- seated UT stretch  -LB     Reps 5 -- 3  -LB     Time 5 -- 20  -LB        Exercise 6    Exercise Name 6 -- pulleys  -LB     Reps 6 -- 10  -LB     Time 6 -- each  -LB        Exercise 7    Exercise Name 7 -- tband row/extension  -LB     Sets 7 -- 2  -LB     Reps 7 -- 10  -LB     Time 7 -- RTB  -LB        Exercise 8    Exercise Name 8 -- supine on pool noodle  -LB     Reps 8 -- 10  -LB     Time 8 -- pec opening, alternating flexion  -LB        Exercise 9    Exercise Name 9 -- SL upper trunk  rotation  -LB     Reps 9 -- 10  -LB           User Key  (r) = Recorded By, (t) = Taken By, (c) = Cosigned By    Initials Name Provider Type    LB Cheryl Stover, PT Physical Therapist                         Manual Rx (last 36 hours)     Manual Treatments     Row Name 11/04/22 1100             Total Minutes    83587 - PT Manual Therapy Minutes 15  -LB         Manual Rx 1    Manual Rx 1 Location cervical spine, B UT  -LB      Manual Rx 1 Type STM to UT, gentle distraction, manual UT stretch  -LB      Manual Rx 1 Duration 15  -LB            User Key  (r) = Recorded By, (t) = Taken By, (c) = Cosigned By    Initials Name Provider Type    LB Cheryl Stover, PT Physical Therapist                 PT OP Goals     Row Name 11/04/22 1200          PT Short Term Goals    STG Date to Achieve 10/21/22  -LB     STG 1 Pt will demonstrate understanding and compliance with initial HEP.  -LB     STG 1 Progress Ongoing  -LB     STG 1 Progress Comments Intermittent compliance.  -LB     STG 2 Pt will verbalize compliance with seated posture modifications to reduce FF position while watching TV and at night in bed while reading/phone use.  -LB     STG 2 Progress Ongoing  -LB     STG 2 Progress Comments Pt improving ability to modify position to improve posture.  -LB     STG 3 Pt will verbalize reduced pain post workout in neck by 50% with modifcations.  -LB     STG 3 Progress Ongoing  -LB     STG 3 Progress Comments Pt has not returned to workout after recent travel.  -LB        Long Term Goals    LTG Date to Achieve 11/06/22  -LB     LTG 1 Pt will demonstrate normal ROM flexion/extension without discomfort.  -LB     LTG 1 Progress Ongoing  -LB     LTG 2 Pt will deny neck pain with sleeping.  -LB     LTG 2 Progress Ongoing  -LB     LTG 3 Pt will demonstrate understanding of advanced HEP to promote continued reduction of symptoms.  -LB     LTG 3 Progress Ongoing  -LB           User Key  (r) = Recorded By, (t) = Taken By, (c) = Cosigned By     Initials Name Provider Type    LB Cheryl Stover, PT Physical Therapist                Therapy Education  Education Details: progressed HEP, continued to review posture with reading, sitting, modification of exercises at gym  Given: HEP, Symptoms/condition management, Mobility training, Pain management, Posture/body mechanics  Program: Reinforced  How Provided: Verbal, Demonstration, Written  Provided to: Patient  Level of Understanding: Teach back education performed, Demonstrated, Verbalized              Time Calculation:   Start Time: 0830  Stop Time: 0915  Time Calculation (min): 45 min  Total Timed Code Minutes- PT: 40 minute(s)  Timed Charges  69446 - PT Therapeutic Exercise Minutes: 30  30524 - PT Manual Therapy Minutes: 15  Total Minutes  Timed Charges Total Minutes: 15   Total Minutes: 15  Therapy Charges for Today     Code Description Service Date Service Provider Modifiers Qty    45404827817 HC PT MANUAL THERAPY EA 15 MIN 11/4/2022 Cheryl Stover, PT GP 1    78469611650 HC PT THER PROC EA 15 MIN 11/4/2022 Cheryl Stover, PT GP 2                    Cheryl Stover PT  11/4/2022

## 2022-11-14 ENCOUNTER — HOSPITAL ENCOUNTER (OUTPATIENT)
Dept: PHYSICAL THERAPY | Facility: HOSPITAL | Age: 57
Setting detail: THERAPIES SERIES
Discharge: HOME | End: 2022-11-14

## 2022-11-14 DIAGNOSIS — G89.29 CHRONIC LEFT SHOULDER PAIN: ICD-10-CM

## 2022-11-14 DIAGNOSIS — M54.2 NECK PAIN: Primary | ICD-10-CM

## 2022-11-14 DIAGNOSIS — M25.512 CHRONIC LEFT SHOULDER PAIN: ICD-10-CM

## 2022-11-14 PROCEDURE — 97110 THERAPEUTIC EXERCISES: CPT

## 2022-11-14 PROCEDURE — 97140 MANUAL THERAPY 1/> REGIONS: CPT

## 2022-11-14 NOTE — THERAPY PROGRESS REPORT/RE-CERT
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 (HCC)   • Chronic pain of left knee   • Acute pain of left knee   • Contusion of left knee   • Arthritis of left knee   • Chondromalacia, patella, left   • MGUS (monoclonal gammopathy of unknown significance)   • Arthritis of left hip   • Left hip pain   • Status post administration of cardiotoxic chemotherapy   • Precordial pain        Past Medical History:   Diagnosis Date   • Acquired hypothyroidism    • Anemia     History of anemia   • Arthritis     Knees   • Asthma    • Cancer (HCC) 2013    Left breast cancer, T2NM0   • Chest pain    • H/O Chondromalacia patellae, left    • H/O Regurgitation, mitral/triscupid, mild    • H/O Syncope w/collapse    • Heart murmur     Childhood   • History of chronic fatigue    • Hx of bladder infections    • Lymphedema    • Migraines    • MVP (mitral valve prolapse)    • Pneumonia         Past Surgical History:   Procedure Laterality Date   • BREAST BIOPSY Left 08/2013    x2    • BREAST CYST EXCISION Left     Fibroid cyst left nipple   • BREAST SURGERY Bilateral 2013    Mastectomy   • HYSTERECTOMY  2011    Partial   • KNEE ARTHROSCOPY Left    • PELVIC LAPAROSCOPY      cysts removed   • TOTAL HIP ARTHROPLASTY Left 2019       Visit Dx:     ICD-10-CM ICD-9-CM   1. Neck pain  M54.2 723.1   2. Chronic left shoulder pain  M25.512 719.41    G89.29 338.29                             Therapy Education  Given: HEP, Symptoms/condition management, Mobility training, Pain management, Posture/body mechanics  Program: Reinforced  How Provided: Verbal  Provided to: Patient  Level of Understanding: Verbalized      PT OP Goals     Row Name 22 0800          PT Short Term  Goals    STG Date to Achieve 10/21/22  -LB     STG 1 Pt will demonstrate understanding and compliance with initial HEP.  -LB     STG 1 Progress Met  -LB     STG 2 Pt will verbalize compliance with seated posture modifications to reduce FF position while watching TV and at night in bed while reading/phone use.  -LB     STG 2 Progress Met  -LB     STG 3 Pt will verbalize reduced pain post workout in neck by 50% with modifcations.  -LB     STG 3 Progress Ongoing  -LB     STG 3 Progress Comments Pt has not yet returned to gym exercise.  -LB        Long Term Goals    LTG Date to Achieve 11/06/22  -LB     LTG 1 Pt will demonstrate normal ROM flexion/extension without discomfort.  -LB     LTG 1 Progress Ongoing  -LB     LTG 2 Pt will deny neck pain with sleeping.  -LB     LTG 2 Progress Ongoing  -LB     LTG 3 Pt will demonstrate understanding of advanced HEP to promote continued reduction of symptoms.  -LB     LTG 3 Progress Ongoing  -LB           User Key  (r) = Recorded By, (t) = Taken By, (c) = Cosigned By    Initials Name Provider Type    Cheryl Dickinson, PT Physical Therapist                 PT Assessment/Plan     Row Name 11/14/22 1312          PT Assessment    Functional Limitations Limitations in functional capacity and performance;Performance in leisure activities;Limitations in community activities;Performance in sport activities  -LB     Impairments Impaired flexibility;Impaired lymphatic circulation;Impaired muscle length;Joint mobility;Muscle strength;Pain;Posture;Range of motion;Sensation  -LB     Assessment Comments Pt has participated in 3 skilled PT sessions to address B UT tightness, neck pain. She has made fair progress with skilled PT services to date meeting 1/3 STGs and making progress towards LTGs. She states she is much more aware of posture with seated tasks. She has not returned to gym exercise but is making adjustments to posture with walking to reduce UT tightness. Pt remains appropriate for  continued skilled PT services to address remaining tightness, pain, and postural impairments.  -LB     Rehab Potential Good  -LB     Patient/caregiver participated in establishment of treatment plan and goals Yes  -LB     Patient would benefit from skilled therapy intervention Yes  -LB        PT Plan    PT Frequency 1x/week;2x/week  -LB     Predicted Duration of Therapy Intervention (PT) 4-6 visits  -LB     Planned CPT's? PT RE-EVAL: 21387;PT THER PROC EA 15 MIN: 15130;PT THER ACT EA 15 MIN: 67672;PT MANUAL THERAPY EA 15 MIN: 99440;PT NEUROMUSC RE-EDUCATION EA 15 MIN: 94359;PT SELF CARE/HOME MGMT/TRAIN EA 15: 11390;PT TRACTION CERVICAL: 78317  -LB     PT Plan Comments consider single arm pec doorway, seated chin tuck  -LB           User Key  (r) = Recorded By, (t) = Taken By, (c) = Cosigned By    Initials Name Provider Type    LB Cheryl Stover, PT Physical Therapist                   OP Exercises     Row Name 11/14/22 0800 11/14/22 0700          Subjective Comments    Subjective Comments I am doing well, I am still tight but I am feeling pretty good today.  -LB --        Subjective Pain    Able to rate subjective pain? yes  -LB --     Pre-Treatment Pain Level 3  -LB --        Total Minutes    91552 - PT Therapeutic Exercise Minutes 30  -LB --     15995 - PT Manual Therapy Minutes -- 15  -LB        Exercise 1    Exercise Name 1 shoulder rolls  -LB --     Reps 1 10  -LB --        Exercise 2    Exercise Name 2 scap retraction  -LB --     Reps 2 10  -LB --     Time 2 5  -LB --        Exercise 3    Exercise Name 3 --  -LB --     Reps 3 --  -LB --     Time 3 --  -LB --        Exercise 4    Exercise Name 4 supine chin tuck  -LB --     Reps 4 15  -LB --     Time 4 5  -LB --        Exercise 5    Exercise Name 5 seated UT stretch  -LB --     Reps 5 3  -LB --     Time 5 20  -LB --        Exercise 6    Exercise Name 6 pulleys  -LB --     Reps 6 10  -LB --     Time 6 each  -LB --        Exercise 7    Exercise Name 7 tband  row/extension  -LB --     Sets 7 2  -LB --     Reps 7 10  -LB --     Time 7 RTB  -LB --        Exercise 8    Exercise Name 8 supine on pool noodle  -LB --     Reps 8 10  -LB --     Time 8 pec opening, B flexion with dowel  -LB --        Exercise 9    Exercise Name 9 SL upper trunk rotation  -LB --     Reps 9 10  -LB --        Exercise 10    Exercise Name 10 iso rotation  -LB --     Reps 10 10  -LB --     Time 10 5  -LB --     Additional Comments each; gentle  -LB --           User Key  (r) = Recorded By, (t) = Taken By, (c) = Cosigned By    Initials Name Provider Type    Cheryl Dickinson PT Physical Therapist              Manual Rx (last 36 hours)     Manual Treatments     Row Name 11/14/22 0700             Total Minutes    02352 - PT Manual Therapy Minutes 15  -LB         Manual Rx 1    Manual Rx 1 Location cervical spine, B UT  -LB      Manual Rx 1 Type STM to UT, gentle distraction, manual UT stretch  -LB      Manual Rx 1 Duration 15  -LB            User Key  (r) = Recorded By, (t) = Taken By, (c) = Cosigned By    Initials Name Provider Type    Cheryl Dickinson PT Physical Therapist                                      Time Calculation:     Start Time: 0830  Stop Time: 0915  Time Calculation (min): 45 min  Total Timed Code Minutes- PT: 40 minute(s)  Timed Charges  52062 - PT Therapeutic Exercise Minutes: 30  16555 - PT Manual Therapy Minutes: 15  Total Minutes  Timed Charges Total Minutes: 30   Total Minutes: 30     Therapy Charges for Today     Code Description Service Date Service Provider Modifiers Qty    46544898082 HC PT MANUAL THERAPY EA 15 MIN 11/14/2022 Cheryl Stover, PT GP 1    71588023118 HC PT THER PROC EA 15 MIN 11/14/2022 Cheryl Stover PT GP 2                    Cheryl Stover PT  11/14/2022

## 2022-11-18 ENCOUNTER — OFFICE (AMBULATORY)
Dept: URBAN - METROPOLITAN AREA CLINIC 76 | Facility: CLINIC | Age: 57
End: 2022-11-18

## 2022-11-18 VITALS
OXYGEN SATURATION: 100 % | HEIGHT: 72 IN | WEIGHT: 5 LBS | HEART RATE: 85 BPM | DIASTOLIC BLOOD PRESSURE: 86 MMHG | SYSTOLIC BLOOD PRESSURE: 132 MMHG

## 2022-11-18 DIAGNOSIS — R19.7 DIARRHEA, UNSPECIFIED: ICD-10-CM

## 2022-11-18 DIAGNOSIS — K52.89 OTHER SPECIFIED NONINFECTIVE GASTROENTERITIS AND COLITIS: ICD-10-CM

## 2022-11-18 DIAGNOSIS — K64.8 OTHER HEMORRHOIDS: ICD-10-CM

## 2022-11-18 PROCEDURE — 99214 OFFICE O/P EST MOD 30 MIN: CPT | Performed by: NURSE PRACTITIONER

## 2022-11-21 ENCOUNTER — HOSPITAL ENCOUNTER (OUTPATIENT)
Dept: PHYSICAL THERAPY | Facility: HOSPITAL | Age: 57
Setting detail: THERAPIES SERIES
Discharge: HOME | End: 2022-11-21

## 2022-11-21 DIAGNOSIS — M54.2 NECK PAIN: Primary | ICD-10-CM

## 2022-11-21 DIAGNOSIS — G89.29 CHRONIC LEFT SHOULDER PAIN: ICD-10-CM

## 2022-11-21 DIAGNOSIS — M25.512 CHRONIC LEFT SHOULDER PAIN: ICD-10-CM

## 2022-11-21 PROCEDURE — 97140 MANUAL THERAPY 1/> REGIONS: CPT

## 2022-11-21 PROCEDURE — 97110 THERAPEUTIC EXERCISES: CPT

## 2022-11-21 NOTE — THERAPY TREATMENT NOTE
Outpatient Physical Therapy Ortho Treatment Note  [facility]     Patient Name: Santos Witt  : 1965  MRN: [id number]  Today's Date: 2022      Visit Date: 2022    Visit Dx:    ICD-10-CM ICD-9-CM   1. Neck pain  M54.2 723.1   2. 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)   • Chronic pain of left knee   • Acute pain of left knee   • Contusion of left knee   • Arthritis of left knee   • Chondromalacia, patella, left   • MGUS (monoclonal gammopathy of unknown significance)   • Arthritis of left hip   • Left hip pain   • Status post administration of cardiotoxic chemotherapy   • Precordial pain        Past Medical History:   Diagnosis Date   • Acquired hypothyroidism    • Anemia     History of anemia   • Arthritis     Knees   • Asthma    • Cancer (HCC) 2013    Left breast cancer, T2NM0   • Chest pain    • H/O Chondromalacia patellae, left    • H/O Regurgitation, mitral/triscupid, mild    • H/O Syncope w/collapse    • Heart murmur     Childhood   • History of chronic fatigue    • Hx of bladder infections    • Lymphedema    • Migraines    • MVP (mitral valve prolapse)    • Pneumonia         Past Surgical History:   Procedure Laterality Date   • BREAST BIOPSY Left 08/2013    x2    • BREAST CYST EXCISION Left     Fibroid cyst left nipple   • BREAST SURGERY Bilateral 2013    Mastectomy   • HYSTERECTOMY  2011    Partial   • KNEE ARTHROSCOPY Left    • PELVIC LAPAROSCOPY      cysts removed   • TOTAL HIP ARTHROPLASTY Left                         PT Assessment/Plan     Row Name 22 1527          PT Assessment    Assessment Comments Pt with low pain levels today, reporting only stiffness in L shoulder/cervical spine. Continued with manual intervention and exercises emphasizing full cervical mobility and scapular strenghtening. Pt  reporting improved symptoms overall, however continues with difficuty sleeping. Pt also notes improved postural awareness in recent visits.  -CN        PT Plan    PT Plan Comments Continue to progress postural strengthening and cervical mobility as tolerated. May consider supine star next visit.  -CN           User Key  (r) = Recorded By, (t) = Taken By, (c) = Cosigned By    Initials Name Provider Type    CN Sultana Segovia, PT Physical Therapist                   OP Exercises     Row Name 11/21/22 0800 11/21/22 0700          Subjective Comments    Subjective Comments I feel good this morning, just tightness in the shoulder.  -CN --        Subjective Pain    Able to rate subjective pain? yes  -CN --     Pre-Treatment Pain Level 0  -CN --        Total Minutes    32790 - PT Therapeutic Exercise Minutes 25  -CN --     08194 - PT Manual Therapy Minutes -- 15  -CN        Exercise 1    Exercise Name 1 shoulder rolls  -CN --     Reps 1 10  -CN --        Exercise 2    Exercise Name 2 scap retraction  -CN --     Reps 2 10  -CN --     Time 2 5  -CN --        Exercise 3    Exercise Name 3 single arm doorway stretch  -CN --     Cueing 3 Verbal;Demo  -CN --     Reps 3 3  -CN --     Time 3 20 sec  -CN --        Exercise 4    Exercise Name 4 supine chin tuck  -CN --     Reps 4 15  -CN --     Time 4 5  -CN --        Exercise 5    Exercise Name 5 seated UT stretch  -CN --     Reps 5 3  -CN --     Time 5 20  -CN --        Exercise 6    Exercise Name 6 pulleys  -CN --     Reps 6 10  -CN --     Time 6 each  -CN --        Exercise 7    Exercise Name 7 tband row/extension  -CN --     Sets 7 2  -CN --     Reps 7 10  -CN --     Time 7 RTB  -CN --        Exercise 8    Exercise Name 8 supine on pool noodle  -CN --     Reps 8 10  -CN --     Time 8 pec opening, B flexion with dowel  -CN --        Exercise 9    Exercise Name 9 SL upper trunk rotation  -CN --     Reps 9 10  -CN --        Exercise 10    Exercise Name 10 iso rotation  -CN  --     Reps 10 10  -CN --     Time 10 5  -CN --     Additional Comments each; gentle  -CN --           User Key  (r) = Recorded By, (t) = Taken By, (c) = Cosigned By    Initials Name Provider Type    Sultana Pak, PT Physical Therapist                         Manual Rx (last 36 hours)     Manual Treatments     Row Name 11/21/22 0700             Total Minutes    36133 - PT Manual Therapy Minutes 15  -CN         Manual Rx 1    Manual Rx 1 Location cervical spine, B UT  -CN      Manual Rx 1 Type STM to UT, gentle distraction, manual UT stretch  -CN      Manual Rx 1 Duration 15  -CN            User Key  (r) = Recorded By, (t) = Taken By, (c) = Cosigned By    Initials Name Provider Type    Sultana Pak, PT Physical Therapist                 PT OP Goals     Row Name 11/21/22 0900          PT Short Term Goals    STG Date to Achieve 10/21/22  -CN     STG 1 Pt will demonstrate understanding and compliance with initial HEP.  -CN     STG 1 Progress Met  -CN     STG 2 Pt will verbalize compliance with seated posture modifications to reduce FF position while watching TV and at night in bed while reading/phone use.  -CN     STG 2 Progress Met  -CN     STG 3 Pt will verbalize reduced pain post workout in neck by 50% with modifcations.  -CN     STG 3 Progress Met  -CN     STG 3 Progress Comments Watching posture, no modifications. Slight soreness.  -CN        Long Term Goals    LTG Date to Achieve 11/06/22  -CN     LTG 1 Pt will demonstrate normal ROM flexion/extension without discomfort.  -CN     LTG 1 Progress Ongoing  -CN     LTG 2 Pt will deny neck pain with sleeping.  -CN     LTG 2 Progress Ongoing  -CN     LTG 3 Pt will demonstrate understanding of advanced HEP to promote continued reduction of symptoms.  -CN     LTG 3 Progress Ongoing  -CN           User Key  (r) = Recorded By, (t) = Taken By, (c) = Cosigned By    Initials Name Provider Type    Sultana Pak, PT Physical Therapist                 Therapy Education  Given: HEP, Symptoms/condition management, Mobility training, Pain management, Posture/body mechanics  Program: Reinforced  How Provided: Verbal  Provided to: Patient  Level of Understanding: Verbalized              Time Calculation:   Start Time: 0840  Stop Time: 0920  Time Calculation (min): 40 min  Timed Charges  71060 - PT Therapeutic Exercise Minutes: 25  88760 - PT Manual Therapy Minutes: 15  Total Minutes  Timed Charges Total Minutes: 25   Total Minutes: 25  Therapy Charges for Today     Code Description Service Date Service Provider Modifiers Qty    01181148044  PT THER PROC EA 15 MIN 11/21/2022 Sultana Segovia, PT GP 2    [id number]  PT MANUAL THERAPY EA 15 MIN 11/21/2022 Sultana Segovia, PT GP 1                    Sultana Segovia, PT  11/21/2022

## 2022-11-30 ENCOUNTER — HOSPITAL ENCOUNTER (OUTPATIENT)
Dept: PHYSICAL THERAPY | Facility: HOSPITAL | Age: 57
Setting detail: THERAPIES SERIES
Discharge: HOME | End: 2022-11-30

## 2022-11-30 DIAGNOSIS — M25.512 CHRONIC LEFT SHOULDER PAIN: ICD-10-CM

## 2022-11-30 DIAGNOSIS — M54.2 NECK PAIN: Primary | ICD-10-CM

## 2022-11-30 DIAGNOSIS — G89.29 CHRONIC LEFT SHOULDER PAIN: ICD-10-CM

## 2022-11-30 PROCEDURE — 97110 THERAPEUTIC EXERCISES: CPT

## 2022-11-30 PROCEDURE — 97140 MANUAL THERAPY 1/> REGIONS: CPT

## 2022-12-13 VITALS
SYSTOLIC BLOOD PRESSURE: 148 MMHG | DIASTOLIC BLOOD PRESSURE: 59 MMHG | HEART RATE: 84 BPM | SYSTOLIC BLOOD PRESSURE: 121 MMHG | RESPIRATION RATE: 10 BRPM | DIASTOLIC BLOOD PRESSURE: 87 MMHG | SYSTOLIC BLOOD PRESSURE: 108 MMHG | RESPIRATION RATE: 25 BRPM | SYSTOLIC BLOOD PRESSURE: 144 MMHG | DIASTOLIC BLOOD PRESSURE: 98 MMHG | DIASTOLIC BLOOD PRESSURE: 78 MMHG | DIASTOLIC BLOOD PRESSURE: 83 MMHG | SYSTOLIC BLOOD PRESSURE: 147 MMHG | RESPIRATION RATE: 16 BRPM | OXYGEN SATURATION: 100 % | SYSTOLIC BLOOD PRESSURE: 137 MMHG | DIASTOLIC BLOOD PRESSURE: 64 MMHG | DIASTOLIC BLOOD PRESSURE: 73 MMHG | HEART RATE: 82 BPM | HEART RATE: 65 BPM | RESPIRATION RATE: 11 BRPM | SYSTOLIC BLOOD PRESSURE: 120 MMHG | HEART RATE: 78 BPM | HEART RATE: 81 BPM | OXYGEN SATURATION: 97 % | HEART RATE: 83 BPM | SYSTOLIC BLOOD PRESSURE: 130 MMHG | OXYGEN SATURATION: 99 % | HEIGHT: 64 IN | HEART RATE: 69 BPM | TEMPERATURE: 97.6 F | RESPIRATION RATE: 15 BRPM | SYSTOLIC BLOOD PRESSURE: 145 MMHG | TEMPERATURE: 98.4 F | WEIGHT: 157 LBS | RESPIRATION RATE: 7 BRPM | HEART RATE: 63 BPM | DIASTOLIC BLOOD PRESSURE: 56 MMHG

## 2022-12-16 ENCOUNTER — AMBULATORY SURGICAL CENTER (AMBULATORY)
Dept: URBAN - METROPOLITAN AREA SURGERY 17 | Facility: SURGERY | Age: 57
End: 2022-12-16

## 2022-12-16 ENCOUNTER — OFFICE (AMBULATORY)
Dept: URBAN - METROPOLITAN AREA PATHOLOGY 4 | Facility: PATHOLOGY | Age: 57
End: 2022-12-16

## 2022-12-16 DIAGNOSIS — Z86.010 PERSONAL HISTORY OF COLONIC POLYPS: ICD-10-CM

## 2022-12-16 DIAGNOSIS — Z80.0 FAMILY HISTORY OF MALIGNANT NEOPLASM OF DIGESTIVE ORGANS: ICD-10-CM

## 2022-12-16 DIAGNOSIS — R19.7 DIARRHEA, UNSPECIFIED: ICD-10-CM

## 2022-12-16 DIAGNOSIS — R19.5 OTHER FECAL ABNORMALITIES: ICD-10-CM

## 2022-12-16 LAB
GI HISTOLOGY: A. UNSPECIFIED: (no result)
GI HISTOLOGY: B. UNSPECIFIED: (no result)
GI HISTOLOGY: PDF REPORT: (no result)

## 2022-12-16 PROCEDURE — 43235 EGD DIAGNOSTIC BRUSH WASH: CPT | Performed by: INTERNAL MEDICINE

## 2022-12-16 PROCEDURE — 88305 TISSUE EXAM BY PATHOLOGIST: CPT | Performed by: INTERNAL MEDICINE

## 2022-12-16 PROCEDURE — 45380 COLONOSCOPY AND BIOPSY: CPT | Performed by: INTERNAL MEDICINE

## 2023-02-16 ENCOUNTER — LAB (OUTPATIENT)
Dept: LAB | Facility: HOSPITAL | Age: 58
End: 2023-02-16
Payer: COMMERCIAL

## 2023-02-16 DIAGNOSIS — Z17.0 MALIGNANT NEOPLASM OF UPPER-OUTER QUADRANT OF BOTH BREASTS IN FEMALE, ESTROGEN RECEPTOR POSITIVE: ICD-10-CM

## 2023-02-16 DIAGNOSIS — D47.2 MGUS (MONOCLONAL GAMMOPATHY OF UNKNOWN SIGNIFICANCE): ICD-10-CM

## 2023-02-16 DIAGNOSIS — C50.412 MALIGNANT NEOPLASM OF UPPER-OUTER QUADRANT OF BOTH BREASTS IN FEMALE, ESTROGEN RECEPTOR POSITIVE: ICD-10-CM

## 2023-02-16 DIAGNOSIS — C50.411 MALIGNANT NEOPLASM OF UPPER-OUTER QUADRANT OF BOTH BREASTS IN FEMALE, ESTROGEN RECEPTOR POSITIVE: ICD-10-CM

## 2023-02-16 LAB
ALBUMIN SERPL-MCNC: 4.4 G/DL (ref 3.5–5.2)
ALBUMIN/GLOB SERPL: 1.3 G/DL (ref 1.1–2.4)
ALP SERPL-CCNC: 65 U/L (ref 38–116)
ALT SERPL W P-5'-P-CCNC: 18 U/L (ref 0–33)
ANION GAP SERPL CALCULATED.3IONS-SCNC: 13 MMOL/L (ref 5–15)
AST SERPL-CCNC: 26 U/L (ref 0–32)
B2 MICROGLOB SERPL-MCNC: 2.2 MG/L (ref 0.8–2.2)
BASOPHILS # BLD AUTO: 0.02 10*3/MM3 (ref 0–0.2)
BASOPHILS NFR BLD AUTO: 0.4 % (ref 0–1.5)
BILIRUB SERPL-MCNC: 0.3 MG/DL (ref 0.2–1.2)
BUN SERPL-MCNC: 20 MG/DL (ref 6–20)
BUN/CREAT SERPL: 23.3 (ref 7.3–30)
CALCIUM SPEC-SCNC: 9.3 MG/DL (ref 8.5–10.2)
CHLORIDE SERPL-SCNC: 101 MMOL/L (ref 98–107)
CO2 SERPL-SCNC: 22 MMOL/L (ref 22–29)
CREAT SERPL-MCNC: 0.86 MG/DL (ref 0.6–1.1)
DEPRECATED RDW RBC AUTO: 47.4 FL (ref 37–54)
EGFRCR SERPLBLD CKD-EPI 2021: 78.9 ML/MIN/1.73
EOSINOPHIL # BLD AUTO: 0.03 10*3/MM3 (ref 0–0.4)
EOSINOPHIL NFR BLD AUTO: 0.6 % (ref 0.3–6.2)
ERYTHROCYTE [DISTWIDTH] IN BLOOD BY AUTOMATED COUNT: 14.6 % (ref 12.3–15.4)
GLOBULIN UR ELPH-MCNC: 3.4 GM/DL (ref 1.8–3.5)
GLUCOSE SERPL-MCNC: 91 MG/DL (ref 74–124)
HCT VFR BLD AUTO: 33 % (ref 34–46.6)
HGB BLD-MCNC: 10.9 G/DL (ref 12–15.9)
IMM GRANULOCYTES # BLD AUTO: 0 10*3/MM3 (ref 0–0.05)
IMM GRANULOCYTES NFR BLD AUTO: 0 % (ref 0–0.5)
LYMPHOCYTES # BLD AUTO: 1.97 10*3/MM3 (ref 0.7–3.1)
LYMPHOCYTES NFR BLD AUTO: 39.6 % (ref 19.6–45.3)
MCH RBC QN AUTO: 29.1 PG (ref 26.6–33)
MCHC RBC AUTO-ENTMCNC: 33 G/DL (ref 31.5–35.7)
MCV RBC AUTO: 88 FL (ref 79–97)
MONOCYTES # BLD AUTO: 0.5 10*3/MM3 (ref 0.1–0.9)
MONOCYTES NFR BLD AUTO: 10.1 % (ref 5–12)
NEUTROPHILS NFR BLD AUTO: 2.45 10*3/MM3 (ref 1.7–7)
NEUTROPHILS NFR BLD AUTO: 49.3 % (ref 42.7–76)
NRBC BLD AUTO-RTO: 0 /100 WBC (ref 0–0.2)
PLATELET # BLD AUTO: 236 10*3/MM3 (ref 140–450)
PMV BLD AUTO: 8.8 FL (ref 6–12)
POTASSIUM SERPL-SCNC: 4 MMOL/L (ref 3.5–4.7)
PROT SERPL-MCNC: 7.8 G/DL (ref 6.3–8)
RBC # BLD AUTO: 3.75 10*6/MM3 (ref 3.77–5.28)
SODIUM SERPL-SCNC: 136 MMOL/L (ref 134–145)
WBC NRBC COR # BLD: 4.97 10*3/MM3 (ref 3.4–10.8)

## 2023-02-16 PROCEDURE — 85025 COMPLETE CBC W/AUTO DIFF WBC: CPT

## 2023-02-16 PROCEDURE — 80053 COMPREHEN METABOLIC PANEL: CPT

## 2023-02-16 PROCEDURE — 82232 ASSAY OF BETA-2 PROTEIN: CPT | Performed by: INTERNAL MEDICINE

## 2023-02-16 PROCEDURE — 36415 COLL VENOUS BLD VENIPUNCTURE: CPT

## 2023-02-17 LAB
ALBUMIN SERPL ELPH-MCNC: 3.8 G/DL (ref 2.9–4.4)
ALBUMIN/GLOB SERPL: 1.1 {RATIO} (ref 0.7–1.7)
ALPHA1 GLOB SERPL ELPH-MCNC: 0.2 G/DL (ref 0–0.4)
ALPHA2 GLOB SERPL ELPH-MCNC: 0.7 G/DL (ref 0.4–1)
B-GLOBULIN SERPL ELPH-MCNC: 1 G/DL (ref 0.7–1.3)
GAMMA GLOB SERPL ELPH-MCNC: 1.8 G/DL (ref 0.4–1.8)
GLOBULIN SER-MCNC: 3.7 G/DL (ref 2.2–3.9)
IGA SERPL-MCNC: 100 MG/DL (ref 87–352)
IGG SERPL-MCNC: 1962 MG/DL (ref 586–1602)
IGM SERPL-MCNC: 29 MG/DL (ref 26–217)
INTERPRETATION SERPL IEP-IMP: ABNORMAL
KAPPA LC FREE SER-MCNC: 118.7 MG/L (ref 3.3–19.4)
KAPPA LC FREE/LAMBDA FREE SER: 12.76 {RATIO} (ref 0.26–1.65)
LABORATORY COMMENT REPORT: ABNORMAL
LAMBDA LC FREE SERPL-MCNC: 9.3 MG/L (ref 5.7–26.3)
M PROTEIN SERPL ELPH-MCNC: 1.3 G/DL
PROT SERPL-MCNC: 7.5 G/DL (ref 6–8.5)

## 2023-02-23 ENCOUNTER — OFFICE VISIT (OUTPATIENT)
Dept: ONCOLOGY | Facility: CLINIC | Age: 58
End: 2023-02-23
Payer: COMMERCIAL

## 2023-02-23 VITALS
WEIGHT: 159.4 LBS | HEART RATE: 76 BPM | OXYGEN SATURATION: 98 % | TEMPERATURE: 97.5 F | HEIGHT: 63 IN | RESPIRATION RATE: 18 BRPM | BODY MASS INDEX: 28.24 KG/M2 | DIASTOLIC BLOOD PRESSURE: 77 MMHG | SYSTOLIC BLOOD PRESSURE: 117 MMHG

## 2023-02-23 DIAGNOSIS — C50.412 MALIGNANT NEOPLASM OF UPPER-OUTER QUADRANT OF BOTH BREASTS IN FEMALE, ESTROGEN RECEPTOR POSITIVE: Primary | ICD-10-CM

## 2023-02-23 DIAGNOSIS — C50.411 MALIGNANT NEOPLASM OF UPPER-OUTER QUADRANT OF BOTH BREASTS IN FEMALE, ESTROGEN RECEPTOR POSITIVE: Primary | ICD-10-CM

## 2023-02-23 DIAGNOSIS — Z17.0 MALIGNANT NEOPLASM OF UPPER-OUTER QUADRANT OF BOTH BREASTS IN FEMALE, ESTROGEN RECEPTOR POSITIVE: Primary | ICD-10-CM

## 2023-02-23 PROCEDURE — 99213 OFFICE O/P EST LOW 20 MIN: CPT | Performed by: INTERNAL MEDICINE

## 2023-02-28 ENCOUNTER — TELEPHONE (OUTPATIENT)
Dept: ONCOLOGY | Facility: CLINIC | Age: 58
End: 2023-02-28
Payer: COMMERCIAL

## 2023-02-28 DIAGNOSIS — C50.411 MALIGNANT NEOPLASM OF UPPER-OUTER QUADRANT OF BOTH BREASTS IN FEMALE, ESTROGEN RECEPTOR POSITIVE: Primary | ICD-10-CM

## 2023-02-28 DIAGNOSIS — M79.89 LEFT AXILLARY SWELLING: ICD-10-CM

## 2023-02-28 DIAGNOSIS — Z17.0 MALIGNANT NEOPLASM OF UPPER-OUTER QUADRANT OF BOTH BREASTS IN FEMALE, ESTROGEN RECEPTOR POSITIVE: Primary | ICD-10-CM

## 2023-02-28 DIAGNOSIS — C50.412 MALIGNANT NEOPLASM OF UPPER-OUTER QUADRANT OF BOTH BREASTS IN FEMALE, ESTROGEN RECEPTOR POSITIVE: Primary | ICD-10-CM

## 2023-03-01 NOTE — TELEPHONE ENCOUNTER
Returned call to patient and left detailed message on her cellphone number with information that an ultrasound of the area under her arm has been ordered and scheduled for 3/3/2023 at 0800.  I left my direct number if she needs more information.

## 2023-03-03 ENCOUNTER — HOSPITAL ENCOUNTER (OUTPATIENT)
Dept: ULTRASOUND IMAGING | Facility: HOSPITAL | Age: 58
Discharge: HOME | End: 2023-03-03
Admitting: INTERNAL MEDICINE
Payer: COMMERCIAL

## 2023-03-03 DIAGNOSIS — C50.411 MALIGNANT NEOPLASM OF UPPER-OUTER QUADRANT OF BOTH BREASTS IN FEMALE, ESTROGEN RECEPTOR POSITIVE: ICD-10-CM

## 2023-03-03 DIAGNOSIS — Z17.0 MALIGNANT NEOPLASM OF UPPER-OUTER QUADRANT OF BOTH BREASTS IN FEMALE, ESTROGEN RECEPTOR POSITIVE: ICD-10-CM

## 2023-03-03 DIAGNOSIS — M79.89 LEFT AXILLARY SWELLING: ICD-10-CM

## 2023-03-03 DIAGNOSIS — C50.412 MALIGNANT NEOPLASM OF UPPER-OUTER QUADRANT OF BOTH BREASTS IN FEMALE, ESTROGEN RECEPTOR POSITIVE: ICD-10-CM

## 2023-03-03 PROCEDURE — 76882 US LMTD JT/FCL EVL NVASC XTR: CPT

## 2023-03-07 ENCOUNTER — TELEPHONE (OUTPATIENT)
Dept: ONCOLOGY | Facility: CLINIC | Age: 58
End: 2023-03-07
Payer: COMMERCIAL

## 2023-03-07 NOTE — TELEPHONE ENCOUNTER
Returned call to Ms Witt.  Reviewed ultrasound report which was negative for any abnormality in the area where she is having pain.  She does report that she is still having pain in that area.  She has been taking about 4 extra strength tylenol each day.  She reports it is somewhat better but still there.  She also wondered if this may be lymphedema related.  I advised her that Dr Stinson and her nurse are out of the office until Thursday but I would review this with them upon their return and have them follow up.  She voiced understanding.

## 2023-03-13 ENCOUNTER — TELEPHONE (OUTPATIENT)
Dept: ONCOLOGY | Facility: CLINIC | Age: 58
End: 2023-03-13
Payer: COMMERCIAL

## 2023-03-13 NOTE — TELEPHONE ENCOUNTER
Called pt to see if her pain is improving. Ultrasound was negative for any abnormal findings. Left her a voicemail to call me back at my direct number [phone].

## 2023-03-30 NOTE — PROGRESS NOTES
Date of Office Visit: 2023  Encounter Provider: ROC Jean  Place of Service: [facility] CARDIOLOGY  Patient Name: Santos Witt  :1965    Chief complaint  Follow-up mitral valve prolapse, cardio oncology care, palpitations    History of Present Illness  Patient is a 58-year-old female patient of Dr. Fernandez.  Past medical history includes mitral valve prolapse, hypothyroidism, asthma, anemia and breast cancer followed by Dr. Stinson.  She was diagnosed with left-sided breast cancer in  treated with Adriamycin/Cytoxan/Taxol and Herceptin and left-sided radiation.  She had bilateral mastectomy and radiation therapy.  During chemotherapy ejection fraction fell from 63% to 53%.  Herceptin was held and subsequently resumed when ejection fraction improved to 64%.  Stress echocardiogram 2018 with an ejection fraction was 62% with no ischemia and no evidence of mitral prolapse.  She  was subsequently found to have MGUS which is felt to be stable.  In 2021 she was seen for dyspnea on exertion and stress echocardiogram showed normal systolic function ejection fraction 65% with GLS at -20.8% with mild left ventricular hypertrophy, diastolic function was indeterminate.  She had multiple small jets of mitral regurgitation felt to be overall mild without evidence of prolapse.  With trivial tricuspid regurgitation and normal right-sided pressures.  While patient had chest pain on the treadmill EKG and echo images were normal at 8 METS.  At visit in 2022 she complained of palpitations and intermittent chest pain.  Subsequent echocardiogram 10/5/2022 showed LVEF 65.1%, mild to moderate concentric LVH, normal diastolic function no significant valvular dysfunction and normal RVSP.  Holter monitor showed normal sinus rhythm with rare PACs and 2 episodes of supraventricular tachycardia with the longest lasting 4 beats.    Interval history  Patient presents today for  routine follow-up.  I will visit with her for the first time today and have reviewed her medical record.  Since last visit she has been doing well.  She denies palpitations, shortness of breath, edema, dizziness, chest pain or chest pressure, worsening fatigue, syncope or presyncope.  She is exercising regularly at the gym 3 days a week and walking daily.  She denies exertional symptoms.  Blood pressure today is at goal, and she also brought her home monitor to be evaluated.  Discussed proper blood pressure positioning and technique.  Discussed changing the batteries every 6 months or sooner if issues and also discussed having monitor checked regularly in a provider's office.  She is continuing to follow with Dr. Stinson for MGUS, and is currently seeing every 3 months.    Labs 2/16/2023 show hemoglobin 10.9, MCV 88, platelets 236.  CMP with creatinine 0.86, sodium 136, potassium 4.0, calcium 9.3.  AJYE showed IgG monoclonal protein with kappa specificity with M spike 1.3 and kappa lambda ratio 12.76  Past Medical History:   Diagnosis Date   • Acquired hypothyroidism    • Anemia     History of anemia   • Arthritis     Knees   • Asthma    • Cancer (HCC) 2013    Left breast cancer, T2NM0   • Chest pain    • H/O Chondromalacia patellae, left    • H/O Regurgitation, mitral/triscupid, mild    • H/O Syncope w/collapse    • Heart murmur     Childhood   • History of chronic fatigue    • Hx of bladder infections    • Lymphedema    • Migraines    • MVP (mitral valve prolapse)    • Pneumonia      Past Surgical History:   Procedure Laterality Date   • BREAST BIOPSY Left 08/2013    x2    • BREAST CYST EXCISION Left 1982    Fibroid cyst left nipple   • BREAST SURGERY Bilateral 09/2013    Mastectomy   • HYSTERECTOMY  06/2011    Partial   • KNEE ARTHROSCOPY Left 2006   • PELVIC LAPAROSCOPY  2002    cysts removed   • TOTAL HIP ARTHROPLASTY Left 2019     Outpatient Medications Prior to Visit   Medication Sig Dispense Refill   •  Acetaminophen 500 MG capsule Take  by mouth.     • albuterol sulfate  (90 Base) MCG/ACT inhaler Inhale 2 puffs Every 4 (Four) Hours As Needed for Wheezing.     • ascorbic acid (VITAMIN C) 500 MG tablet Take 2 tablets by mouth Daily.     • B Complex Vitamins (VITAMIN B COMPLEX) capsule capsule Take  by mouth Daily.     • Cholecalciferol (VITAMIN D3) 5000 UNITS capsule capsule Take 1 capsule by mouth 1 (One) Time Per Week.     • EPINEPHrine (EPIPEN) 0.3 MG/0.3ML solution auto-injector injection INJECT 0.3 MILLILITER (0.3 MG) BY INTRAMUSCULAR ROUTE ONCE AS NEEDED FOR ANAPHYLAXIS  0   • Fluticasone-Umeclidin-Vilant (Trelegy Ellipta) 200-62.5-25 MCG/ACT aerosol powder  Inhale 1 puff Daily.     • ipratropium-albuterol (DUO-NEB) 0.5-2.5 mg/3 ml nebulizer Take 3 mL by nebulization Every 4 (Four) Hours As Needed for Wheezing.     • levothyroxine (SYNTHROID, LEVOTHROID) 75 MCG tablet Take 1 tablet by mouth.     • LORATADINE PO Take  by mouth.     • magnesium oxide (MAGOX) 400 (241.3 Mg) MG tablet tablet Take 1 tablet by mouth Daily.     • mometasone (NASONEX) 50 MCG/ACT nasal spray mometasone 50 mcg/actuation nasal spray   SPRAY 2 SPRAYS IN EACH NOSTRIL BY INTRANASAL ROUTE ONCE DAILY     • Multiple Vitamin (MULTI-VITAMINS PO) Take  by mouth daily.     • Omega-3 Fatty Acids (OMEGA 3 500 PO) Take  by mouth.     • PATIENT SUPPLIED ALLERGY INJECTION Inject  under the skin into the appropriate area as directed. Biweekly     • Fluticasone-Salmeterol (ADVAIR HFA IN) 2 (two) times a day.       No facility-administered medications prior to visit.       Allergies as of 03/31/2023 - Reviewed 03/31/2023   Allergen Reaction Noted   • Ciprofloxacin Hives 03/15/2016   • Sulfa antibiotics Hives 03/15/2016   • Penicillins Nausea And Vomiting 03/18/2022   • Amoxicillin Nausea And Vomiting 03/15/2016     Social History     Socioeconomic History   • Marital status: Single   • Number of children: 0   • Years of education: High school  "  Tobacco Use   • Smoking status: Never   • Smokeless tobacco: Never   Substance and Sexual Activity   • Alcohol use: Yes     Comment: Very little   • Drug use: No   • Sexual activity: Defer     Family History   Problem Relation Age of Onset   • Kidney cancer Mother         Treated surgically   • Heart disease Mother    • Hypertension Mother    • Thyroid disease Mother    • Lung cancer Mother    • Heart attack Mother    • Hypertension Father    • Heart disease Father    • Heart attack Father    • Thyroid disease Sister    • No Known Problems Brother    • Breast cancer Paternal Aunt    • Heart disease Paternal Aunt    • Kidney cancer Paternal Uncle    • Colon cancer Paternal Grandfather         In his 60s   • Breast cancer Paternal Aunt    • Diabetes Maternal Aunt    • Pancreatic cancer Other    • Heart disease Maternal Grandmother    • Heart attack Maternal Grandmother    • Heart disease Paternal Grandmother    • Heart attack Paternal Grandmother      Review of Systems   Constitutional: Negative for malaise/fatigue.   Cardiovascular: Negative for chest pain, claudication, dyspnea on exertion, leg swelling, near-syncope, orthopnea, palpitations, paroxysmal nocturnal dyspnea and syncope.   Respiratory: Negative for shortness of breath.    Neurological: Negative for brief paralysis, dizziness, headaches and light-headedness.   All other systems reviewed and are negative.       Objective:     Vitals:    03/31/23 0844   BP: 118/82   Pulse: 63   Weight: 73.8 kg (162 lb 12.8 oz)   Height: 160 cm (62.99\")     Body mass index is 28.85 kg/m².    Vitals reviewed.   Constitutional:       General: Not in acute distress.     Appearance: Healthy appearance. Well-developed and not in distress. Not diaphoretic.   HENT:      Head: Normocephalic.   Pulmonary:      Effort: Pulmonary effort is normal. No respiratory distress.      Breath sounds: Normal breath sounds. No wheezing. No rhonchi. No rales.   Cardiovascular:      Normal rate. " Regular rhythm.      Murmurs: There is no murmur.   Pulses:     Radial: 2+ bilaterally.  Edema:     Peripheral edema absent.   Skin:     General: Skin is warm and dry. There is no cyanosis.      Findings: No rash.   Neurological:      Mental Status: Alert and oriented to person, place, and time.   Psychiatric:         Behavior: Behavior normal. Behavior is cooperative.         Thought Content: Thought content normal.         Judgment: Judgment normal.       Lab Review:     ECG 12 Lead    Date/Time: 3/31/2023 1:00 PM  Performed by: Cecily Marshall APRN  Authorized by: Cecily Marshall APRN   Comparison: compared with previous ECG   Similar to previous ECG  Rhythm: sinus rhythm  Rate: normal  BPM: 63  QRS axis: normal  Other findings: left ventricular hypertrophy  Comments: Similar to prior.  No new ischemic changes          Assessment:       Diagnosis Plan   1. Status post administration of cardiotoxic chemotherapy  Adult Transthoracic Echo Complete w/ Color, Spectral and Contrast if Necessary Per Protocol      2. Precordial pain        3. Palpitations        4. PALMA (dyspnea on exertion)          Plan:       1.  History of left-sided breast cancer treated with AC/Taxol and Herceptin and left-sided radiation that was completed in 2014.  There has been no evidence of cardiotoxicity by subsequent echoes including the last one 10/2022.  We will repeat echo at time of next visit.  2.  Chest pain and palpitations.  This does not sound anginal in nature.  Nocturnal episode is suspicious for arrhythmogenic etiology.    Holter monitor showed sinus rhythm with rare PACs.  3.  Probable sleep apnea.  I continue to recommend home sleep study.  She will call if she decides to pursue this.  3.  History of GE reflux disease  4.  Family history of premature atherosclerotic disease      Time Spent: I spent 30 minutes caring for Santos on this date of service. This time includes time spent by me in the following activities:  preparing for the visit, reviewing tests, performing a medically appropriate examination and/or evaluation, counseling and educating the patient/family/caregiver, ordering medications, tests, or procedures and documenting information in the medical record.   I spent 1 minutes on the separately reported service of ECG. This time is not included in the time used to support the E/M service also reported today.        Your medication list          Accurate as of March 31, 2023  1:00 PM. If you have any questions, ask your nurse or doctor.            CONTINUE taking these medications      Instructions Last Dose Given Next Dose Due   Acetaminophen 500 MG capsule      Take  by mouth.       albuterol sulfate  (90 Base) MCG/ACT inhaler  Commonly known as: PROVENTIL HFA;VENTOLIN HFA;PROAIR HFA      Inhale 2 puffs Every 4 (Four) Hours As Needed for Wheezing.       ascorbic acid 500 MG tablet  Commonly known as: VITAMIN C      Take 2 tablets by mouth Daily.       EPINEPHrine 0.3 MG/0.3ML solution auto-injector injection  Commonly known as: EPIPEN      INJECT 0.3 MILLILITER (0.3 MG) BY INTRAMUSCULAR ROUTE ONCE AS NEEDED FOR ANAPHYLAXIS       ipratropium-albuterol 0.5-2.5 mg/3 ml nebulizer  Commonly known as: DUO-NEB      Take 3 mL by nebulization Every 4 (Four) Hours As Needed for Wheezing.       levothyroxine 75 MCG tablet  Commonly known as: SYNTHROID, LEVOTHROID      Take 1 tablet by mouth.       LORATADINE PO      Take  by mouth.       magnesium oxide 400 (241.3 Mg) MG tablet tablet  Commonly known as: MAGOX      Take 1 tablet by mouth Daily.       mometasone 50 MCG/ACT nasal spray  Commonly known as: NASONEX      mometasone 50 mcg/actuation nasal spray   SPRAY 2 SPRAYS IN EACH NOSTRIL BY INTRANASAL ROUTE ONCE DAILY       multivitamin tablet tablet  Commonly known as: THERAGRAN      Take  by mouth daily.       OMEGA 3 500 PO      Take  by mouth.       PATIENT SUPPLIED ALLERGY INJECTION      Inject  under the skin into  the appropriate area as directed. Biweekly       Trelegy Ellipta 200-62.5-25 MCG/ACT aerosol powder   Generic drug: Fluticasone-Umeclidin-Vilant      Inhale 1 puff Daily.       vitamin b complex capsule capsule      Take  by mouth Daily.       vitamin D3 125 MCG (5000 UT) capsule capsule      Take 1 capsule by mouth 1 (One) Time Per Week.              Patient is no longer taking -.  I corrected the med list to reflect this.  I did not stop these medications.      Dictated utilizing Dragon dictation

## 2023-03-31 ENCOUNTER — OFFICE VISIT (OUTPATIENT)
Dept: CARDIOLOGY | Facility: CLINIC | Age: 58
End: 2023-03-31
Payer: COMMERCIAL

## 2023-03-31 VITALS
HEIGHT: 63 IN | DIASTOLIC BLOOD PRESSURE: 82 MMHG | HEART RATE: 63 BPM | BODY MASS INDEX: 28.84 KG/M2 | WEIGHT: 162.8 LBS | SYSTOLIC BLOOD PRESSURE: 118 MMHG

## 2023-03-31 DIAGNOSIS — R06.09 DOE (DYSPNEA ON EXERTION): ICD-10-CM

## 2023-03-31 DIAGNOSIS — R00.2 PALPITATIONS: ICD-10-CM

## 2023-03-31 DIAGNOSIS — R07.2 PRECORDIAL PAIN: ICD-10-CM

## 2023-03-31 DIAGNOSIS — Z92.21 STATUS POST ADMINISTRATION OF CARDIOTOXIC CHEMOTHERAPY: Primary | ICD-10-CM

## 2023-03-31 RX ORDER — FLUTICASONE FUROATE, UMECLIDINIUM BROMIDE AND VILANTEROL TRIFENATATE 200; 62.5; 25 UG/1; UG/1; UG/1
1 POWDER RESPIRATORY (INHALATION) DAILY
COMMUNITY
Start: 2023-03-07 | End: 2023-06-05

## 2023-04-18 ENCOUNTER — HOSPITAL ENCOUNTER (EMERGENCY)
Facility: HOSPITAL | Age: 58
Discharge: HOME | End: 2023-04-18
Attending: EMERGENCY MEDICINE | Admitting: EMERGENCY MEDICINE
Payer: COMMERCIAL

## 2023-04-18 ENCOUNTER — APPOINTMENT (OUTPATIENT)
Dept: GENERAL RADIOLOGY | Facility: HOSPITAL | Age: 58
End: 2023-04-18
Payer: COMMERCIAL

## 2023-04-18 VITALS
OXYGEN SATURATION: 99 % | BODY MASS INDEX: 28.35 KG/M2 | HEART RATE: 72 BPM | RESPIRATION RATE: 18 BRPM | DIASTOLIC BLOOD PRESSURE: 70 MMHG | HEIGHT: 63 IN | TEMPERATURE: 97.3 F | SYSTOLIC BLOOD PRESSURE: 122 MMHG | WEIGHT: 160 LBS

## 2023-04-18 DIAGNOSIS — R55 VASOVAGAL NEAR SYNCOPE: Primary | ICD-10-CM

## 2023-04-18 LAB
ALBUMIN SERPL-MCNC: 4.1 G/DL (ref 3.5–5.2)
ALBUMIN/GLOB SERPL: 1.2 G/DL
ALP SERPL-CCNC: 64 U/L (ref 39–117)
ALT SERPL W P-5'-P-CCNC: 18 U/L (ref 1–33)
ANION GAP SERPL CALCULATED.3IONS-SCNC: 12 MMOL/L (ref 5–15)
AST SERPL-CCNC: 24 U/L (ref 1–32)
BASOPHILS # BLD AUTO: 0.01 10*3/MM3 (ref 0–0.2)
BASOPHILS NFR BLD AUTO: 0.2 % (ref 0–1.5)
BILIRUB SERPL-MCNC: 0.3 MG/DL (ref 0–1.2)
BUN SERPL-MCNC: 16 MG/DL (ref 6–20)
BUN/CREAT SERPL: 15.8 (ref 7–25)
CALCIUM SPEC-SCNC: 8.6 MG/DL (ref 8.6–10.5)
CHLORIDE SERPL-SCNC: 105 MMOL/L (ref 98–107)
CO2 SERPL-SCNC: 24 MMOL/L (ref 22–29)
CREAT SERPL-MCNC: 1.01 MG/DL (ref 0.57–1)
DEPRECATED RDW RBC AUTO: 42.5 FL (ref 37–54)
EGFRCR SERPLBLD CKD-EPI 2021: 64.7 ML/MIN/1.73
EOSINOPHIL # BLD AUTO: 0.02 10*3/MM3 (ref 0–0.4)
EOSINOPHIL NFR BLD AUTO: 0.3 % (ref 0.3–6.2)
ERYTHROCYTE [DISTWIDTH] IN BLOOD BY AUTOMATED COUNT: 13 % (ref 12.3–15.4)
GLOBULIN UR ELPH-MCNC: 3.3 GM/DL
GLUCOSE SERPL-MCNC: 94 MG/DL (ref 65–99)
HCT VFR BLD AUTO: 34.2 % (ref 34–46.6)
HGB BLD-MCNC: 11.6 G/DL (ref 12–15.9)
HOLD SPECIMEN: NORMAL
HOLD SPECIMEN: NORMAL
IMM GRANULOCYTES # BLD AUTO: 0.02 10*3/MM3 (ref 0–0.05)
IMM GRANULOCYTES NFR BLD AUTO: 0.3 % (ref 0–0.5)
LYMPHOCYTES # BLD AUTO: 1.02 10*3/MM3 (ref 0.7–3.1)
LYMPHOCYTES NFR BLD AUTO: 17.1 % (ref 19.6–45.3)
MAGNESIUM SERPL-MCNC: 2.2 MG/DL (ref 1.6–2.6)
MCH RBC QN AUTO: 30.4 PG (ref 26.6–33)
MCHC RBC AUTO-ENTMCNC: 33.9 G/DL (ref 31.5–35.7)
MCV RBC AUTO: 89.8 FL (ref 79–97)
MONOCYTES # BLD AUTO: 0.47 10*3/MM3 (ref 0.1–0.9)
MONOCYTES NFR BLD AUTO: 7.9 % (ref 5–12)
NEUTROPHILS NFR BLD AUTO: 4.44 10*3/MM3 (ref 1.7–7)
NEUTROPHILS NFR BLD AUTO: 74.2 % (ref 42.7–76)
NRBC BLD AUTO-RTO: 0 /100 WBC (ref 0–0.2)
PLATELET # BLD AUTO: 220 10*3/MM3 (ref 140–450)
PMV BLD AUTO: 9.3 FL (ref 6–12)
POTASSIUM SERPL-SCNC: 3.6 MMOL/L (ref 3.5–5.2)
PROT SERPL-MCNC: 7.4 G/DL (ref 6–8.5)
QT INTERVAL: 398 MS
RBC # BLD AUTO: 3.81 10*6/MM3 (ref 3.77–5.28)
SODIUM SERPL-SCNC: 141 MMOL/L (ref 136–145)
TROPONIN T SERPL HS-MCNC: <6 NG/L
WBC NRBC COR # BLD: 5.98 10*3/MM3 (ref 3.4–10.8)
WHOLE BLOOD HOLD COAG: NORMAL
WHOLE BLOOD HOLD SPECIMEN: NORMAL

## 2023-04-18 PROCEDURE — 99284 EMERGENCY DEPT VISIT MOD MDM: CPT

## 2023-04-18 PROCEDURE — 93005 ELECTROCARDIOGRAM TRACING: CPT

## 2023-04-18 PROCEDURE — 36415 COLL VENOUS BLD VENIPUNCTURE: CPT

## 2023-04-18 PROCEDURE — 83735 ASSAY OF MAGNESIUM: CPT

## 2023-04-18 PROCEDURE — 85025 COMPLETE CBC W/AUTO DIFF WBC: CPT

## 2023-04-18 PROCEDURE — 84484 ASSAY OF TROPONIN QUANT: CPT

## 2023-04-18 PROCEDURE — 93010 ELECTROCARDIOGRAM REPORT: CPT | Performed by: INTERNAL MEDICINE

## 2023-04-18 PROCEDURE — 71045 X-RAY EXAM CHEST 1 VIEW: CPT

## 2023-04-18 PROCEDURE — 80053 COMPREHEN METABOLIC PANEL: CPT

## 2023-04-18 PROCEDURE — 25010000002 ONDANSETRON PER 1 MG: Performed by: EMERGENCY MEDICINE

## 2023-04-18 PROCEDURE — 96374 THER/PROPH/DIAG INJ IV PUSH: CPT

## 2023-04-18 RX ORDER — SODIUM CHLORIDE 0.9 % (FLUSH) 0.9 %
10 SYRINGE (ML) INJECTION AS NEEDED
Status: DISCONTINUED | OUTPATIENT
Start: 2023-04-18 | End: 2023-04-18 | Stop reason: HOSPADM

## 2023-04-18 RX ORDER — ONDANSETRON 2 MG/ML
4 INJECTION INTRAMUSCULAR; INTRAVENOUS ONCE
Status: COMPLETED | OUTPATIENT
Start: 2023-04-18 | End: 2023-04-18

## 2023-04-18 RX ADMIN — ONDANSETRON 4 MG: 2 INJECTION INTRAMUSCULAR; INTRAVENOUS at 19:23

## 2023-04-18 RX ADMIN — SODIUM CHLORIDE 1000 ML: 9 INJECTION, SOLUTION INTRAVENOUS at 19:22

## 2023-04-18 NOTE — ED PROVIDER NOTES
EMERGENCY DEPARTMENT ENCOUNTER    Room Number:  25/25  Date seen:  4/18/2023  PCP: Sheridan Richardson MD  Historian: Patient      HPI:  Chief Complaint: Near syncope    A complete HPI/ROS/PMH/PSH/SH/FH are unobtainable due to: N/A    Context: Santos Witt is a 58 y.o. female who presents to the ED c/o a near syncopal episode that happened this afternoon around 3:00 PM.  She was out with some of her friends eating lunch at a restaurant when she felt weak, became diaphoretic and had an urge to defecate.  She was nauseated as well.  She said her head down on the table and symptoms eventually resolved.  She did not lose consciousness completely.  She did not fall or injure herself.  She has had several of these episodes in the past, however the most recent episode was 4 to 5 years ago.  She reports no recent black or bloody stools.  No fevers or chills.  No chest pain, shortness of breath or difficulty breathing.  She has been in her usual state of health recently.      Additional sources:  - Discussed/ obtained information from independent historians: No-she is unaccompanied    - External (non-ED) record review: The patient has a history of breast cancer, followed by Dr. Stinson.  She underwent bilateral mastectomy with radiation treatment and chemotherapy with Adriamycin, Cytoxan, Taxol and Herceptin.  She was last seen by cardiology for follow-up due to reduced EF while on chemotherapy on 3/31/2023.  Review of office note at that time states that she was doing well and was without palpitations, shortness of breath, edema, dizziness, chest pain, fatigue syncope or presyncope..    - Chronic or social conditions impacting care: None of which I am aware    PAST MEDICAL HISTORY  Active Ambulatory Problems     Diagnosis Date Noted   • Bilateral malignant neoplasm of upper outer quadrant of breast in female 04/15/2016   • Chronic pain of left knee 01/11/2018   • Acute pain of left knee 01/11/2018   • Contusion of  left knee 01/11/2018   • Arthritis of left knee 02/09/2018   • Chondromalacia, patella, left 02/09/2018   • MGUS (monoclonal gammopathy of unknown significance) 06/08/2018   • Arthritis of left hip 01/16/2019   • Left hip pain 01/16/2019   • Status post administration of cardiotoxic chemotherapy 06/23/2021   • Precordial pain 06/23/2021     Resolved Ambulatory Problems     Diagnosis Date Noted   • No Resolved Ambulatory Problems     Past Medical History:   Diagnosis Date   • Acquired hypothyroidism    • Anemia    • Arthritis    • Asthma    • Cancer 2013   • Chest pain    • H/O Chondromalacia patellae, left    • H/O Regurgitation, mitral/triscupid, mild    • H/O Syncope w/collapse    • Heart murmur    • History of chronic fatigue    • Hx of bladder infections    • Lymphedema    • Migraines    • MVP (mitral valve prolapse)    • Pneumonia          PAST SURGICAL HISTORY  Past Surgical History:   Procedure Laterality Date   • BREAST BIOPSY Left 08/2013    x2    • BREAST CYST EXCISION Left 1982    Fibroid cyst left nipple   • BREAST SURGERY Bilateral 09/2013    Mastectomy   • HYSTERECTOMY  06/2011    Partial   • KNEE ARTHROSCOPY Left 2006   • PELVIC LAPAROSCOPY  2002    cysts removed   • TOTAL HIP ARTHROPLASTY Left 2019         FAMILY HISTORY  Family History   Problem Relation Age of Onset   • Kidney cancer Mother         Treated surgically   • Heart disease Mother    • Hypertension Mother    • Thyroid disease Mother    • Lung cancer Mother    • Heart attack Mother    • Hypertension Father    • Heart disease Father    • Heart attack Father    • Thyroid disease Sister    • No Known Problems Brother    • Breast cancer Paternal Aunt    • Heart disease Paternal Aunt    • Kidney cancer Paternal Uncle    • Colon cancer Paternal Grandfather         In his 60s   • Breast cancer Paternal Aunt    • Diabetes Maternal Aunt    • Pancreatic cancer Other    • Heart disease Maternal Grandmother    • Heart attack Maternal Grandmother    •  Heart disease Paternal Grandmother    • Heart attack Paternal Grandmother          SOCIAL HISTORY  Social History     Socioeconomic History   • Marital status: Single   • Number of children: 0   • Years of education: High school   Tobacco Use   • Smoking status: Never   • Smokeless tobacco: Never   Substance and Sexual Activity   • Alcohol use: Yes     Comment: Very little   • Drug use: No   • Sexual activity: Defer         ALLERGIES  Ciprofloxacin, Sulfa antibiotics, Penicillins, and Amoxicillin        REVIEW OF SYSTEMS  Review of Systems   Constitutional: Positive for diaphoresis.   Respiratory: Negative for shortness of breath.    Cardiovascular: Negative for chest pain.   Gastrointestinal: Positive for nausea. Negative for blood in stool, diarrhea and vomiting.   Neurological: Positive for syncope and weakness.            PHYSICAL EXAM  ED Triage Vitals [04/18/23 1556]   Temp Heart Rate Resp BP SpO2   97.3 °F (36.3 °C) 75 18 111/54 98 %      Temp src Heart Rate Source Patient Position BP Location FiO2 (%)   Oral Monitor Lying Right arm --       Physical Exam      Physical Exam   Constitutional: Pt. is oriented to person, place, and time.  She is well-developed, well-nourished, and in no distress.    HENT: Normocephalic and atraumatic. Pupils are equal, round, and reactive to light.   Neck: Normal range of motion. Neck supple. No JVD present. No tracheal deviation present.   Cardiovascular: Normal rate, regular rhythm and normal heart sounds.   Pulmonary/Chest: Effort normal and breath sounds normal. No stridor. No respiratory distress. No wheezes, no rales.   Abdominal: Soft.  No distension. There is no tenderness. There is no rebound and no guarding.   Musculoskeletal: No edema, tenderness or deformity.   Neurological: She is alert and oriented to person, place, and time.  She has no focal neurologic deficits.  Skin: Skin is warm and dry. Pt. is not diaphoretic.  Psychiatric: Mood, affect normal.  She is  pleasant and cooperative.  Nursing note and vitals reviewed.            LAB RESULTS  Recent Results (from the past 24 hour(s))   Comprehensive Metabolic Panel    Collection Time: 04/18/23  5:06 PM    Specimen: Blood   Result Value Ref Range    Glucose 94 65 - 99 mg/dL    BUN 16 6 - 20 mg/dL    Creatinine 1.01 (H) 0.57 - 1.00 mg/dL    Sodium 141 136 - 145 mmol/L    Potassium 3.6 3.5 - 5.2 mmol/L    Chloride 105 98 - 107 mmol/L    CO2 24.0 22.0 - 29.0 mmol/L    Calcium 8.6 8.6 - 10.5 mg/dL    Total Protein 7.4 6.0 - 8.5 g/dL    Albumin 4.1 3.5 - 5.2 g/dL    ALT (SGPT) 18 1 - 33 U/L    AST (SGOT) 24 1 - 32 U/L    Alkaline Phosphatase 64 39 - 117 U/L    Total Bilirubin 0.3 0.0 - 1.2 mg/dL    Globulin 3.3 gm/dL    A/G Ratio 1.2 g/dL    BUN/Creatinine Ratio 15.8 7.0 - 25.0    Anion Gap 12.0 5.0 - 15.0 mmol/L    eGFR 64.7 >60.0 mL/min/1.73   Single High Sensitivity Troponin T    Collection Time: 04/18/23  5:06 PM    Specimen: Blood   Result Value Ref Range    HS Troponin T <6 <10 ng/L   Magnesium    Collection Time: 04/18/23  5:06 PM    Specimen: Blood   Result Value Ref Range    Magnesium 2.2 1.6 - 2.6 mg/dL   Green Top (Gel)    Collection Time: 04/18/23  5:06 PM   Result Value Ref Range    Extra Tube Hold for add-ons.    Lavender Top    Collection Time: 04/18/23  5:06 PM   Result Value Ref Range    Extra Tube hold for add-on    Gold Top - SST    Collection Time: 04/18/23  5:06 PM   Result Value Ref Range    Extra Tube Hold for add-ons.    Light Blue Top    Collection Time: 04/18/23  5:06 PM   Result Value Ref Range    Extra Tube Hold for add-ons.    CBC Auto Differential    Collection Time: 04/18/23  5:06 PM    Specimen: Blood   Result Value Ref Range    WBC 5.98 3.40 - 10.80 10*3/mm3    RBC 3.81 3.77 - 5.28 10*6/mm3    Hemoglobin 11.6 (L) 12.0 - 15.9 g/dL    Hematocrit 34.2 34.0 - 46.6 %    MCV 89.8 79.0 - 97.0 fL    MCH 30.4 26.6 - 33.0 pg    MCHC 33.9 31.5 - 35.7 g/dL    RDW 13.0 12.3 - 15.4 %    RDW-SD 42.5 37.0 -  54.0 fl    MPV 9.3 6.0 - 12.0 fL    Platelets 220 140 - 450 10*3/mm3    Neutrophil % 74.2 42.7 - 76.0 %    Lymphocyte % 17.1 (L) 19.6 - 45.3 %    Monocyte % 7.9 5.0 - 12.0 %    Eosinophil % 0.3 0.3 - 6.2 %    Basophil % 0.2 0.0 - 1.5 %    Immature Grans % 0.3 0.0 - 0.5 %    Neutrophils, Absolute 4.44 1.70 - 7.00 10*3/mm3    Lymphocytes, Absolute 1.02 0.70 - 3.10 10*3/mm3    Monocytes, Absolute 0.47 0.10 - 0.90 10*3/mm3    Eosinophils, Absolute 0.02 0.00 - 0.40 10*3/mm3    Basophils, Absolute 0.01 0.00 - 0.20 10*3/mm3    Immature Grans, Absolute 0.02 0.00 - 0.05 10*3/mm3    nRBC 0.0 0.0 - 0.2 /100 WBC   ECG 12 Lead ED Triage Standing Order; Weak / Dizzy / AMS    Collection Time: 04/18/23  6:48 PM   Result Value Ref Range    QT Interval 398 ms       Ordered the above labs and reviewed the results.        RADIOLOGY  XR Chest 1 View    Result Date: 4/18/2023  XR CHEST 1 VW-clinical: Syncope  COMPARISON examination 02/02/2022  FINDINGS: Heart size upper limits of normal. No mediastinal or hilar abnormality has developed. Small calcified benign granuloma again demonstrated within the right midlung zone. No pleural effusion, vascular congestion or acute airspace disease has developed.  CONCLUSION: No active disease of the chest  This report was finalized on 4/18/2023 5:07 PM by Dr. Jean Carlos Daly M.D.        Ordered the above noted radiological studies. Reviewed by me in PACS.        PROCEDURES  Procedures      MEDICATIONS GIVEN IN ER  Medications   sodium chloride 0.9 % flush 10 mL (has no administration in time range)   sodium chloride 0.9 % bolus 1,000 mL (1,000 mL Intravenous New Bag 4/18/23 1922)   ondansetron (ZOFRAN) injection 4 mg (4 mg Intravenous Given 4/18/23 1923)             MEDICAL DECISION MAKING, PROGRESS, and CONSULTS    All labs have been independently reviewed by me.  All radiology studies have been reviewed by me and discussed with radiologist dictating the report.   EKG's independently viewed and  interpreted by me.  Discussion below represents my analysis of pertinent findings related to patient's condition, differential diagnosis, treatment plan and final disposition.      Orders placed during this visit:  Orders Placed This Encounter   Procedures   • XR Chest 1 View   • [place] Draw   • Comprehensive Metabolic Panel   • Single High Sensitivity Troponin T   • Magnesium   • CBC Auto Differential   • NPO Diet NPO Type: Strict NPO   • Undress & Gown   • Cardiac Monitoring   • Continuous Pulse Oximetry   • Vital Signs   • Orthostatic Blood Pressure   • Orthostatic Vitals   • Oxygen Therapy- Nasal Cannula; 2 LPM; Titrate for SPO2: 92%, Greater Than or Equal To   • POC Glucose Once   • ECG 12 Lead ED Triage Standing Order; Weak / Dizzy / AMS   • Insert Peripheral IV   • Fall Precautions   • CBC & Differential   • Green Top (Gel)   • Lavender Top   • Gold Top - SST   • Light Blue Top       Additional orders considered but not ordered:  N/A    Differential diagnosis:  Differential diagnosis for syncope/dizziness includes but is not limited to:  Vasovagal reflex - situational stimulus, micturition, defecation, cough, sneezing, swallowing, postprandial state, react sinus hypersensitivity  Vascular-prolonged recumbency, sudden postural change, prolonged standing, hypovolemia, vasodilator drugs, autonomic neuropathy, adrenal insufficiency, subclavian steal, pulmonary embolism  Cardiac -arrhythmia, heart block, myocardial infarction, aortic stenosis, cardiac myxoma, cardiac, LV Dysfunction, Aortic Dissection, Pulmonary Hypertension, Pulmonary Stenosis, Pacemaker Failure  CNS-seizure, hypoxia, hypoglycemia, TIA,(basal vertebral), hydrocephalus, vertebrobasilar insufficiency, vertigo      Independent interpretation of labs, radiology studies, and discussions with consultants:  ED Course as of 04/18/23 2001 Tue Apr 18, 2023   1840 Chest X-ray was independently visualized and preliminarily interpreted by me.  I see no  infiltrates or effusions. CXR was discussed with/officially interpreted by Dr. Daly (radiology)-no acute processes are identified.  For official interpretation, see dictated report. [WC]   1855 EKG performed at 1848 interpreted by me shows normal sinus rhythm with a rate of 70 bpm.  MI intervals are normal.  There is left ventricular hypertrophy.  ST-T segments are unremarkable.  No significant change when compared to 12/19/2014. [WC]      ED Course User Index  [WC] Santos Tobias MD              Appropriate PPE was worn by myself and the patient throughout our entire interaction.    DIAGNOSIS  Final diagnoses:   Vasovagal near syncope         DISPOSITION  Discharged            Latest Documented Vital Signs:  As of 20:01 EDT  BP- 147/99 HR- 79 Temp- 97.3 °F (36.3 °C) (Oral) O2 sat- 98%        --    Please note that portions of this were completed with a voice recognition program.       Note Disclaimer: At [facility], we believe that sharing information builds trust and better relationships. You are receiving this note because you are receiving care at [facility] or recently visited. It is possible you will see health information before a provider has talked with you about it. This kind of information can be easy to misunderstand. To help you fully understand what it means for your health, we urge you to discuss this note with your provider.           Santos Tobias MD  04/18/23 2002

## 2023-04-18 NOTE — ED NOTES
Pt to ed from [facility] via EMS    Pt was eating lunch and had sudden onset of feeling clammy and dizzy. bp was 70/40. Dizziness resolved when ems arrived. Bp now 111/54. PTA pt received 500 ml NS

## 2023-04-20 ENCOUNTER — TELEPHONE (OUTPATIENT)
Dept: CARDIOLOGY | Facility: CLINIC | Age: 58
End: 2023-04-20
Payer: COMMERCIAL

## 2023-04-20 NOTE — TELEPHONE ENCOUNTER
Pt called re: She was in the ER for a vasovagal event.  She was told to followup with us.    Pt did see Endocrinologist 4/11/2023 where they updated labs but no medication changes were made.      She want to know if she needs any workup or followup after this event.      Pt is completely asymptomatic at this time.  She also will monitor her BP closely.

## 2023-04-21 NOTE — TELEPHONE ENCOUNTER
Discussed with Dr. Fernandez. As she had stable echo and benign monitor in October 2022, no workup needed other than what was already completed in ER. She should watch her BP closely and make sure to stay well hydrated with the coming summer months.

## 2023-04-25 NOTE — TELEPHONE ENCOUNTER
Patient called back informing me that her b/p is fluxuating.  I informed patient to keep an eye on her b/p and HR and call us back in a week with readings.  I informed patient if her b/p drops low before then to call us sooner.

## 2023-05-18 ENCOUNTER — LAB (OUTPATIENT)
Dept: LAB | Facility: HOSPITAL | Age: 58
End: 2023-05-18
Payer: COMMERCIAL

## 2023-05-18 DIAGNOSIS — Z17.0 MALIGNANT NEOPLASM OF UPPER-OUTER QUADRANT OF BOTH BREASTS IN FEMALE, ESTROGEN RECEPTOR POSITIVE: ICD-10-CM

## 2023-05-18 DIAGNOSIS — C50.412 MALIGNANT NEOPLASM OF UPPER-OUTER QUADRANT OF BOTH BREASTS IN FEMALE, ESTROGEN RECEPTOR POSITIVE: ICD-10-CM

## 2023-05-18 DIAGNOSIS — C50.411 MALIGNANT NEOPLASM OF UPPER-OUTER QUADRANT OF BOTH BREASTS IN FEMALE, ESTROGEN RECEPTOR POSITIVE: ICD-10-CM

## 2023-05-18 LAB
B2 MICROGLOB SERPL-MCNC: 2.3 MG/L (ref 0.8–2.2)
BASOPHILS # BLD AUTO: 0.02 10*3/MM3 (ref 0–0.2)
BASOPHILS NFR BLD AUTO: 0.4 % (ref 0–1.5)
DEPRECATED RDW RBC AUTO: 50.7 FL (ref 37–54)
EOSINOPHIL # BLD AUTO: 0.02 10*3/MM3 (ref 0–0.4)
EOSINOPHIL NFR BLD AUTO: 0.4 % (ref 0.3–6.2)
ERYTHROCYTE [DISTWIDTH] IN BLOOD BY AUTOMATED COUNT: 14.8 % (ref 12.3–15.4)
HCT VFR BLD AUTO: 35.3 % (ref 34–46.6)
HGB BLD-MCNC: 11.5 G/DL (ref 12–15.9)
IMM GRANULOCYTES # BLD AUTO: 0.01 10*3/MM3 (ref 0–0.05)
IMM GRANULOCYTES NFR BLD AUTO: 0.2 % (ref 0–0.5)
LYMPHOCYTES # BLD AUTO: 2.12 10*3/MM3 (ref 0.7–3.1)
LYMPHOCYTES NFR BLD AUTO: 37.5 % (ref 19.6–45.3)
MCH RBC QN AUTO: 30.1 PG (ref 26.6–33)
MCHC RBC AUTO-ENTMCNC: 32.6 G/DL (ref 31.5–35.7)
MCV RBC AUTO: 92.4 FL (ref 79–97)
MONOCYTES # BLD AUTO: 0.42 10*3/MM3 (ref 0.1–0.9)
MONOCYTES NFR BLD AUTO: 7.4 % (ref 5–12)
NEUTROPHILS NFR BLD AUTO: 3.06 10*3/MM3 (ref 1.7–7)
NEUTROPHILS NFR BLD AUTO: 54.1 % (ref 42.7–76)
NRBC BLD AUTO-RTO: 0 /100 WBC (ref 0–0.2)
PLATELET # BLD AUTO: 249 10*3/MM3 (ref 140–450)
PMV BLD AUTO: 9.2 FL (ref 6–12)
RBC # BLD AUTO: 3.82 10*6/MM3 (ref 3.77–5.28)
WBC NRBC COR # BLD: 5.65 10*3/MM3 (ref 3.4–10.8)

## 2023-05-18 PROCEDURE — 36415 COLL VENOUS BLD VENIPUNCTURE: CPT

## 2023-05-18 PROCEDURE — 82232 ASSAY OF BETA-2 PROTEIN: CPT | Performed by: INTERNAL MEDICINE

## 2023-05-18 PROCEDURE — 85025 COMPLETE CBC W/AUTO DIFF WBC: CPT

## 2023-05-19 LAB
ALBUMIN SERPL ELPH-MCNC: 3.9 G/DL (ref 2.9–4.4)
ALBUMIN/GLOB SERPL: 1.2 {RATIO} (ref 0.7–1.7)
ALPHA1 GLOB SERPL ELPH-MCNC: 0.2 G/DL (ref 0–0.4)
ALPHA2 GLOB SERPL ELPH-MCNC: 0.6 G/DL (ref 0.4–1)
B-GLOBULIN SERPL ELPH-MCNC: 0.9 G/DL (ref 0.7–1.3)
GAMMA GLOB SERPL ELPH-MCNC: 1.7 G/DL (ref 0.4–1.8)
GLOBULIN SER-MCNC: 3.5 G/DL (ref 2.2–3.9)
IGA SERPL-MCNC: 99 MG/DL (ref 87–352)
IGG SERPL-MCNC: 1782 MG/DL (ref 586–1602)
IGM SERPL-MCNC: 33 MG/DL (ref 26–217)
INTERPRETATION SERPL IEP-IMP: ABNORMAL
KAPPA LC FREE SER-MCNC: 95.5 MG/L (ref 3.3–19.4)
KAPPA LC FREE/LAMBDA FREE SER: 12.24 {RATIO} (ref 0.26–1.65)
LABORATORY COMMENT REPORT: ABNORMAL
LAMBDA LC FREE SERPL-MCNC: 7.8 MG/L (ref 5.7–26.3)
M PROTEIN SERPL ELPH-MCNC: 1.1 G/DL
PROT SERPL-MCNC: 7.4 G/DL (ref 6–8.5)

## 2023-08-24 ENCOUNTER — LAB (OUTPATIENT)
Dept: LAB | Facility: HOSPITAL | Age: 58
End: 2023-08-24
Payer: COMMERCIAL

## 2023-08-24 DIAGNOSIS — C50.412 MALIGNANT NEOPLASM OF UPPER-OUTER QUADRANT OF BOTH BREASTS IN FEMALE, ESTROGEN RECEPTOR POSITIVE: ICD-10-CM

## 2023-08-24 DIAGNOSIS — C50.411 MALIGNANT NEOPLASM OF UPPER-OUTER QUADRANT OF BOTH BREASTS IN FEMALE, ESTROGEN RECEPTOR POSITIVE: ICD-10-CM

## 2023-08-24 DIAGNOSIS — Z17.0 MALIGNANT NEOPLASM OF UPPER-OUTER QUADRANT OF BOTH BREASTS IN FEMALE, ESTROGEN RECEPTOR POSITIVE: ICD-10-CM

## 2023-08-24 LAB
ALBUMIN SERPL-MCNC: 4.5 G/DL (ref 3.5–5.2)
ALBUMIN/GLOB SERPL: 1.4 G/DL
ALP SERPL-CCNC: 75 U/L (ref 39–117)
ALT SERPL W P-5'-P-CCNC: 21 U/L (ref 1–33)
ANION GAP SERPL CALCULATED.3IONS-SCNC: 11.9 MMOL/L (ref 5–15)
AST SERPL-CCNC: 30 U/L (ref 1–32)
B2 MICROGLOB SERPL-MCNC: 2.1 MG/L (ref 0.8–2.2)
BASOPHILS # BLD AUTO: 0.01 10*3/MM3 (ref 0–0.2)
BASOPHILS NFR BLD AUTO: 0.2 % (ref 0–1.5)
BILIRUB SERPL-MCNC: 0.3 MG/DL (ref 0–1.2)
BUN SERPL-MCNC: 25 MG/DL (ref 6–20)
BUN/CREAT SERPL: 25.5 (ref 7–25)
CALCIUM SPEC-SCNC: 9 MG/DL (ref 8.6–10.5)
CHLORIDE SERPL-SCNC: 102 MMOL/L (ref 98–107)
CO2 SERPL-SCNC: 22.1 MMOL/L (ref 22–29)
CREAT SERPL-MCNC: 0.98 MG/DL (ref 0.6–1.1)
DEPRECATED RDW RBC AUTO: 50 FL (ref 37–54)
EGFRCR SERPLBLD CKD-EPI 2021: 67 ML/MIN/1.73
EOSINOPHIL # BLD AUTO: 0.04 10*3/MM3 (ref 0–0.4)
EOSINOPHIL NFR BLD AUTO: 0.7 % (ref 0.3–6.2)
ERYTHROCYTE [DISTWIDTH] IN BLOOD BY AUTOMATED COUNT: 14.6 % (ref 12.3–15.4)
GLOBULIN UR ELPH-MCNC: 3.3 GM/DL
GLUCOSE SERPL-MCNC: 157 MG/DL (ref 65–99)
HCT VFR BLD AUTO: 33.5 % (ref 34–46.6)
HGB BLD-MCNC: 11.2 G/DL (ref 12–15.9)
IMM GRANULOCYTES # BLD AUTO: 0.02 10*3/MM3 (ref 0–0.05)
IMM GRANULOCYTES NFR BLD AUTO: 0.3 % (ref 0–0.5)
LYMPHOCYTES # BLD AUTO: 1.83 10*3/MM3 (ref 0.7–3.1)
LYMPHOCYTES NFR BLD AUTO: 30.7 % (ref 19.6–45.3)
MCH RBC QN AUTO: 30.8 PG (ref 26.6–33)
MCHC RBC AUTO-ENTMCNC: 33.4 G/DL (ref 31.5–35.7)
MCV RBC AUTO: 92 FL (ref 79–97)
MONOCYTES # BLD AUTO: 0.61 10*3/MM3 (ref 0.1–0.9)
MONOCYTES NFR BLD AUTO: 10.2 % (ref 5–12)
NEUTROPHILS NFR BLD AUTO: 3.46 10*3/MM3 (ref 1.7–7)
NEUTROPHILS NFR BLD AUTO: 57.9 % (ref 42.7–76)
NRBC BLD AUTO-RTO: 0 /100 WBC (ref 0–0.2)
PLATELET # BLD AUTO: 272 10*3/MM3 (ref 140–450)
PMV BLD AUTO: 8.8 FL (ref 6–12)
POTASSIUM SERPL-SCNC: 4 MMOL/L (ref 3.5–5.2)
PROT SERPL-MCNC: 7.8 G/DL (ref 6–8.5)
RBC # BLD AUTO: 3.64 10*6/MM3 (ref 3.77–5.28)
SODIUM SERPL-SCNC: 136 MMOL/L (ref 136–145)
WBC NRBC COR # BLD: 5.97 10*3/MM3 (ref 3.4–10.8)

## 2023-08-24 PROCEDURE — 80053 COMPREHEN METABOLIC PANEL: CPT

## 2023-08-24 PROCEDURE — 82232 ASSAY OF BETA-2 PROTEIN: CPT | Performed by: INTERNAL MEDICINE

## 2023-08-24 PROCEDURE — 36415 COLL VENOUS BLD VENIPUNCTURE: CPT

## 2023-08-24 PROCEDURE — 85025 COMPLETE CBC W/AUTO DIFF WBC: CPT

## 2023-08-25 LAB
ALBUMIN SERPL ELPH-MCNC: 3.9 G/DL (ref 2.9–4.4)
ALBUMIN/GLOB SERPL: 1.2 {RATIO} (ref 0.7–1.7)
ALPHA1 GLOB SERPL ELPH-MCNC: 0.2 G/DL (ref 0–0.4)
ALPHA2 GLOB SERPL ELPH-MCNC: 0.6 G/DL (ref 0.4–1)
B-GLOBULIN SERPL ELPH-MCNC: 0.9 G/DL (ref 0.7–1.3)
GAMMA GLOB SERPL ELPH-MCNC: 1.7 G/DL (ref 0.4–1.8)
GLOBULIN SER-MCNC: 3.4 G/DL (ref 2.2–3.9)
IGA SERPL-MCNC: 105 MG/DL (ref 87–352)
IGG SERPL-MCNC: 1888 MG/DL (ref 586–1602)
IGM SERPL-MCNC: 26 MG/DL (ref 26–217)
INTERPRETATION SERPL IEP-IMP: ABNORMAL
KAPPA LC FREE SER-MCNC: 92.5 MG/L (ref 3.3–19.4)
KAPPA LC FREE/LAMBDA FREE SER: 11.56 {RATIO} (ref 0.26–1.65)
LABORATORY COMMENT REPORT: ABNORMAL
LAMBDA LC FREE SERPL-MCNC: 8 MG/L (ref 5.7–26.3)
M PROTEIN SERPL ELPH-MCNC: 1.2 G/DL
PROT SERPL-MCNC: 7.3 G/DL (ref 6–8.5)

## 2023-08-31 ENCOUNTER — OFFICE VISIT (OUTPATIENT)
Dept: ONCOLOGY | Facility: CLINIC | Age: 58
End: 2023-08-31
Payer: COMMERCIAL

## 2023-08-31 VITALS
WEIGHT: 157.3 LBS | HEART RATE: 69 BPM | BODY MASS INDEX: 27.87 KG/M2 | OXYGEN SATURATION: 98 % | RESPIRATION RATE: 16 BRPM | SYSTOLIC BLOOD PRESSURE: 133 MMHG | TEMPERATURE: 97.8 F | DIASTOLIC BLOOD PRESSURE: 83 MMHG | HEIGHT: 63 IN

## 2023-08-31 DIAGNOSIS — D47.2 MGUS (MONOCLONAL GAMMOPATHY OF UNKNOWN SIGNIFICANCE): Primary | ICD-10-CM

## 2023-08-31 RX ORDER — FLUTICASONE FUROATE, UMECLIDINIUM BROMIDE AND VILANTEROL TRIFENATATE 200; 62.5; 25 UG/1; UG/1; UG/1
1 POWDER RESPIRATORY (INHALATION) DAILY
COMMUNITY

## 2023-08-31 RX ORDER — ALBUTEROL SULFATE 2.5 MG/3ML
2.5 SOLUTION RESPIRATORY (INHALATION) AS NEEDED
COMMUNITY

## 2023-08-31 NOTE — PROGRESS NOTES
Subjective      REASONS FOR FOLLOWUP:   T2N1M0, ER/AL negative, HER2 positive breast cancer.                   2.. MGUS Recurrent syncope workup found incidental IgG kappa                              monoclonal  gammopathy  Workup negative negative         History of Present Illness patient is a 56-year-old lady with a node positive HER-2 positive ER negative breast cancer 10 years from completion of adjuvant chemotherapy.  And MGUS 5 years from diagnosis     She comes in today for follow-up and overall she is doing well but   She also has an incidentally found IgG MGUS which we are following and appears to gradually increasing both her M protein and free light chain ratio .her M protein is 1.3 g and we will check a little more closely at 3-month intervals for now  She had a hip replaced and is doing well from that    She is having some issues with lymphedema in her left hand and also some pain in her left neck with radicular type symptoms into her left arm and I have asked for a C-spine MRI to evaluate this but this is almost certainly benign and degenerative-C-spine MRI does show significant degenerative disease and physical therapy has helped her left shoulder discomfort  Her CBC stable except for mild anemia and she has had this in the past and we will keep an eye on it   She has had some episodes of syncope which go back to 10 years ago more recently she has had 3 episodes in 3 months and is very concerned.  The cardiologist ruled out cardiac issues which has never had a tilt table test.  Neurologist have seen her but have no suggestions.  She had an MRI of the brain and 2014 and then again in 2018 which was negative    Active Ambulatory Problems     Diagnosis Date Noted    Bilateral malignant neoplasm of upper outer quadrant of breast in female 04/15/2016    Chronic pain of left knee 01/11/2018    Acute pain of left knee 01/11/2018    Contusion of left knee 01/11/2018    Arthritis of left knee 02/09/2018     Chondromalacia, patella, left 2018    MGUS (monoclonal gammopathy of unknown significance) 2018    Arthritis of left hip 2019    Left hip pain 2019    Status post administration of cardiotoxic chemotherapy 2021    Precordial pain 2021     Resolved Ambulatory Problems     Diagnosis Date Noted    No Resolved Ambulatory Problems     Past Medical History:   Diagnosis Date    Acquired hypothyroidism     Anemia     Arthritis     Asthma     Cancer     Chest pain     H/O Chondromalacia patellae, left     H/O Regurgitation, mitral/triscupid, mild     H/O Syncope w/collapse     Heart murmur     History of chronic fatigue     Hx of bladder infections     Lymphedema     Migraines     MVP (mitral valve prolapse)     Pneumonia      SURGICAL HISTORY: Fibroadenoma from the left breast in . Uterine fibroids removed laparoscopically in . Left knee arthroscopy in  and hysterectomy in . Ovaries were left in place.     GYN HISTORY: Menarche age 13.  0. She obviously stopped menstruating at the time of her hysterectomy in  and is having hot flashes.     HEMATOLOGIC/ONCOLOGIC HISTORY:    The  patient initially seen on 10/18/2013, a 48-year-old  female who works as a  at the [employer] office who has a long history of fibrocystic breast disease with multiple ultrasounds and mammograms in the past to evaluate c ysts but after her most recent mammogram, because of some abnormalities, an ultrasound was recommended on 2013 at [facility]. The targeted ultrasound showed 3 circumscribed solid masses in the left breast at the 2 o' clock position, axillar y tail of the breast. The appearance was suggestive of intramammary lymph nodes without a definite fatty hilum which was worrisome for tumor involvement. One of the 3 masses had actually increased in size compared to 2 of the other lesions which were un changed from   suggesting benign etiology. Because of the greater than 20% interval increase in size of one of these nodules since the last mammogram, ultrasound guided biopsy was recommended.   Ultrasound-guided biopsy was done on 08/01/2013 and this biopsy showed findings highly suspicious for lymph node involvement by ductal carcinoma and the patient then went to see Dr. Witt who sent her for another biopsy on 08/19/2013 and a 1.5 cm mass was seen at the 3 o' clock position of the left breast in luan tion to the 3 abnormal axillary masses which were felt to be lymph nodes. This area was biopsied on 08/19/2013 and the path report showed invasive ductal carcinoma and DCIS grade 3 with the largest focus of invasive cancer being 1 cm and DCIS was interme d iate to high grade. The left axillary lymph node was core biopsied and confirmed metastatic carcinoma. ER/UT were negative, HER2 was 3+ positive. This led to an MRI of the breast 08/27/2013 which showed 3 cm mass in the lower outer quadrant of the left breast and additional clump enhancement in the middle and anterior third of the left breast at the 3 o' clock axis suspicious for in situ carcinoma and 3 rounded masses measured up to 9 mm in greatest dimension are noted consistent with lymph nodes. One ha d a clip from the recent biopsy. Right breast showed stippled foci of variable enhancement with no dominant or clumped findings, prominent right axillary lymph nodes were noted and right axillary lymph node biopsy was done. This was nondiagnostic.   The patient underwent bone scan on 09/09/2013 which was unremarkable except for some increased activity at T9 and the right part of L3 which was again felt to be nonspecific but plain films were recommended. CT scan of the chest, abdomen and pelvis was done which was also unremarkable except for prominent intramammary nodes in the upper outer left breast and several prominent axillary lymph nodes bilaterally one of which has been  biopsied on the right. Appearance raised the concern of possible metastatic to both axillae but there were no other sites of involvement. Bone density dated 09/20/2013 was normal. Patient then went for surgery on 09/25/2013 at which time she underwent right mastectomy which was unremarkable and left total mastectomy with axillary dissection which showed a 2.9 cm grade 3 infiltrating ductal carcinoma with associated DCIS, margins were uninvolved. Three of 21 lymph nodes showed metastatic disease making this a pT2N1.   The patient has done well from surgery but has had a lot of prob lems with anxiety and hot flashes but otherwise has done well. She went to see Dr. Cedeno for recommendations and then came to see us for further discussion of treatment options. In addition, the patient had BRCA testing which thankfully was negative.   T he patient had a MUGA scan, which showed an ejection fraction of 50% and no other abnormalities. Her plain films of the abnormal spots on her bone scan were negative. She had port placed and is ready to start treatment. The patient was presented at the [facility] board and the right axillary lymph nodes were felt to be insignificant and plans for dose-dense AC/Taxol followed by Herceptin for a year and radiation to be considered because of the carroll disease.   When she had CT scans of the chest, abdomen and pelvis, they were unremarkable except for hemangioma in the liver and prominent intramammary nodes in the upper outer quadrant of the left breast with several prominent axillary nodes bilaterally.   The patient started dose-dense AC with fairly good tolerance except for some mucositis after cycle 4. She had a febrile illness that required hospitalization from 01/02/2014 - 01/15/2014 with a fever of unknown origin. Multiple viral cultures were negative including CMV, EBV, herpes, respiratory panel and hepatiti s profile was negative. Mouth multiple blood and urine cultures and throat  cultures were negative except for Haemophilus parainfluenza for which a course of amoxicillin was given. During the hospitalization the patient spiked fevers continuously and then developed rapid elevation of her liver tests with a ferritin which went up as high as 44,000. The patient defervesced finally and the liver profile started coming down. Echo with bubble echo during her stay was normal with ejection fraction of 60%. The jose francisco shah was discharged home with plans to resume chemotherapy cautiously once her numbers stabilized.   The patient was seen on 01/22/2014 feeling much better. She still is not very active, feeling fatigued and deconditioned from lying in bed for almost 2 we eks. The decision was made because of borderline blood pressure of 94/70 and some mild tachycardia to have her be more active and exercise for another week and reevaluate to start Herceptin on 01/30/2014 with Taxol to be added after 2 doses of Herceptin i f she is tolerating the treatment well and her liver functions are normal. A bone marrow biopsy was also done during her hospital stay and no signs of hemophagocytosis or dysplasia although there was some aberrant myeloid antigen of uncertain significanc e which has been seen with myelodysplasia. She was also seen briefly by Dr. Fatmata Tolbert of Rheumatology while in the hospital for low positive ARTEMIO of 1:160 and anti-SSA antibody greater than 8 but Dr. Tolbert felt this was unlikely to be rheumatologica l disease because 4 doses of Cytoxan should not have precipitated this.   The patient initiated weekly Herceptin for 2 weeks with no significant problems. Taxol weekly initiated on 02/13/2014 with close followup of LFTs and cardiac function.   The patient was seen on 03/06/2014 with echo showing EF of 63%. We will continue Taxol and Herceptin weekly and watch her LFTs closely. Neurontin causes dizziness and she is not taking it. We will hold off on Effexor for the time being.    Patient required Neupogen for 3 doses because of an ANC of 1.06 with dose 4 and Neupogen x3 is added for each dose.   The patient is seen 03/20/2014 doing fairly well. Taxol dose decreased to 15% because of neuropathy. Neupogen continued.   The patient was seen on 05/23/2014 doing well. Herceptin continued with plans to check an echocardiogram before her next treatment in 3 weeks.   MRI of the liver confirmed hemangioma. Echocardiogram is stable at 59%. Herceptin continued at 3-week intervals with the addition of Pepcid and Solu-Cortef 50 mg and the patient is almost certainly going to agree to radiation for her 3 node status, especially since the pathologist at [facility] thought there was extranodal extension.   The patient was seen 07/25/2014. Echocardiogram results noted, slightly reduced from previous, the patient proceeding through radiation therapy of left chest wall. Followup echocardiogram scheduled after her Herceptin therapy 07/25/2014.   Patient was seen on 08/14/2014 with EF of 53%. I discussed th e case with Dr. Bebe Fernandez and did not use the definitive contrast and this may explain the differences, but based on the fact that she had some near syncope on one occasion this weekend and the fact that she has had radiation to the left chest area with a significant skin burn, we will hold Herceptin today and bring her back in 4 weeks, but check her LFTs and ferritin again today.   The patient was 11/14/2014 doing well with ejection fraction of 63% and echocardiogram was reviewed. Herceptin will be continued until mid-January.   The patient is seen 12/29/2014. She had a syncopal episode 12/19/2014. ER workup was negative and neurological evaluation was ordered. Bone scan ordered for right hip pain. If this is negative she will have her port removed in J anuary and she is planning to delay reconstruction for a year.   The patient was evaluated for some pain in her back with a bone scan which was  essentially unremarkable except for some degenerative changes at L3, and T6-7-8. Because we were concerned, we ordered an MRI of the thoracic and lumbar spine which was benign with just DJD. No further followup is needed at this time with regard to scans.   Patient seen on 05/08/2015, doing well; occasional migraine-type headache persists. Port has been removed. She is thinking about reconstruction sometime at the end of the year.   CT of the abdomen done at [facility] in 10/2014 shows a liver lesion, which was indeterminate. They recommended MRI. We have compared to a previous MRI in 2014 and established this is hemangioma.   Patient seen on 12/18/2015 having had CT scans of the chest and abdo men at [facility] for some abdominal discomfort that showed a 3 cm lesion in the dome of the liver, which they were worried about and recommended an MRI, but we reviewed the scans and also compared to the previous MRI of the liver d one in 2014 in October and confirmed that this indeed was a hemangioma that has been stable since 2013. An echo was also done which showed an ejection fraction of 60% and stable.   7/17   patient is a 51-year-old  female with an ER/NH negative HER-2 positive breast cancer node positive T2 N1 M0 treated with Adriamycin and Cytoxan followed by Taxol Herceptin was completed in late 2014 .  He is here for routine follow-up and overall she is doing well she was diagnosed with asthma and  is on an inhaler with good improvement.  She has mild hot flashes but has mood swings and menopausal symptoms as reported into menopause with chemotherapy.  She has no unusual aches or pains but otherwise is doing well and working full-time.  She is not interested in reconstruction at this point.  We did discuss doing extended genetic testing because of her family history we referred her for the extended testing but she has not done that yet She did have a  screening colonoscopy and this was negative.  DEXA scan was normal in April of this year.  She is still very anxious about recurrence which is understandable but getting better with time.    0.9 g IgG kappa MGUS noted the negative skeletal survey and urine protein.  Bone marrow planned workup for recurrent syncope underway per cardiology    10/18  having intermittent episodes of syncope which sounds vasovagal the last one was in May when she was at a bar drinking with friends .    she's had an echo and a stress test and an EEG andbrain MRI  which was reportedly benign.  She is currently wearing a event monitor During the workup of these syncopal episodes the neurologist ordered an immunoelectrophoresis which showed an IgG paraprotein of 0.8 g/dL.  Need to evaluate this further but I don't think this is anything to do with her syncope unless she has an autonomic neuropathy but she was sitting down at the time she felt nausea  and had her syncopal episode that does not appear to be orthostatic  We reviewed her skeletal survey which is essentially negative her urine did not show any significant Bence Witt protein she has a 0.9 g/dL M protein with a mildly elevated light chain ratio and a mildly elevated beta-2 microglobulin of 2.5      I recommended a bone marrow biopsy and staining for amyloid and this was done and her bone marrow showed 5% plasma cells which are monotypic and have a 13q minus but no amyloid deposition-I explained that she has an MGUS but can proceed to myeloma over time especially because she has an abnormal free light chain ratio and we will watch her M protein closely  I'm not sure this explains her syncope        SOCIAL HISTORY: Single. . She does not smoke. Drinks very little. No drug history.     FAMILY HISTORY: Both of her parents are in their 60s. Mother had kidney cancer and had a partial kidney resection. Brother  of head trauma, another is healthy. She has two  "sisters who are healthy. She has 2 paternal aunts with breast cancer; one is alive in her 70s, the other  at 48 of breast cancer. A paternal uncle with kidney cancer. Paternal grandfather with colon cancer in his 60s. Multiple paternal cousins with cancer inc luding leukemia and kidney cancer. No other history of breast or ovarian cancer in the family.     Review of Systems   Constitutional:  Positive for fatigue.   Respiratory:  Negative for chest tightness.    Gastrointestinal:  Negative for constipation, diarrhea, nausea and vomiting.   Musculoskeletal:  Positive for back pain (same 2020) and neck pain. Negative for arthralgias and gait problem.   Skin:  Negative for rash.   Allergic/Immunologic: Negative for environmental allergies.   Neurological:  Negative for numbness (Chronic from chemo).   Psychiatric/Behavioral:  Nervous/anxious: little better 2020.     A comprehensive 14 point review of systems was performed and was negative except as mentioned.    Medications:  The current medication list was reviewed in the EMR    ALLERGIES:    Allergies   Allergen Reactions    Ciprofloxacin Hives    Sulfa Antibiotics Hives    Penicillins Nausea And Vomiting    Amoxicillin Nausea And Vomiting       Objective      Vitals:    23 0931   BP: 133/83   Pulse: 69   Resp: 16   Temp: 97.8 øF (36.6 øC)   TempSrc: Temporal   SpO2: 98%   Weight: 71.4 kg (157 lb 4.8 oz)   Height: 160 cm (62.99\")   PainSc:   3   PainLoc: Shoulder  Comment: left side         2023     9:26 AM   Current Status   ECOG score 0       Physical Exam    GENERAL:  Well-developed, well-nourished in no acute distress.   SKIN:  Warm, dry without rashes, purpura or petechiae.   HEAD:  Normocephalic.  EYES:  Pupils equal, round and reactive to light.  EOMs intact.  Conjunctivae normal.  EARS:  Hearing intact.  NOSE:  Septum midline.  No excoriations or nasal discharge.  MOUTH:  Tongue is well-papillated; no stomatitis or ulcers.  Lips " normal.  THROAT:  Oropharynx without lesions or exudates.  NECK:  Supple with good range of motion; no thyromegaly or masses, no JVD.  LYMPHATICS:  No cervical, supraclavicular, axillary or inguinal adenopathy.  CHEST:  Lungs clear to percussion and auscultation. Good airflow.  BREASTS: Bilateral chest wall is benign with no axillary adenopathy-  CARDIAC:  Regular rate and rhythm without murmurs, rubs or gallops. Normal S1,S2.  ABDOMEN:  Soft, nontender with no organomegaly or masses.  EXTREMITIES:  No clubbing, cyanosis or edema.  Mild lymphedema left forearm  NEUROLOGICAL:  Cranial Nerves II-XII grossly intact.  No focal neurological deficits.  PSYCHIATRIC:  Normal affect and mood.    I have reexamined the patient and the results are consistent with the previously documented exam. Ashely Yusuf, PCT       RECENT LABS:  Lab Results   Component Value Date    WBC 5.97 08/24/2023    HGB 11.2 (L) 08/24/2023    HCT 33.5 (L) 08/24/2023    MCV 92.0 08/24/2023     08/24/2023     Lab Results   Component Value Date    GLUCOSE 157 (H) 08/24/2023    BUN 25 (H) 08/24/2023    CREATININE 0.98 08/24/2023    EGFR 67.0 08/24/2023    BCR 25.5 (H) 08/24/2023    K 4.0 08/24/2023    CO2 22.1 08/24/2023    CALCIUM 9.0 08/24/2023    PROTENTOTREF 7.3 08/24/2023    ALBUMIN 4.5 08/24/2023    ALBUMIN 3.9 08/24/2023    LABIL2 1.2 08/24/2023    AST 30 08/24/2023    ALT 21 08/24/2023         Assessment & Plan   1.  T2 N1 ER/MI negative HER-2 positive left breast cancer 2013 treated with Adriamycin Cytoxan Taxol Herceptin and bilateral mastectomies plus radiation 9 Years from diagnosis  2.  BRCA negative VUS ABRAXAS1  3.  Hemangioma of the liver  4.  family history of malignancy  5.  Mild chronic anemia iron normal -scope 8/19 neg  6.  Anxiety intolerant of Effexor ?  Zyprexa 2.5 mg   7. Recurrent syncope without obvious precipitating cause workup underway  8.  Incidental finding of a monoclonal gammopathy IgG kappa skeletal survey  negative bone marrow 8/18  showing 5% plasma cells with no amyloid  M protein 1.1 g in 8/22 with abnormal light chain ratio which is stable  M protein up to 1.3 in 2/23 with increasing light chain ratio abnormality closer follow-up planned  Protein stable 1.2 and 8/23 5 years from diagnosis  9.  Left neck pain and radicular symptoms?  Degenerative disease          C-spine DJD refer to neurosurgery    Plan  1.return for myeloma parameters in 6/12 months  2.  see me in 12 months   3.  Tilt table test for syncope  4.  Follow-up with neurology          8/31/2023      CC:

## 2023-09-07 ENCOUNTER — HOSPITAL ENCOUNTER (OUTPATIENT)
Dept: CARDIOLOGY | Facility: HOSPITAL | Age: 58
Discharge: HOME | End: 2023-09-07
Payer: COMMERCIAL

## 2023-09-07 VITALS
DIASTOLIC BLOOD PRESSURE: 99 MMHG | HEART RATE: 57 BPM | HEIGHT: 63 IN | TEMPERATURE: 97.3 F | RESPIRATION RATE: 16 BRPM | WEIGHT: 155 LBS | OXYGEN SATURATION: 100 % | SYSTOLIC BLOOD PRESSURE: 148 MMHG | BODY MASS INDEX: 27.46 KG/M2

## 2023-09-07 DIAGNOSIS — D47.2 MGUS (MONOCLONAL GAMMOPATHY OF UNKNOWN SIGNIFICANCE): ICD-10-CM

## 2023-09-07 PROCEDURE — 93660 TILT TABLE EVALUATION: CPT

## 2023-09-07 RX ORDER — SODIUM CHLORIDE 0.9 % (FLUSH) 0.9 %
10 SYRINGE (ML) INJECTION AS NEEDED
Status: DISCONTINUED | OUTPATIENT
Start: 2023-09-07 | End: 2023-09-08 | Stop reason: HOSPADM

## 2023-09-07 RX ORDER — SODIUM CHLORIDE 9 MG/ML
75 INJECTION, SOLUTION INTRAVENOUS CONTINUOUS
Status: DISCONTINUED | OUTPATIENT
Start: 2023-09-07 | End: 2023-09-08 | Stop reason: HOSPADM

## 2023-09-07 RX ORDER — SODIUM CHLORIDE 9 MG/ML
40 INJECTION, SOLUTION INTRAVENOUS AS NEEDED
Status: DISCONTINUED | OUTPATIENT
Start: 2023-09-07 | End: 2023-09-08 | Stop reason: HOSPADM

## 2023-09-07 RX ORDER — SODIUM CHLORIDE 0.9 % (FLUSH) 0.9 %
10 SYRINGE (ML) INJECTION EVERY 12 HOURS SCHEDULED
Status: DISCONTINUED | OUTPATIENT
Start: 2023-09-07 | End: 2023-09-08 | Stop reason: HOSPADM

## 2023-09-07 RX ADMIN — SODIUM CHLORIDE 75 ML/HR: 9 INJECTION, SOLUTION INTRAVENOUS at 09:21

## 2023-10-18 NOTE — PROGRESS NOTES
Date of Office Visit: 10/19/2023  Encounter Provider: Bebe Fernandez MD  Place of Service: [facility] CARDIOLOGY  Patient Name: Santos Witt  :1965    Chief complaint  Mitral valve prolapse, cardio oncology care    History of Present Illness  Patient is a 58-year-old female with history of mitral valve prolapse, hypothyroidism, asthma, anemia and breast cancer followed by Dr. Stinson.  She was diagnosed with left-sided breast cancer in  treated with Adriamycin/Cytoxan/Taxol and Herceptin and left-sided radiation.  She had bilateral mastectomy and radiation therapy.  During chemotherapy ejection fraction fell from 63% to 53%.  Herceptin was held and subsequently resumed when ejection fraction improved to 64%.  Stress echocardiogram 2018 with an ejection fraction was 62% with no ischemia and no evidence of mitral prolapse.  She  was subsequeshently found to have MGUS which is felt to be stable.  In 2021 she was seen for dyspnea on exertion stress echocardiogram showed normal systolic function ejection fraction 65% with GLS at -20.8% with mild left ventricular hypertrophy, diastolic function was indeterminate.  She had multiple small jets of mitral regurgitation felt to be overall mild without evidence of prolapse.  With trivial tricuspid regurgitation normal right-sided pressures.  While patient had chest pain on the treadmill EKG and echo images were normal at 8 METS.  On 2023 patient had a nuclear stress test that was negative for ischemia at 9.4 METS.  Significant gastric uptake was noted.    I was running late and patient was not able to stay for visit.    Past Medical History:   Diagnosis Date    Acquired hypothyroidism     Anemia     History of anemia    Arthritis     Knees    Asthma     Cancer     Left breast cancer, T2NM0    Chest pain     H/O Chondromalacia patellae, left     H/O Regurgitation, mitral/triscupid, mild     H/O Syncope w/collapse     Heart murmur      Childhood    History of chronic fatigue     Hx of bladder infections     Lymphedema     Migraines     MVP (mitral valve prolapse)     Pneumonia      Past Surgical History:   Procedure Laterality Date    BREAST BIOPSY Left 08/2013    x2     BREAST CYST EXCISION Left 1982    Fibroid cyst left nipple    BREAST SURGERY Bilateral 09/2013    Mastectomy    HYSTERECTOMY  06/2011    Partial    KNEE ARTHROSCOPY Left 2006    PELVIC LAPAROSCOPY  2002    cysts removed    TOTAL HIP ARTHROPLASTY Left 2019     Outpatient Medications Prior to Visit   Medication Sig Dispense Refill    Acetaminophen 500 MG capsule Take  by mouth.      albuterol (PROVENTIL) (2.5 MG/3ML) 0.083% nebulizer solution Take 2.5 mg by nebulization As Needed.      albuterol sulfate  (90 Base) MCG/ACT inhaler Inhale 2 puffs Every 4 (Four) Hours As Needed for Wheezing.      ascorbic acid (VITAMIN C) 500 MG tablet Take 2 tablets by mouth Daily.      B Complex Vitamins (VITAMIN B COMPLEX) capsule capsule Take  by mouth Daily.      Cholecalciferol (VITAMIN D3) 5000 UNITS capsule capsule Take 1 capsule by mouth 1 (One) Time Per Week.      EPINEPHrine (EPIPEN) 0.3 MG/0.3ML solution auto-injector injection INJECT 0.3 MILLILITER (0.3 MG) BY INTRAMUSCULAR ROUTE ONCE AS NEEDED FOR ANAPHYLAXIS  0    levothyroxine (SYNTHROID, LEVOTHROID) 75 MCG tablet Take 1 tablet by mouth.      LORATADINE PO Take  by mouth.      magnesium oxide (MAGOX) 400 (241.3 Mg) MG tablet tablet Take 1 tablet by mouth Daily.      mometasone (NASONEX) 50 MCG/ACT nasal spray mometasone 50 mcg/actuation nasal spray   SPRAY 2 SPRAYS IN EACH NOSTRIL BY INTRANASAL ROUTE ONCE DAILY      Multiple Vitamin (MULTI-VITAMINS PO) Take  by mouth daily.      Omega-3 Fatty Acids (OMEGA 3 500 PO) Take  by mouth.      PATIENT SUPPLIED ALLERGY INJECTION Inject  under the skin into the appropriate area as directed. Biweekly      sertraline (ZOLOFT) 50 MG tablet Take 1 tablet by mouth Daily.      Trelegy  "Ellipta 200-62.5-25 MCG/ACT aerosol powder  Inhale 1 puff Daily.       No facility-administered medications prior to visit.       Allergies as of 10/19/2023 - Reviewed 10/19/2023   Allergen Reaction Noted    Ciprofloxacin Hives 03/15/2016    Sulfa antibiotics Hives 03/15/2016    Penicillins Nausea And Vomiting 03/18/2022    Amoxicillin Nausea And Vomiting 03/15/2016     Social History     Socioeconomic History    Marital status: Single    Number of children: 0    Years of education: High school   Tobacco Use    Smoking status: Never    Smokeless tobacco: Never   Vaping Use    Vaping Use: Never used   Substance and Sexual Activity    Alcohol use: Yes     Comment: Very little    Drug use: No    Sexual activity: Defer     Family History   Problem Relation Age of Onset    Kidney cancer Mother         Treated surgically    Heart disease Mother     Hypertension Mother     Thyroid disease Mother     Lung cancer Mother     Heart attack Mother     Hypertension Father     Heart disease Father     Heart attack Father     Thyroid disease Sister     No Known Problems Brother     Breast cancer Paternal Aunt     Heart disease Paternal Aunt     Kidney cancer Paternal Uncle     Colon cancer Paternal Grandfather         In his 60s    Breast cancer Paternal Aunt     Diabetes Maternal Aunt     Pancreatic cancer Other     Heart disease Maternal Grandmother     Heart attack Maternal Grandmother     Heart disease Paternal Grandmother     Heart attack Paternal Grandmother      ROS     Objective:     Vitals:    10/19/23 0800   BP: 134/82   Pulse: 55   Weight: 69.9 kg (154 lb)   Height: 160 cm (63\")     Body mass index is 27.28 kg/m².    Physical Exam  Lab Review:   Lab Results - Last 18 Months   Lab Units 08/24/23  1621 07/27/23  1226 05/18/23  1625   WBC 10*3/mm3 5.97 5.76 5.65   RBC 10*6/mm3 3.64* 3.78* 3.82   HEMOGLOBIN g/dL 11.2* 11.3* 11.5*   HEMATOCRIT % 33.5* 34.3* 35.3   MCV fL 92.0 90.7 92.4   MCH pg 30.8 29.9 30.1   MCHC g/dL 33.4 " 32.9 32.6   RDW % 14.6 15.0 14.8   PLATELETS 10*3/mm3 272 248 249   NEUTROPHIL % % 57.9 61.9 54.1   LYMPHOCYTE % % 30.7 27.3 37.5   MONOCYTES % % 10.2 9.2 7.4   EOSINOPHIL % % 0.7 1.0 0.4   BASOPHIL % % 0.2 0.3 0.4   NEUTROS ABS 10*3/mm3 3.46 3.56 3.06   LYMPHS ABS 10*3/mm3 1.83 1.57 2.12   MONOS ABS 10*3/mm3 0.61 0.53 0.42   EOS ABS 10*3/mm3 0.04 0.06 0.02   BASOS ABS 10*3/mm3 0.01 0.02 0.02   RDW-SD fl 50.0  --  50.7   MPV fL 8.8 10.7 9.2     Lab Results - Last 18 Months   Lab Units 08/24/23  1621 04/18/23  1706   GLUCOSE mg/dL 157* 94   BUN mg/dL 25* 16   CREATININE mg/dL 0.98 1.01*   SODIUM mmol/L 136 141   POTASSIUM mmol/L 4.0 3.6   CHLORIDE mmol/L 102 105   CO2 mmol/L 22.1 24.0   CALCIUM mg/dL 9.0 8.6   BUN / CREAT RATIO  25.5* 15.8   ANION GAP mmol/L 11.9 12.0   EGFR mL/min/1.73 67.0 64.7     Lab Results - Last 18 Months   Lab Units 08/24/23  1621 05/18/23  1625 04/18/23  1706   GLUCOSE mg/dL 157*  --  94   BUN mg/dL 25*  --  16   CREATININE mg/dL 0.98  --  1.01*   SODIUM mmol/L 136  --  141   POTASSIUM mmol/L 4.0  --  3.6   CHLORIDE mmol/L 102  --  105   CO2 mmol/L 22.1  --  24.0   CALCIUM mg/dL 9.0  --  8.6   TOTAL PROTEIN g/dL 7.8  --  7.4   ALBUMIN g/dL 4.5  3.9   < > 4.1   ALT (SGPT) U/L 21  --  18   AST (SGOT) U/L 30  --  24   ALK PHOS U/L 75  --  64   BILIRUBIN mg/dL 0.3  --  0.3   GLOBULIN gm/dL 3.3  --  3.3   A/G RATIO g/dL 1.4  --  1.2   BUN / CREAT RATIO  25.5*  --  15.8   ANION GAP mmol/L 11.9  --  12.0   EGFR mL/min/1.73 67.0  --  64.7    < > = values in this interval not displayed.       Lab Results - Last 18 Months   Lab Units 04/18/23  1706   HSTROP T ng/L <6         ECG 12 Lead    Date/Time: 10/19/2023 8:50 AM  Performed by: Bebe Fernandez MD    Authorized by: Bebe Fernandez MD         No diagnosis found.  Plan:       I was running late and patient was not able to stand for visit.              Your medication list            Accurate as of October 19, 2023 11:59 PM. If you have any questions,  ask your nurse or doctor.                CONTINUE taking these medications        Instructions Last Dose Given Next Dose Due   Acetaminophen 500 MG capsule      Take  by mouth.       albuterol sulfate  (90 Base) MCG/ACT inhaler  Commonly known as: PROVENTIL HFA;VENTOLIN HFA;PROAIR HFA      Inhale 2 puffs Every 4 (Four) Hours As Needed for Wheezing.       albuterol (2.5 MG/3ML) 0.083% nebulizer solution  Commonly known as: PROVENTIL      Take 2.5 mg by nebulization As Needed.       ascorbic acid 500 MG tablet  Commonly known as: VITAMIN C      Take 2 tablets by mouth Daily.       EPINEPHrine 0.3 MG/0.3ML solution auto-injector injection  Commonly known as: EPIPEN      INJECT 0.3 MILLILITER (0.3 MG) BY INTRAMUSCULAR ROUTE ONCE AS NEEDED FOR ANAPHYLAXIS       levothyroxine 75 MCG tablet  Commonly known as: SYNTHROID, LEVOTHROID      Take 1 tablet by mouth.       LORATADINE PO      Take  by mouth.       magnesium oxide 400 (241.3 Mg) MG tablet tablet  Commonly known as: MAGOX      Take 1 tablet by mouth Daily.       mometasone 50 MCG/ACT nasal spray  Commonly known as: NASONEX      mometasone 50 mcg/actuation nasal spray   SPRAY 2 SPRAYS IN EACH NOSTRIL BY INTRANASAL ROUTE ONCE DAILY       multivitamin tablet tablet  Commonly known as: THERAGRAN      Take  by mouth daily.       OMEGA 3 500 PO      Take  by mouth.       PATIENT SUPPLIED ALLERGY INJECTION      Inject  under the skin into the appropriate area as directed. Biweekly       sertraline 50 MG tablet  Commonly known as: ZOLOFT      Take 1 tablet by mouth Daily.       Trelegy Ellipta 200-62.5-25 MCG/ACT aerosol powder   Generic drug: Fluticasone-Umeclidin-Vilant      Inhale 1 puff Daily.       vitamin b complex capsule capsule      Take  by mouth Daily.       vitamin D3 125 MCG (5000 UT) capsule capsule      Take 1 capsule by mouth 1 (One) Time Per Week.                Patient is no longer taking -.  I corrected the med list to reflect this.  I did not stop  these medications.      Dictated utilizing Dragon dictation

## 2023-10-19 ENCOUNTER — HOSPITAL ENCOUNTER (OUTPATIENT)
Dept: CARDIOLOGY | Facility: HOSPITAL | Age: 58
Discharge: HOME | End: 2023-10-19
Admitting: NURSE PRACTITIONER
Payer: COMMERCIAL

## 2023-10-19 ENCOUNTER — OFFICE VISIT (OUTPATIENT)
Dept: CARDIOLOGY | Facility: CLINIC | Age: 58
End: 2023-10-19
Payer: COMMERCIAL

## 2023-10-19 VITALS
HEIGHT: 63 IN | BODY MASS INDEX: 27.29 KG/M2 | DIASTOLIC BLOOD PRESSURE: 82 MMHG | WEIGHT: 154 LBS | HEART RATE: 55 BPM | SYSTOLIC BLOOD PRESSURE: 134 MMHG

## 2023-10-19 VITALS
HEART RATE: 58 BPM | SYSTOLIC BLOOD PRESSURE: 120 MMHG | WEIGHT: 155 LBS | BODY MASS INDEX: 27.46 KG/M2 | OXYGEN SATURATION: 96 % | DIASTOLIC BLOOD PRESSURE: 82 MMHG

## 2023-10-19 DIAGNOSIS — Z92.21 STATUS POST ADMINISTRATION OF CARDIOTOXIC CHEMOTHERAPY: Primary | ICD-10-CM

## 2023-10-19 DIAGNOSIS — Z92.21 STATUS POST ADMINISTRATION OF CARDIOTOXIC CHEMOTHERAPY: ICD-10-CM

## 2023-10-19 LAB
AORTIC ARCH: 2 CM
ASCENDING AORTA: 2.9 CM
BH CV ECHO LEFT VENTRICLE GLOBAL LONGITUDINAL STRAIN: -21.5 %
BH CV ECHO MEAS - ACS: 1.79 CM
BH CV ECHO MEAS - AO MAX PG: 11.4 MMHG
BH CV ECHO MEAS - AO MEAN PG: 6 MMHG
BH CV ECHO MEAS - AO ROOT DIAM: 2.7 CM
BH CV ECHO MEAS - AO V2 MAX: 169 CM/SEC
BH CV ECHO MEAS - AO V2 VTI: 37.4 CM
BH CV ECHO MEAS - AVA(I,D): 1.23 CM2
BH CV ECHO MEAS - EDV(CUBED): 117.6 ML
BH CV ECHO MEAS - EDV(MOD-SP2): 90 ML
BH CV ECHO MEAS - EDV(MOD-SP4): 99 ML
BH CV ECHO MEAS - EF(MOD-BP): 65.9 %
BH CV ECHO MEAS - EF(MOD-SP2): 66.7 %
BH CV ECHO MEAS - EF(MOD-SP4): 64.6 %
BH CV ECHO MEAS - EF_3D-VOL: 66 %
BH CV ECHO MEAS - ESV(CUBED): 22.1 ML
BH CV ECHO MEAS - ESV(MOD-SP2): 30 ML
BH CV ECHO MEAS - ESV(MOD-SP4): 35 ML
BH CV ECHO MEAS - FS: 42.8 %
BH CV ECHO MEAS - IVS/LVPW: 0.91 CM
BH CV ECHO MEAS - IVSD: 1 CM
BH CV ECHO MEAS - LAT PEAK E' VEL: 8.7 CM/SEC
BH CV ECHO MEAS - LV DIASTOLIC VOL/BSA (35-75): 57.1 CM2
BH CV ECHO MEAS - LV MASS(C)D: 188.1 GRAMS
BH CV ECHO MEAS - LV MAX PG: 3.3 MMHG
BH CV ECHO MEAS - LV MEAN PG: 2 MMHG
BH CV ECHO MEAS - LV SYSTOLIC VOL/BSA (12-30): 20.2 CM2
BH CV ECHO MEAS - LV V1 MAX: 90.4 CM/SEC
BH CV ECHO MEAS - LV V1 VTI: 19.8 CM
BH CV ECHO MEAS - LVIDD: 4.9 CM
BH CV ECHO MEAS - LVIDS: 2.8 CM
BH CV ECHO MEAS - LVOT AREA: 2.32 CM2
BH CV ECHO MEAS - LVOT DIAM: 1.72 CM
BH CV ECHO MEAS - LVPWD: 1.1 CM
BH CV ECHO MEAS - MED PEAK E' VEL: 8.6 CM/SEC
BH CV ECHO MEAS - MR MAX PG: 141.5 MMHG
BH CV ECHO MEAS - MR MAX VEL: 594.8 CM/SEC
BH CV ECHO MEAS - MV A DUR: 0.12 SEC
BH CV ECHO MEAS - MV A MAX VEL: 81.8 CM/SEC
BH CV ECHO MEAS - MV DEC SLOPE: 504.6 CM/SEC2
BH CV ECHO MEAS - MV DEC TIME: 0.21 SEC
BH CV ECHO MEAS - MV E MAX VEL: 84.4 CM/SEC
BH CV ECHO MEAS - MV E/A: 1.03
BH CV ECHO MEAS - MV MAX PG: 4.9 MMHG
BH CV ECHO MEAS - MV MEAN PG: 2.17 MMHG
BH CV ECHO MEAS - MV P1/2T: 69.8 MSEC
BH CV ECHO MEAS - MV V2 VTI: 40.3 CM
BH CV ECHO MEAS - MVA(P1/2T): 3.2 CM2
BH CV ECHO MEAS - MVA(VTI): 1.14 CM2
BH CV ECHO MEAS - PA ACC TIME: 0.12 SEC
BH CV ECHO MEAS - PA V2 MAX: 95 CM/SEC
BH CV ECHO MEAS - PULM A REVS DUR: 0.1 SEC
BH CV ECHO MEAS - PULM A REVS VEL: 30.8 CM/SEC
BH CV ECHO MEAS - PULM DIAS VEL: 30.4 CM/SEC
BH CV ECHO MEAS - PULM S/D: 1.25
BH CV ECHO MEAS - PULM SYS VEL: 38.1 CM/SEC
BH CV ECHO MEAS - QP/QS: 0.88
BH CV ECHO MEAS - RAP SYSTOLE: 3 MMHG
BH CV ECHO MEAS - RV MAX PG: 2.27 MMHG
BH CV ECHO MEAS - RV V1 MAX: 75.4 CM/SEC
BH CV ECHO MEAS - RV V1 VTI: 17.5 CM
BH CV ECHO MEAS - RVOT DIAM: 1.72 CM
BH CV ECHO MEAS - RVSP: 28 MMHG
BH CV ECHO MEAS - SI(MOD-SP2): 34.6 ML/M2
BH CV ECHO MEAS - SI(MOD-SP4): 36.9 ML/M2
BH CV ECHO MEAS - SUP REN AO DIAM: 2.1 CM
BH CV ECHO MEAS - SV(LVOT): 46 ML
BH CV ECHO MEAS - SV(MOD-SP2): 60 ML
BH CV ECHO MEAS - SV(MOD-SP4): 64 ML
BH CV ECHO MEAS - SV(RVOT): 40.6 ML
BH CV ECHO MEAS - TAPSE (>1.6): 1.97 CM
BH CV ECHO MEAS - TR MAX PG: 24.7 MMHG
BH CV ECHO MEAS - TR MAX VEL: 248.4 CM/SEC
BH CV ECHO MEASUREMENTS AVERAGE E/E' RATIO: 9.76
BH CV XLRA - RV BASE: 3.4 CM
BH CV XLRA - RV LENGTH: 6.3 CM
BH CV XLRA - RV MID: 2.49 CM
BH CV XLRA - TDI S': 10.2 CM/SEC
LEFT ATRIUM VOLUME INDEX: 27.5 ML/M2
SINUS: 2.32 CM
STJ: 2.25 CM

## 2023-10-19 PROCEDURE — 25510000001 PERFLUTREN (DEFINITY) 8.476 MG IN SODIUM CHLORIDE (PF) 0.9 % 10 ML INJECTION: Performed by: NURSE PRACTITIONER

## 2023-10-19 PROCEDURE — 93356 MYOCRD STRAIN IMG SPCKL TRCK: CPT

## 2023-10-19 PROCEDURE — 93306 TTE W/DOPPLER COMPLETE: CPT

## 2023-10-19 RX ADMIN — PERFLUTREN 1.5 ML: 6.52 INJECTION, SUSPENSION INTRAVENOUS at 08:10

## 2023-10-20 ENCOUNTER — TELEPHONE (OUTPATIENT)
Dept: CARDIOLOGY | Facility: CLINIC | Age: 58
End: 2023-10-20
Payer: COMMERCIAL

## 2023-10-20 NOTE — TELEPHONE ENCOUNTER
Pt is called.  She is sorry she had to leave the other day.      Can you please call her on the ECHO results?

## 2023-10-20 NOTE — TELEPHONE ENCOUNTER
Results and recommendations called to pt.  Instructed to call with any further questions or concerns.  Verbalized understanding.  Pt states that she would like to think about rescheduling, and will call back next week when she makes a decision about when she wants to come back.    Dana Mc RN  Triage Nurse, [facility]  10/20/23 16:21 EDT

## 2023-10-20 NOTE — TELEPHONE ENCOUNTER
Please let her know her echocardiogram looked good.  Her heart is strong and functioning well.  There was still some leakage from her mitral valve, but overall this was stable.  Her aortic valve also looked good.  Would continue current medications and call with questions or concerns.      Dana: She does need to have her appointment rescheduled.  She is a Cardio-onc patient and needs to see Dr. Fernandez.  Her appointment yesterday was canceled after she arrived due to a dental appointment.

## 2023-10-20 NOTE — TELEPHONE ENCOUNTER
Left voicemail for Santos Witt requesting callback.    Thank you,  Yara DESAI RN  Triage Nurse [facility]   15:39 EDT

## 2023-10-24 ENCOUNTER — TELEPHONE (OUTPATIENT)
Dept: CARDIOLOGY | Facility: CLINIC | Age: 58
End: 2023-10-24

## 2023-10-25 NOTE — TELEPHONE ENCOUNTER
I called patient at listed number and got voicemail.  I see other telephone message.  Will await callback.  Current echo shows mild to moderate mitral regurgitation and slight restriction to opening of the aortic valve in the setting of radiation therapy in the past.  If she is completely asymptomatic, can consider follow-up in 1 year and may possibly need echo at that time or at the latest in 2 years depending on clinical course and exam.

## 2024-02-08 ENCOUNTER — APPOINTMENT (OUTPATIENT)
Dept: GENERAL RADIOLOGY | Facility: HOSPITAL | Age: 59
End: 2024-02-08
Payer: COMMERCIAL

## 2024-02-08 ENCOUNTER — HOSPITAL ENCOUNTER (EMERGENCY)
Facility: HOSPITAL | Age: 59
Discharge: HOME | End: 2024-02-08
Attending: EMERGENCY MEDICINE
Payer: COMMERCIAL

## 2024-02-08 VITALS
OXYGEN SATURATION: 98 % | BODY MASS INDEX: 27.46 KG/M2 | HEART RATE: 71 BPM | SYSTOLIC BLOOD PRESSURE: 118 MMHG | HEIGHT: 63 IN | TEMPERATURE: 97.8 F | DIASTOLIC BLOOD PRESSURE: 69 MMHG | RESPIRATION RATE: 16 BRPM | WEIGHT: 155 LBS

## 2024-02-08 DIAGNOSIS — R55 NEAR SYNCOPE: Primary | ICD-10-CM

## 2024-02-08 DIAGNOSIS — I95.9 ACUTE HYPOTENSION: ICD-10-CM

## 2024-02-08 LAB
ALBUMIN SERPL-MCNC: 3.8 G/DL (ref 3.5–5.2)
ALBUMIN/GLOB SERPL: 1.2 G/DL
ALP SERPL-CCNC: 51 U/L (ref 39–117)
ALT SERPL W P-5'-P-CCNC: 18 U/L (ref 1–33)
ANION GAP SERPL CALCULATED.3IONS-SCNC: 11.4 MMOL/L (ref 5–15)
AST SERPL-CCNC: 29 U/L (ref 1–32)
BASOPHILS # BLD AUTO: 0.03 10*3/MM3 (ref 0–0.2)
BASOPHILS NFR BLD AUTO: 0.6 % (ref 0–1.5)
BILIRUB SERPL-MCNC: 0.2 MG/DL (ref 0–1.2)
BUN SERPL-MCNC: 19 MG/DL (ref 6–20)
BUN/CREAT SERPL: 23.5 (ref 7–25)
CALCIUM SPEC-SCNC: 8.5 MG/DL (ref 8.6–10.5)
CHLORIDE SERPL-SCNC: 103 MMOL/L (ref 98–107)
CO2 SERPL-SCNC: 21.6 MMOL/L (ref 22–29)
CREAT SERPL-MCNC: 0.81 MG/DL (ref 0.57–1)
DEPRECATED RDW RBC AUTO: 41.9 FL (ref 37–54)
EGFRCR SERPLBLD CKD-EPI 2021: 84.3 ML/MIN/1.73
EOSINOPHIL # BLD AUTO: 0.03 10*3/MM3 (ref 0–0.4)
EOSINOPHIL NFR BLD AUTO: 0.6 % (ref 0.3–6.2)
ERYTHROCYTE [DISTWIDTH] IN BLOOD BY AUTOMATED COUNT: 12.8 % (ref 12.3–15.4)
GLOBULIN UR ELPH-MCNC: 3.2 GM/DL
GLUCOSE SERPL-MCNC: 107 MG/DL (ref 65–99)
HCT VFR BLD AUTO: 29.5 % (ref 34–46.6)
HGB BLD-MCNC: 9.8 G/DL (ref 12–15.9)
IMM GRANULOCYTES # BLD AUTO: 0 10*3/MM3 (ref 0–0.05)
IMM GRANULOCYTES NFR BLD AUTO: 0 % (ref 0–0.5)
LYMPHOCYTES # BLD AUTO: 1.68 10*3/MM3 (ref 0.7–3.1)
LYMPHOCYTES NFR BLD AUTO: 32.7 % (ref 19.6–45.3)
MAGNESIUM SERPL-MCNC: 2 MG/DL (ref 1.6–2.6)
MCH RBC QN AUTO: 30.1 PG (ref 26.6–33)
MCHC RBC AUTO-ENTMCNC: 33.2 G/DL (ref 31.5–35.7)
MCV RBC AUTO: 90.5 FL (ref 79–97)
MONOCYTES # BLD AUTO: 0.43 10*3/MM3 (ref 0.1–0.9)
MONOCYTES NFR BLD AUTO: 8.4 % (ref 5–12)
NEUTROPHILS NFR BLD AUTO: 2.97 10*3/MM3 (ref 1.7–7)
NEUTROPHILS NFR BLD AUTO: 57.7 % (ref 42.7–76)
NRBC BLD AUTO-RTO: 0 /100 WBC (ref 0–0.2)
NT-PROBNP SERPL-MCNC: 99.1 PG/ML (ref 0–900)
PLATELET # BLD AUTO: 199 10*3/MM3 (ref 140–450)
PMV BLD AUTO: 10.3 FL (ref 6–12)
POTASSIUM SERPL-SCNC: 3.5 MMOL/L (ref 3.5–5.2)
PROT SERPL-MCNC: 7 G/DL (ref 6–8.5)
RBC # BLD AUTO: 3.26 10*6/MM3 (ref 3.77–5.28)
SODIUM SERPL-SCNC: 136 MMOL/L (ref 136–145)
TROPONIN T SERPL HS-MCNC: 9 NG/L
WBC NRBC COR # BLD AUTO: 5.14 10*3/MM3 (ref 3.4–10.8)

## 2024-02-08 PROCEDURE — 80053 COMPREHEN METABOLIC PANEL: CPT | Performed by: PHYSICIAN ASSISTANT

## 2024-02-08 PROCEDURE — 83735 ASSAY OF MAGNESIUM: CPT | Performed by: PHYSICIAN ASSISTANT

## 2024-02-08 PROCEDURE — 99284 EMERGENCY DEPT VISIT MOD MDM: CPT

## 2024-02-08 PROCEDURE — 84484 ASSAY OF TROPONIN QUANT: CPT | Performed by: PHYSICIAN ASSISTANT

## 2024-02-08 PROCEDURE — 85025 COMPLETE CBC W/AUTO DIFF WBC: CPT | Performed by: PHYSICIAN ASSISTANT

## 2024-02-08 PROCEDURE — 93010 ELECTROCARDIOGRAM REPORT: CPT | Performed by: INTERNAL MEDICINE

## 2024-02-08 PROCEDURE — 83880 ASSAY OF NATRIURETIC PEPTIDE: CPT | Performed by: PHYSICIAN ASSISTANT

## 2024-02-08 PROCEDURE — 93005 ELECTROCARDIOGRAM TRACING: CPT | Performed by: PHYSICIAN ASSISTANT

## 2024-02-08 PROCEDURE — 71045 X-RAY EXAM CHEST 1 VIEW: CPT

## 2024-02-08 RX ORDER — SODIUM CHLORIDE 0.9 % (FLUSH) 0.9 %
10 SYRINGE (ML) INJECTION AS NEEDED
Status: DISCONTINUED | OUTPATIENT
Start: 2024-02-08 | End: 2024-02-08 | Stop reason: HOSPADM

## 2024-02-08 NOTE — ED PROVIDER NOTES
EMERGENCY DEPARTMENT ENCOUNTER  Room Number:  04/04  PCP: Sheridan Richardson MD  Independent Historians: Patient and Family      HPI:  Chief Complaint: had concerns including Syncope.     A complete HPI/ROS/PMH/PSH/SH/FH are unobtainable due to: None    Chronic or social conditions impacting patient care (Social Determinants of Health): None      Context: Santos Witt is a 58 y.o. female with a medical history of MGUS, arthritis, and breast cancer who presents to the ED c/o acute near syncope.  Patient reports that she had just finished eating lunch with her aunt for her birthday when she became lightheaded.  Reports she needed to lay her head down but did not fully lose consciousness.  EMS was called and on arrival she was noted to have blood pressure of 60/40.  IV fluids started and route with improvement in BP.  Reports she has had intermittent episodes of this over the last 10 years after being diagnosed with breast cancer.  She did not fall to the ground or sustain any injuries.  She denies associated diaphoresis, nausea, vomiting, chest pain.  Patient has no other systemic complaints at this time.      Review of prior external notes (non-ED) -and- Review of prior external test results outside of this encounter: Patient seen in office by oncology on 8/31/2023 for MGUS.  Reviewed assessment and plan.  Reviewed assessment and plan.  Patient scheduled for tilt test and will follow-up in office in 12 months.  Reviewed labs collected on 10/24/2023.  Hemoglobin A1c 5.7, CMP with creatinine 0.74.    Prescription drug monitoring program review:     N/A    PAST MEDICAL HISTORY  Active Ambulatory Problems     Diagnosis Date Noted    Bilateral malignant neoplasm of upper outer quadrant of breast in female 04/15/2016    Chronic pain of left knee 01/11/2018    Acute pain of left knee 01/11/2018    Contusion of left knee 01/11/2018    Arthritis of left knee 02/09/2018    Chondromalacia, patella, left 02/09/2018     MGUS (monoclonal gammopathy of unknown significance) 06/08/2018    Arthritis of left hip 01/16/2019    Left hip pain 01/16/2019    Status post administration of cardiotoxic chemotherapy 06/23/2021    Precordial pain 06/23/2021     Resolved Ambulatory Problems     Diagnosis Date Noted    No Resolved Ambulatory Problems     Past Medical History:   Diagnosis Date    Acquired hypothyroidism     Anemia     Arthritis     Asthma     Cancer 2013    Chest pain     H/O Chondromalacia patellae, left     H/O Regurgitation, mitral/triscupid, mild     H/O Syncope w/collapse     Heart murmur     History of chronic fatigue     Hx of bladder infections     Lymphedema     Migraines     MVP (mitral valve prolapse)     Pneumonia          PAST SURGICAL HISTORY  Past Surgical History:   Procedure Laterality Date    BREAST BIOPSY Left 08/2013    x2     BREAST CYST EXCISION Left 1982    Fibroid cyst left nipple    BREAST SURGERY Bilateral 09/2013    Mastectomy    HYSTERECTOMY  06/2011    Partial    KNEE ARTHROSCOPY Left 2006    PELVIC LAPAROSCOPY  2002    cysts removed    TOTAL HIP ARTHROPLASTY Left 2019         FAMILY HISTORY  Family History   Problem Relation Age of Onset    Kidney cancer Mother         Treated surgically    Heart disease Mother     Hypertension Mother     Thyroid disease Mother     Lung cancer Mother     Heart attack Mother     Hypertension Father     Heart disease Father     Heart attack Father     Thyroid disease Sister     No Known Problems Brother     Breast cancer Paternal Aunt     Heart disease Paternal Aunt     Kidney cancer Paternal Uncle     Colon cancer Paternal Grandfather         In his 60s    Breast cancer Paternal Aunt     Diabetes Maternal Aunt     Pancreatic cancer Other     Heart disease Maternal Grandmother     Heart attack Maternal Grandmother     Heart disease Paternal Grandmother     Heart attack Paternal Grandmother          SOCIAL HISTORY  Social History     Socioeconomic History    Marital  status: Single    Number of children: 0    Years of education: High school   Tobacco Use    Smoking status: Never    Smokeless tobacco: Never   Vaping Use    Vaping Use: Never used   Substance and Sexual Activity    Alcohol use: Yes     Comment: Very little    Drug use: No    Sexual activity: Defer         ALLERGIES  Ciprofloxacin, Sulfa antibiotics, Penicillins, and Amoxicillin      REVIEW OF SYSTEMS  Review of Systems   Constitutional:  Negative for chills and fever.   HENT:  Negative for ear pain and sore throat.    Respiratory:  Negative for cough and shortness of breath.    Cardiovascular:  Negative for chest pain and palpitations.   Gastrointestinal:  Negative for abdominal pain and vomiting.   Genitourinary:  Negative for dysuria and hematuria.   Musculoskeletal:  Negative for arthralgias and joint swelling.   Skin:  Negative for pallor and rash.   Neurological:  Positive for light-headedness. Negative for numbness and headaches.   Psychiatric/Behavioral:  Negative for confusion and hallucinations.      Included in HPI  All systems reviewed and negative except for those discussed in HPI.      PHYSICAL EXAM    I have reviewed the triage vital signs and nursing notes.    ED Triage Vitals [02/08/24 1619]   Temp Heart Rate Resp BP SpO2   97.8 °F (36.6 °C) 66 18 107/56 98 %      Temp src Heart Rate Source Patient Position BP Location FiO2 (%)   Tympanic Monitor Sitting Right arm --       Physical Exam  Constitutional:       General: She is not in acute distress.     Appearance: She is well-developed.   HENT:      Head: Normocephalic and atraumatic.   Eyes:      Extraocular Movements: Extraocular movements intact.   Cardiovascular:      Rate and Rhythm: Normal rate and regular rhythm.      Heart sounds: Normal heart sounds.      Comments: Distal pulses intact  Pulmonary:      Effort: Pulmonary effort is normal.      Breath sounds: Normal breath sounds.   Abdominal:      General: There is no distension.   Skin:      General: Skin is warm.   Neurological:      General: No focal deficit present.      Mental Status: She is alert and oriented to person, place, and time.   Psychiatric:         Mood and Affect: Mood normal.           LAB RESULTS  Recent Results (from the past 24 hour(s))   Comprehensive Metabolic Panel    Collection Time: 02/08/24  4:50 PM    Specimen: Blood   Result Value Ref Range    Glucose 107 (H) 65 - 99 mg/dL    BUN 19 6 - 20 mg/dL    Creatinine 0.81 0.57 - 1.00 mg/dL    Sodium 136 136 - 145 mmol/L    Potassium 3.5 3.5 - 5.2 mmol/L    Chloride 103 98 - 107 mmol/L    CO2 21.6 (L) 22.0 - 29.0 mmol/L    Calcium 8.5 (L) 8.6 - 10.5 mg/dL    Total Protein 7.0 6.0 - 8.5 g/dL    Albumin 3.8 3.5 - 5.2 g/dL    ALT (SGPT) 18 1 - 33 U/L    AST (SGOT) 29 1 - 32 U/L    Alkaline Phosphatase 51 39 - 117 U/L    Total Bilirubin 0.2 0.0 - 1.2 mg/dL    Globulin 3.2 gm/dL    A/G Ratio 1.2 g/dL    BUN/Creatinine Ratio 23.5 7.0 - 25.0    Anion Gap 11.4 5.0 - 15.0 mmol/L    eGFR 84.3 >60.0 mL/min/1.73   Single High Sensitivity Troponin T    Collection Time: 02/08/24  4:50 PM    Specimen: Blood   Result Value Ref Range    HS Troponin T 9 <14 ng/L   BNP    Collection Time: 02/08/24  4:50 PM    Specimen: Blood   Result Value Ref Range    proBNP 99.1 0.0 - 900.0 pg/mL   Magnesium    Collection Time: 02/08/24  4:50 PM    Specimen: Blood   Result Value Ref Range    Magnesium 2.0 1.6 - 2.6 mg/dL   CBC Auto Differential    Collection Time: 02/08/24  4:50 PM    Specimen: Blood   Result Value Ref Range    WBC 5.14 3.40 - 10.80 10*3/mm3    RBC 3.26 (L) 3.77 - 5.28 10*6/mm3    Hemoglobin 9.8 (L) 12.0 - 15.9 g/dL    Hematocrit 29.5 (L) 34.0 - 46.6 %    MCV 90.5 79.0 - 97.0 fL    MCH 30.1 26.6 - 33.0 pg    MCHC 33.2 31.5 - 35.7 g/dL    RDW 12.8 12.3 - 15.4 %    RDW-SD 41.9 37.0 - 54.0 fl    MPV 10.3 6.0 - 12.0 fL    Platelets 199 140 - 450 10*3/mm3    Neutrophil % 57.7 42.7 - 76.0 %    Lymphocyte % 32.7 19.6 - 45.3 %    Monocyte % 8.4 5.0 - 12.0 %     Eosinophil % 0.6 0.3 - 6.2 %    Basophil % 0.6 0.0 - 1.5 %    Immature Grans % 0.0 0.0 - 0.5 %    Neutrophils, Absolute 2.97 1.70 - 7.00 10*3/mm3    Lymphocytes, Absolute 1.68 0.70 - 3.10 10*3/mm3    Monocytes, Absolute 0.43 0.10 - 0.90 10*3/mm3    Eosinophils, Absolute 0.03 0.00 - 0.40 10*3/mm3    Basophils, Absolute 0.03 0.00 - 0.20 10*3/mm3    Immature Grans, Absolute 0.00 0.00 - 0.05 10*3/mm3    nRBC 0.0 0.0 - 0.2 /100 WBC   ECG 12 Lead Syncope    Collection Time: 02/08/24  4:55 PM   Result Value Ref Range    QT Interval 409 ms    QTC Interval 436 ms         RADIOLOGY  XR Chest 1 View    Result Date: 2/8/2024  XR CHEST 1 VW-  HISTORY: Female who is 58 years-old, near syncope  TECHNIQUE: Frontal view of the chest  COMPARISON: 4/18/2023  FINDINGS: The heart size is borderline. Pulmonary vasculature is unremarkable. No focal pulmonary consolidation, pleural effusion, or pneumothorax. Old granulomatous disease is present. No acute osseous process.      No evidence for acute pulmonary process. Borderline heart size. Follow-up as clinical indications persist.  This report was finalized on 2/8/2024 6:10 PM by Dr. Darvin Hernandes M.D on Workstation: [id number]         MEDICATIONS GIVEN IN ER  Medications   sodium chloride 0.9 % flush 10 mL (has no administration in time range)         ORDERS PLACED DURING THIS VISIT:  Orders Placed This Encounter   Procedures    XR Chest 1 View    Comprehensive Metabolic Panel    Single High Sensitivity Troponin T    BNP    Magnesium    CBC Auto Differential    Orthostatic Vitals    ECG 12 Lead Syncope    Insert Peripheral IV    CBC & Differential         OUTPATIENT MEDICATION MANAGEMENT:  Current Facility-Administered Medications Ordered in Epic   Medication Dose Route Frequency Provider Last Rate Last Admin    sodium chloride 0.9 % flush 10 mL  10 mL Intravenous PRN Meryl Younger PA-C         Current Outpatient Medications Ordered in Epic   Medication Sig Dispense Refill     Acetaminophen 500 MG capsule Take  by mouth.      albuterol (PROVENTIL) (2.5 MG/3ML) 0.083% nebulizer solution Take 2.5 mg by nebulization As Needed.      albuterol sulfate  (90 Base) MCG/ACT inhaler Inhale 2 puffs Every 4 (Four) Hours As Needed for Wheezing.      ascorbic acid (VITAMIN C) 500 MG tablet Take 2 tablets by mouth Daily.      B Complex Vitamins (VITAMIN B COMPLEX) capsule capsule Take  by mouth Daily.      Cholecalciferol (VITAMIN D3) 5000 UNITS capsule capsule Take 1 capsule by mouth 1 (One) Time Per Week.      EPINEPHrine (EPIPEN) 0.3 MG/0.3ML solution auto-injector injection INJECT 0.3 MILLILITER (0.3 MG) BY INTRAMUSCULAR ROUTE ONCE AS NEEDED FOR ANAPHYLAXIS  0    levothyroxine (SYNTHROID, LEVOTHROID) 75 MCG tablet Take 1 tablet by mouth.      LORATADINE PO Take  by mouth.      magnesium oxide (MAGOX) 400 (241.3 Mg) MG tablet tablet Take 1 tablet by mouth Daily.      mometasone (NASONEX) 50 MCG/ACT nasal spray mometasone 50 mcg/actuation nasal spray   SPRAY 2 SPRAYS IN EACH NOSTRIL BY INTRANASAL ROUTE ONCE DAILY      Multiple Vitamin (MULTI-VITAMINS PO) Take  by mouth daily.      Omega-3 Fatty Acids (OMEGA 3 500 PO) Take  by mouth.      PATIENT SUPPLIED ALLERGY INJECTION Inject  under the skin into the appropriate area as directed. Biweekly      sertraline (ZOLOFT) 50 MG tablet Take 1 tablet by mouth Daily.      Trelegy Ellipta 200-62.5-25 MCG/ACT aerosol powder  Inhale 1 puff Daily.             PROGRESS, DATA ANALYSIS, CONSULTS, AND MEDICAL DECISION MAKING  All labs have been independently interpreted by me.  All radiology studies have been reviewed by me. All EKG's have been independently viewed and interpreted by me.  Discussion below represents my analysis of pertinent findings related to patient's condition, differential diagnosis, treatment plan and final disposition.    Differential diagnosis includes but is not limited to orthostatic syncope, vasovagal syncope, electrolyte  deficiency.        ED Course as of 02/08/24 2119   Thu Feb 08, 2024   1742 EKG ER MD interpretation   Time: 16: 55  Rhythm and rate: Normal sinus rhythm at a rate of 68  Axis: Normal  P waves: Normal except for left atrial enlargement  QRS complexes: Normal  ST segments: no elevation nor depressions  T waves: T wave inversion in V1  No significant change when compared to EKG done April 18, 2023 [AR]   1936 Reassessed the patient.  She feels improved and BP has improved after IV fluids.  Workup today grossly benign.  This has been ongoing for several years.  Will have her follow-up with PCP and cardiology.  Discussed ED return precautions with the patient.  She is otherwise well-appearing, hemodynamically stable, and therefore appropriate for discharge. [MP]      ED Course User Index  [AR] Jimena Reyes MD  [MP] Meryl Younger PA-C             AS OF 21:18 EST VITALS:    BP - 118/69  HR - 71  TEMP - 97.8 °F (36.6 °C) (Tympanic)  O2 SATS - 98%    COMPLEXITY OF CARE  Admission was considered but after careful review of the patient's presentation, physical examination, diagnostic results, and response to treatment the patient may be safely discharged with outpatient follow-up.      DIAGNOSIS  Final diagnoses:   Near syncope   Acute hypotension         DISPOSITION  ED Disposition       ED Disposition   Discharge    Condition   Stable    Comment   --                Please note that portions of this document were completed with a voice recognition program.    Note Disclaimer: At [facility], we believe that sharing information builds trust and better relationships. You are receiving this note because you recently visited [facility]. It is possible you will see health information before a provider has talked with you about it. This kind of information can be easy to misunderstand. To help you fully understand what it means for your health, we urge you to discuss this note with your provider.          Meryl Younger PA-C  02/08/24 9871

## 2024-02-08 NOTE — ED PROVIDER NOTES
MD ATTESTATION NOTE    SHARED VISIT: This visit was performed by BOTH a physician and an APC. The substantive portion of the medical decision making was performed by this attesting physician who made or approved the management plan and takes responsibility for patient management. All studies in the APC note (if performed) were independently interpreted by me.     The RODRICK and I have discussed this patient's history, physical exam, and treatment plan.  I have reviewed the documentation and affirm the documentation and agree with the treatment and plan.  The attached note describes my personal findings.      Independent Historians: Patient    A complete HPI/ROS/PMH/PSH/SH/FH are unobtainable due to: None    Chronic or social conditions impacting patient care (social determinants of health): None    Santos Witt is a 58 y.o. female with a history of breast cancer and arthritis as well as syncopal episodes for the past 10 years who presents to the ED c/o acute syncopal episode while eating lunch with her aunt.  It was her aunt's birthday and she became lightheaded and clammy and laid her head down.  Patient says she could still hear her family members around her but was unable to respond.  Her aunt sitting at bedside says she was completely out of it.  EMS was called and upon their arrival patient's blood pressure was 60/40.  Patient was given IV fluids on her way to the hospital.  Patient says she has had an extensive evaluation for syncopal episodes over the past 10 years including tilt table test, Holter monitors, neurology evaluations.  No etiology for her syncopal episodes have ever been elucidated.          On exam:  GENERAL: Pleasant cooperative and conversant female, well-appearing, alert, no acute distress  SKIN: Warm, dry  HENT: Normocephalic, atraumatic  EYES: no scleral icterus, EOMI  CV: regular rhythm, regular rate  RESPIRATORY: normal effort, lungs clear, no wheezing, no rhonchi  ABDOMEN: soft,  nontender, nondistended  MUSCULOSKELETAL: no deformity  NEURO: alert, moves all extremities, follows commands                                                             Labs  No results found for this or any previous visit (from the past 24 hour(s)).      Radiology  No Radiology Exams Resulted Within Past 24 Hours    Medical Decision Making:  ED Course as of 02/10/24 0025   u Feb 08, 2024   1742 EKG ER MD interpretation   Time: 16: 55  Rhythm and rate: Normal sinus rhythm at a rate of 68  Axis: Normal  P waves: Normal except for left atrial enlargement  QRS complexes: Normal  ST segments: no elevation nor depressions  T waves: T wave inversion in V1  No significant change when compared to EKG done April 18, 2023 [AR]   1936 Reassessed the patient.  She feels improved and BP has improved after IV fluids.  Workup today grossly benign.  This has been ongoing for several years.  Will have her follow-up with PCP and cardiology.  Discussed ED return precautions with the patient.  She is otherwise well-appearing, hemodynamically stable, and therefore appropriate for discharge. [MP]      ED Course User Index  [AR] Jimena Reyes MD  [MP] Meryl Younger, [place]       MDM: The differential diagnosis for syncope, collapse, or even near syncope/lightheadedness is quite broad and includes but is not limited to: hypoglycemia, orthostasis, vasovagal syncope, seizure, cardiac syncope, PE, ectopic pregnancy, profound anemia, aortic dissection, severe aortic stenosis, stroke, sah, gi bleeding, intoxication and medication effects.        Mal[place]ian Syncope Risk score (for 30 day events): 2  Applicable to patients =16 years old presenting =24 hours of syncope. Do not use if: prolonged (>5 min) LOC, change in mental status from baseline, obvious witnessed seizure, major trauma, intoxication, language barrier, or head trauma causing LOC. This calculator is externally validated, according to data presented at the Society for  Academic Emergency Medicine Annual Meeting 2018.  Nine measures:  Vasovagal symptom predisposition (Triggered by being in a warm crowded place, prolonged standing, fear, emotion, or pain) (-1)  Heart disease history CAD, atrial fibrillation or flutter, CHF, valvular disease (+1)  Any systolic Blood Pressure reading <90 mmHg or >180 mmHg (+2)  Troponin elevated (+2)  QRS Axis abnormal (<30 or >100 degrees) (+1)  QRS Duration >130 ms (+1)  QTc >480 ms (+2)  ED diagnosis based on eval:   Vasovagal Syncope (-2)  Cardiac Syncope (+2)  (Neither (0))  ___________________________________  Score -3 to 0: Very low risk (serious adverse events <=1.9% in 30 days)  Score 1 to 3: Medium risk (serious adverse events >=3% in 30 days)  Score 4 to 5: High risk  Score >=6: Very high risk      Procedures:  Procedures        PPE: I followed hospital protocols for proper PPE based on patient presentation including use of N95 mask for suspected infectious respiratory conditions.  Proper hand hygiene was performed both before and after the patient encounter.          Diagnosis  Final diagnoses:   Near syncope   Acute hypotension       Note Disclaimer: At [facility], we believe that sharing information builds trust and better relationships. You are receiving this note because you recently visited [facility]. It is possible you will see health information before a provider has talked with you about it. This kind of information can be easy to misunderstand. To help you fully understand what it means for your health, we urge you to discuss this note with your provider.       Jimena Reyes MD  02/08/24 2049       Jimena Reyes MD  02/10/24 0025

## 2024-02-09 LAB
QT INTERVAL: 409 MS
QTC INTERVAL: 436 MS

## 2024-02-09 NOTE — ED NOTES
This nurse explained DC instruction to patient and family with all questions answered. Pt verbalized understanding.

## 2024-02-09 NOTE — DISCHARGE INSTRUCTIONS
Follow-up with primary care provider and with Dr. Fernandez with cardiology.  Take all of your home medications as prescribed.  Return to ED for any worsening symptoms.

## 2024-02-09 NOTE — ED NOTES
Pt ambulated to and from restroom with a string and steady gait with no need of assistance from this nurse. Pt denies dizziness at this time

## 2024-02-19 ENCOUNTER — TELEPHONE (OUTPATIENT)
Dept: ONCOLOGY | Facility: CLINIC | Age: 59
End: 2024-02-19
Payer: COMMERCIAL

## 2024-02-19 NOTE — TELEPHONE ENCOUNTER
"    Caller: Santos Witt \"KARSON\"    Relationship to patient: Self    Best call back number: [phone]    Chief complaint: R/S    Type of visit: LAB, VITAL AND F/U    Requested date: LAB ON 3-21 VITAL AND F/U ON 3-28    If rescheduling, when is the original appointment: 2-22, 2-29    Additional notes: HUB UNABLE TO R/S F/U1            "

## 2024-03-21 ENCOUNTER — LAB (OUTPATIENT)
Dept: LAB | Facility: HOSPITAL | Age: 59
End: 2024-03-21
Payer: COMMERCIAL

## 2024-03-21 DIAGNOSIS — D47.2 MGUS (MONOCLONAL GAMMOPATHY OF UNKNOWN SIGNIFICANCE): ICD-10-CM

## 2024-03-21 LAB
ALBUMIN SERPL-MCNC: 4 G/DL (ref 3.5–5.2)
ALBUMIN/GLOB SERPL: 1.1 G/DL
ALP SERPL-CCNC: 62 U/L (ref 39–117)
ALT SERPL W P-5'-P-CCNC: 12 U/L (ref 1–33)
ANION GAP SERPL CALCULATED.3IONS-SCNC: 9.4 MMOL/L (ref 5–15)
AST SERPL-CCNC: 22 U/L (ref 1–32)
B2 MICROGLOB SERPL-MCNC: 2.5 MG/L (ref 0.8–2.2)
BASOPHILS # BLD AUTO: 0.03 10*3/MM3 (ref 0–0.2)
BASOPHILS NFR BLD AUTO: 0.7 % (ref 0–1.5)
BILIRUB SERPL-MCNC: 0.4 MG/DL (ref 0–1.2)
BUN SERPL-MCNC: 15 MG/DL (ref 6–20)
BUN/CREAT SERPL: 14.4 (ref 7–25)
CALCIUM SPEC-SCNC: 9.1 MG/DL (ref 8.6–10.5)
CHLORIDE SERPL-SCNC: 105 MMOL/L (ref 98–107)
CO2 SERPL-SCNC: 24.6 MMOL/L (ref 22–29)
CREAT SERPL-MCNC: 1.04 MG/DL (ref 0.57–1)
DEPRECATED RDW RBC AUTO: 48.4 FL (ref 37–54)
EGFRCR SERPLBLD CKD-EPI 2021: 62 ML/MIN/1.73
EOSINOPHIL # BLD AUTO: 0.04 10*3/MM3 (ref 0–0.4)
EOSINOPHIL NFR BLD AUTO: 1 % (ref 0.3–6.2)
ERYTHROCYTE [DISTWIDTH] IN BLOOD BY AUTOMATED COUNT: 14.6 % (ref 12.3–15.4)
GLOBULIN UR ELPH-MCNC: 3.5 GM/DL
GLUCOSE SERPL-MCNC: 90 MG/DL (ref 65–99)
HCT VFR BLD AUTO: 31.8 % (ref 34–46.6)
HGB BLD-MCNC: 10.9 G/DL (ref 12–15.9)
IMM GRANULOCYTES # BLD AUTO: 0 10*3/MM3 (ref 0–0.05)
IMM GRANULOCYTES NFR BLD AUTO: 0 % (ref 0–0.5)
LYMPHOCYTES # BLD AUTO: 1.5 10*3/MM3 (ref 0.7–3.1)
LYMPHOCYTES NFR BLD AUTO: 37.3 % (ref 19.6–45.3)
MCH RBC QN AUTO: 31.1 PG (ref 26.6–33)
MCHC RBC AUTO-ENTMCNC: 34.3 G/DL (ref 31.5–35.7)
MCV RBC AUTO: 90.6 FL (ref 79–97)
MONOCYTES # BLD AUTO: 0.41 10*3/MM3 (ref 0.1–0.9)
MONOCYTES NFR BLD AUTO: 10.2 % (ref 5–12)
NEUTROPHILS NFR BLD AUTO: 2.04 10*3/MM3 (ref 1.7–7)
NEUTROPHILS NFR BLD AUTO: 50.8 % (ref 42.7–76)
NRBC BLD AUTO-RTO: 0 /100 WBC (ref 0–0.2)
PLATELET # BLD AUTO: 253 10*3/MM3 (ref 140–450)
PMV BLD AUTO: 9.5 FL (ref 6–12)
POTASSIUM SERPL-SCNC: 4.1 MMOL/L (ref 3.5–5.2)
PROT SERPL-MCNC: 7.5 G/DL (ref 6–8.5)
RBC # BLD AUTO: 3.51 10*6/MM3 (ref 3.77–5.28)
SODIUM SERPL-SCNC: 139 MMOL/L (ref 136–145)
WBC NRBC COR # BLD AUTO: 4.02 10*3/MM3 (ref 3.4–10.8)

## 2024-03-21 PROCEDURE — 36415 COLL VENOUS BLD VENIPUNCTURE: CPT

## 2024-03-21 PROCEDURE — 80053 COMPREHEN METABOLIC PANEL: CPT

## 2024-03-21 PROCEDURE — 82232 ASSAY OF BETA-2 PROTEIN: CPT | Performed by: INTERNAL MEDICINE

## 2024-03-21 PROCEDURE — 85025 COMPLETE CBC W/AUTO DIFF WBC: CPT

## 2024-03-22 LAB
ALBUMIN SERPL ELPH-MCNC: 3.8 G/DL (ref 2.9–4.4)
ALBUMIN/GLOB SERPL: 1.2 {RATIO} (ref 0.7–1.7)
ALPHA1 GLOB SERPL ELPH-MCNC: 0.2 G/DL (ref 0–0.4)
ALPHA2 GLOB SERPL ELPH-MCNC: 0.6 G/DL (ref 0.4–1)
B-GLOBULIN SERPL ELPH-MCNC: 0.9 G/DL (ref 0.7–1.3)
GAMMA GLOB SERPL ELPH-MCNC: 1.7 G/DL (ref 0.4–1.8)
GLOBULIN SER-MCNC: 3.3 G/DL (ref 2.2–3.9)
IGA SERPL-MCNC: 86 MG/DL (ref 87–352)
IGG SERPL-MCNC: 1996 MG/DL (ref 586–1602)
IGM SERPL-MCNC: 26 MG/DL (ref 26–217)
INTERPRETATION SERPL IEP-IMP: ABNORMAL
KAPPA LC FREE SER-MCNC: 118 MG/L (ref 3.3–19.4)
KAPPA LC FREE/LAMBDA FREE SER: 13.72 {RATIO} (ref 0.26–1.65)
LABORATORY COMMENT REPORT: ABNORMAL
LAMBDA LC FREE SERPL-MCNC: 8.6 MG/L (ref 5.7–26.3)
M PROTEIN SERPL ELPH-MCNC: 1.3 G/DL
PROT SERPL-MCNC: 7.1 G/DL (ref 6–8.5)

## 2024-03-28 ENCOUNTER — OFFICE VISIT (OUTPATIENT)
Dept: ONCOLOGY | Facility: CLINIC | Age: 59
End: 2024-03-28
Payer: COMMERCIAL

## 2024-03-28 ENCOUNTER — APPOINTMENT (OUTPATIENT)
Dept: LAB | Facility: HOSPITAL | Age: 59
End: 2024-03-28
Payer: COMMERCIAL

## 2024-03-28 ENCOUNTER — TELEPHONE (OUTPATIENT)
Dept: ONCOLOGY | Facility: CLINIC | Age: 59
End: 2024-03-28
Payer: COMMERCIAL

## 2024-03-28 ENCOUNTER — TELEPHONE (OUTPATIENT)
Dept: ONCOLOGY | Facility: CLINIC | Age: 59
End: 2024-03-28

## 2024-03-28 VITALS
OXYGEN SATURATION: 97 % | WEIGHT: 154.3 LBS | DIASTOLIC BLOOD PRESSURE: 88 MMHG | TEMPERATURE: 98.1 F | BODY MASS INDEX: 27.34 KG/M2 | RESPIRATION RATE: 18 BRPM | HEIGHT: 63 IN | SYSTOLIC BLOOD PRESSURE: 160 MMHG | HEART RATE: 84 BPM

## 2024-03-28 DIAGNOSIS — M35.00 SJOGREN SYNDROME, UNSPECIFIED: ICD-10-CM

## 2024-03-28 DIAGNOSIS — Z17.0 MALIGNANT NEOPLASM OF UPPER-OUTER QUADRANT OF BOTH BREASTS IN FEMALE, ESTROGEN RECEPTOR POSITIVE: Primary | ICD-10-CM

## 2024-03-28 DIAGNOSIS — C50.411 MALIGNANT NEOPLASM OF UPPER-OUTER QUADRANT OF BOTH BREASTS IN FEMALE, ESTROGEN RECEPTOR POSITIVE: Primary | ICD-10-CM

## 2024-03-28 DIAGNOSIS — D47.2 MGUS (MONOCLONAL GAMMOPATHY OF UNKNOWN SIGNIFICANCE): Primary | ICD-10-CM

## 2024-03-28 DIAGNOSIS — C50.412 MALIGNANT NEOPLASM OF UPPER-OUTER QUADRANT OF BOTH BREASTS IN FEMALE, ESTROGEN RECEPTOR POSITIVE: Primary | ICD-10-CM

## 2024-03-28 DIAGNOSIS — D47.2 MGUS (MONOCLONAL GAMMOPATHY OF UNKNOWN SIGNIFICANCE): ICD-10-CM

## 2024-03-28 LAB
CHROMATIN AB SERPL-ACNC: <10 IU/ML (ref 0–14)
ERYTHROCYTE [SEDIMENTATION RATE] IN BLOOD: 17 MM/HR (ref 0–30)
FOLATE SERPL-MCNC: >20 NG/ML (ref 4.78–24.2)
HAPTOGLOB SERPL-MCNC: 116 MG/DL (ref 30–200)

## 2024-03-28 PROCEDURE — 36415 COLL VENOUS BLD VENIPUNCTURE: CPT | Performed by: INTERNAL MEDICINE

## 2024-03-28 PROCEDURE — 82746 ASSAY OF FOLIC ACID SERUM: CPT | Performed by: INTERNAL MEDICINE

## 2024-03-28 PROCEDURE — 85652 RBC SED RATE AUTOMATED: CPT | Performed by: INTERNAL MEDICINE

## 2024-03-28 PROCEDURE — 83010 ASSAY OF HAPTOGLOBIN QUANT: CPT | Performed by: INTERNAL MEDICINE

## 2024-03-28 PROCEDURE — 86431 RHEUMATOID FACTOR QUANT: CPT | Performed by: INTERNAL MEDICINE

## 2024-03-28 RX ORDER — METHYLPREDNISOLONE 4 MG/1
TABLET ORAL
COMMUNITY
Start: 2024-02-16

## 2024-03-28 RX ORDER — IBUPROFEN 600 MG/1
TABLET ORAL
COMMUNITY
Start: 2024-01-29

## 2024-03-28 NOTE — TELEPHONE ENCOUNTER
Returned pts call after reviewing request with Dr. Stinson. Order placed for thoracic spine MRI and message sent to scheduling to arrange the Brain MRI and thoracic MRI at next available time.

## 2024-03-28 NOTE — TELEPHONE ENCOUNTER
----- Message from Jennifer Rodríguez RN sent at 3/28/2024  3:00 PM EDT -----  Regarding: MRI  Needs brain MRI and Thoracic spine MRI.I think DR. Cunningham's check out note was misleading. MRI's need to be done next available, not in 6 months.  Thanks! I have added the thoracic MRI as Dr. Cunningham didn't have that order in yet. Thanks

## 2024-03-28 NOTE — PROGRESS NOTES
HISTORY & PHYSICAL      Patient: Grazyna Mitchell                MRN: [id number]       SSN: [id number]  YOB: 1968        AGE: 55 y.o.        SEX: female  Body mass index is 35.19 kg/m².    PCP: Rosa Souza MD  01/10/24      CC: right knee patellar maltracking, sp TKA  Problem List Items Addressed This Visit    None  Visit Diagnoses       Patellar maltracking, right    -  Primary    Relevant Orders    [33778] Knee 4V    Presence of right artificial knee joint        Relevant Orders    [[id number]] Knee 4V    Preop examination        Relevant Orders    [[id number]] Knee 4V              HPI:  The patient is a pleasant 55 y.o. whom has end stage OA of their right knee and has failed conservative treatment including but not limited to NSAIDS, cortisone injections, viscosupplementation, PT, and pain medicine.  Due to the current findings and affected activities of daily living, surgical intervention is indicated.  The alternatives, risks, complications, as well as expected outcome were discussed.  These include but are not limited to infection, blood loss, need for blood transfusion, neurovascular damage, DVT, PE,  post-op stiffness and pain, leg length discrepancy, dislocation, anesthetic complications, prothesis longevity, need for more surgery, MI, stroke, and even death.  The patient understands and wishes to proceed with surgery.      Past Medical History:   Diagnosis Date    Arthritis     Asthma     Bipolar disorder (HCC)     GERD (gastroesophageal reflux disease)     [place] (hard of hearing)     Hearing aid right ear . Deaf in left ear    Obesity (BMI 30.0-34.9) 3/1/2017    PTSD (post-traumatic stress disorder)     Sleep apnea     resolved with weight loss         Current Outpatient Medications:     ibuprofen (ADVIL;MOTRIN) 600 MG tablet, Take 1 tablet by mouth 4 times daily as needed for Pain (Patient not taking: Reported on 1/5/2024), Disp: 40 tablet, Rfl: 0    magnesium oxide (MAG-OX) 400 (240     Subjective      REASONS FOR FOLLOWUP:   T2N1M0, ER/CO negative, HER2 positive breast cancer.                   2.. MGUS Recurrent syncope workup found incidental IgG kappa                              monoclonal  gammopathy  Workup negative negative         History of Present Illness patient is a 56-year-old lady with a node positive HER-2 positive ER negative breast cancer 11 years from completion of adjuvant chemotherapy.  And MGUS 5 years from diagnosis with little progression     She comes in today for follow-up and overall she is doing well but   She also has an incidentally found IgG MGUS which we are following and appears to gradually increasing both her M protein and free light chain ratio .her M protein is 1.3 g and we will check a little more closely at 3-month intervals for now  She had a hip replaced and is doing well from that    She had some  pain in her left neck with radicular type symptoms into her left arm and I have asked for a C-spine MRI to evaluate this but this is almost certainly benign and degenerative-C-spine MRI does show significant degenerative disease and physical therapy has helped her left shoulder discomfort    She has had another syncopal episode and wonders if this could be from a brain issue w her last brain MRI was in 2018 and I have ordered an MRI to evaluate this    She is complaining of aching in her muscles and bones and joints and feeling better overall and we will check an ARTEMIO and rheumatoid factor too.    Her CBC stable except for mild anemia and she has had this in the past and we will keep an eye on it and recheck anemia labs but I do not think this is related to her paraprotein    She has had some episodes of syncope which go back to 10 years ago more recently she has had 3 episodes in 3 months and is very concerned.  The cardiologist ruled out cardiac issues which has never had a tilt table test.  Neurologist have seen her but have no suggestions.  She had an MRI of  the brain and  and then again in 2018 which was negative    Active Ambulatory Problems     Diagnosis Date Noted    Bilateral malignant neoplasm of upper outer quadrant of breast in female 04/15/2016    Chronic pain of left knee 2018    Acute pain of left knee 2018    Contusion of left knee 2018    Arthritis of left knee 2018    Chondromalacia, patella, left 2018    MGUS (monoclonal gammopathy of unknown significance) 2018    Arthritis of left hip 2019    Left hip pain 2019    Status post administration of cardiotoxic chemotherapy 2021    Precordial pain 2021     Resolved Ambulatory Problems     Diagnosis Date Noted    No Resolved Ambulatory Problems     Past Medical History:   Diagnosis Date    Acquired hypothyroidism     Anemia     Arthritis     Asthma     Cancer     Chest pain     H/O Chondromalacia patellae, left     H/O Regurgitation, mitral/triscupid, mild     H/O Syncope w/collapse     Heart murmur     History of chronic fatigue     Hx of bladder infections     Lymphedema     Migraines     MVP (mitral valve prolapse)     Pneumonia      SURGICAL HISTORY: Fibroadenoma from the left breast in . Uterine fibroids removed laparoscopically in . Left knee arthroscopy in  and hysterectomy in . Ovaries were left in place.     GYN HISTORY: Menarche age 13.  0. She obviously stopped menstruating at the time of her hysterectomy in  and is having hot flashes.     HEMATOLOGIC/ONCOLOGIC HISTORY:    The  patient initially seen on 10/18/2013, a 48-year-old  female who works as a  at the [employer] [place] who has a long history of fibrocystic breast disease with multiple ultrasounds and mammograms in the past to evaluate c ysts but after her most recent mammogram, because of some abnormalities, an ultrasound was recommended on 2013 at [facility]. The targeted ultrasound showed 3  circumscribed solid masses in the left breast at the 2 o' clock position, axillar y tail of the breast. The appearance was suggestive of intramammary lymph nodes without a definite fatty hilum which was worrisome for tumor involvement. One of the 3 masses had actually increased in size compared to 2 of the other lesions which were un changed from 2010 suggesting benign etiology. Because of the greater than 20% interval increase in size of one of these nodules since the last mammogram, ultrasound guided biopsy was recommended.   Ultrasound-guided biopsy was done on 08/01/2013 and this biopsy showed findings highly suspicious for lymph node involvement by ductal carcinoma and the patient then went to see Dr. Witt who sent her for another biopsy on 08/19/2013 and a 1.5 cm mass was seen at the 3 o' clock position of the left breast in luan tion to the 3 abnormal axillary masses which were felt to be lymph nodes. This area was biopsied on 08/19/2013 and the path report showed invasive ductal carcinoma and DCIS grade 3 with the largest focus of invasive cancer being 1 cm and DCIS was interme d iate to high grade. The left axillary lymph node was core biopsied and confirmed metastatic carcinoma. ER/LA were negative, HER2 was 3+ positive. This led to an MRI of the breast 08/27/2013 which showed 3 cm mass in the lower outer quadrant of the left breast and additional clump enhancement in the middle and anterior third of the left breast at the 3 o' clock axis suspicious for in situ carcinoma and 3 rounded masses measured up to 9 mm in greatest dimension are noted consistent with lymph nodes. One ha d a clip from the recent biopsy. Right breast showed stippled foci of variable enhancement with no dominant or clumped findings, prominent right axillary lymph nodes were noted and right axillary lymph node biopsy was done. This was nondiagnostic.   The patient underwent bone scan on 09/09/2013 which was unremarkable except for  some increased activity at T9 and the right part of L3 which was again felt to be nonspecific but plain films were recommended. CT scan of the chest, abdomen and pelvis was done which was also unremarkable except for prominent intramammary nodes in the upper outer left breast and several prominent axillary lymph nodes bilaterally one of which has been biopsied on the right. Appearance raised the concern of possible metastatic to both axillae but there were no other sites of involvement. Bone density dated 09/20/2013 was normal. Patient then went for surgery on 09/25/2013 at which time she underwent right mastectomy which was unremarkable and left total mastectomy with axillary dissection which showed a 2.9 cm grade 3 infiltrating ductal carcinoma with associated DCIS, margins were uninvolved. Three of 21 lymph nodes showed metastatic disease making this a pT2N1.   The patient has done well from surgery but has had a lot of prob lems with anxiety and hot flashes but otherwise has done well. She went to see Dr. Cedeno for recommendations and then came to see us for further discussion of treatment options. In addition, the patient had BRCA testing which thankfully was negative.   T he patient had a MUGA scan, which showed an ejection fraction of 50% and no other abnormalities. Her plain films of the abnormal spots on her bone scan were negative. She had port placed and is ready to start treatment. The patient was presented at the [facility] board and the right axillary lymph nodes were felt to be insignificant and plans for dose-dense AC/Taxol followed by Herceptin for a year and radiation to be considered because of the carroll disease.   When she had CT scans of the chest, abdomen and pelvis, they were unremarkable except for hemangioma in the liver and prominent intramammary nodes in the upper outer quadrant of the left breast with several prominent axillary nodes bilaterally.   The patient started dose-dense AC with  fairly good tolerance except for some mucositis after cycle 4. She had a febrile illness that required hospitalization from 01/02/2014 - 01/15/2014 with a fever of unknown origin. Multiple viral cultures were negative including CMV, EBV, herpes, respiratory panel and hepatiti s profile was negative. Mouth multiple blood and urine cultures and throat cultures were negative except for Haemophilus parainfluenza for which a course of amoxicillin was given. During the hospitalization the patient spiked fevers continuously and then developed rapid elevation of her liver tests with a ferritin which went up as high as 44,000. The patient defervesced finally and the liver profile started coming down. Echo with bubble echo during her stay was normal with ejection fraction of 60%. The p atchrissy was discharged home with plans to resume chemotherapy cautiously once her numbers stabilized.   The patient was seen on 01/22/2014 feeling much better. She still is not very active, feeling fatigued and deconditioned from lying in bed for almost 2 we eks. The decision was made because of borderline blood pressure of 94/70 and some mild tachycardia to have her be more active and exercise for another week and reevaluate to start Herceptin on 01/30/2014 with Taxol to be added after 2 doses of Herceptin i f she is tolerating the treatment well and her liver functions are normal. A bone marrow biopsy was also done during her hospital stay and no signs of hemophagocytosis or dysplasia although there was some aberrant myeloid antigen of uncertain significanc e which has been seen with myelodysplasia. She was also seen briefly by Dr. Fatmata Tolbert of Rheumatology while in the hospital for low positive ARTEMIO of 1:160 and anti-SSA antibody greater than 8 but Dr. Tolbert felt this was unlikely to be rheumatologica l disease because 4 doses of Cytoxan should not have precipitated this.   The patient initiated weekly Herceptin for 2 weeks with no  significant problems. Taxol weekly initiated on 02/13/2014 with close followup of LFTs and cardiac function.   The patient was seen on 03/06/2014 with echo showing EF of 63%. We will continue Taxol and Herceptin weekly and watch her LFTs closely. Neurontin causes dizziness and she is not taking it. We will hold off on Effexor for the time being.   Patient required Neupogen for 3 doses because of an ANC of 1.06 with dose 4 and Neupogen x3 is added for each dose.   The patient is seen 03/20/2014 doing fairly well. Taxol dose decreased to 15% because of neuropathy. Neupogen continued.   The patient was seen on 05/23/2014 doing well. Herceptin continued with plans to check an echocardiogram before her next treatment in 3 weeks.   MRI of the liver confirmed hemangioma. Echocardiogram is stable at 59%. Herceptin continued at 3-week intervals with the addition of Pepcid and Solu-Cortef 50 mg and the patient is almost certainly going to agree to radiation for her 3 node status, especially since the pathologist at [facility] thought there was extranodal extension.   The patient was seen 07/25/2014. Echocardiogram results noted, slightly reduced from previous, the patient proceeding through radiation therapy of left chest wall. Followup echocardiogram scheduled after her Herceptin therapy 07/25/2014.   Patient was seen on 08/14/2014 with EF of 53%. I discussed th e case with Dr. Bebe Fernandez and did not use the definitive contrast and this may explain the differences, but based on the fact that she had some near syncope on one occasion this weekend and the fact that she has had radiation to the left chest area with a significant skin burn, we will hold Herceptin today and bring her back in 4 weeks, but check her LFTs and ferritin again today.   The patient was 11/14/2014 doing well with ejection fraction of 63% and echocardiogram was reviewed. Herceptin will be continued until mid-January.   The patient is seen  12/29/2014. She had a syncopal episode 12/19/2014. ER workup was negative and neurological evaluation was ordered. Bone scan ordered for right hip pain. If this is negative she will have her port removed in J anuary and she is planning to delay reconstruction for a year.   The patient was evaluated for some pain in her back with a bone scan which was essentially unremarkable except for some degenerative changes at L3, and T6-7-8. Because we were concerned, we ordered an MRI of the thoracic and lumbar spine which was benign with just DJD. No further followup is needed at this time with regard to scans.   Patient seen on 05/08/2015, doing well; occasional migraine-type headache persists. Port has been removed. She is thinking about reconstruction sometime at the end of the year.   CT of the abdomen done at [facility] in 10/2014 shows a liver lesion, which was indeterminate. They recommended MRI. We have compared to a previous MRI in 2014 and established this is hemangioma.   Patient seen on 12/18/2015 having had CT scans of the chest and abdo men at [facility] for some abdominal discomfort that showed a 3 cm lesion in the dome of the liver, which they were worried about and recommended an MRI, but we reviewed the scans and also compared to the previous MRI of the liver d one in 2014 in October and confirmed that this indeed was a hemangioma that has been stable since 2013. An echo was also done which showed an ejection fraction of 60% and stable.   7/17   patient is a 51-year-old  female with an ER/WV negative HER-2 positive breast cancer node positive T2 N1 M0 treated with Adriamycin and Cytoxan followed by Taxol Herceptin was completed in late 2014 .  He is here for routine follow-up and overall she is doing well she was diagnosed with asthma and  is on an inhaler with good improvement.  She has mild hot flashes but has mood swings and menopausal symptoms  as reported into menopause with chemotherapy.  She has no unusual aches or pains but otherwise is doing well and working full-time.  She is not interested in reconstruction at this point.  We did discuss doing extended genetic testing because of her family history we referred her for the extended testing but she has not done that yet She did have a screening colonoscopy and this was negative.  DEXA scan was normal in April of this year.  She is still very anxious about recurrence which is understandable but getting better with time.  7/18  0.9 g IgG kappa MGUS noted the negative skeletal survey and urine protein.  Bone marrow planned workup for recurrent syncope underway per cardiology    10/18  having intermittent episodes of syncope which sounds vasovagal the last one was in May when she was at a bar drinking with friends .    she's had an echo and a stress test and an EEG andbrain MRI  which was reportedly benign.  She is currently wearing a event monitor During the workup of these syncopal episodes the neurologist ordered an immunoelectrophoresis which showed an IgG paraprotein of 0.8 g/dL.  Need to evaluate this further but I don't think this is anything to do with her syncope unless she has an autonomic neuropathy but she was sitting down at the time she felt nausea  and had her syncopal episode that does not appear to be orthostatic  We reviewed her skeletal survey which is essentially negative her urine did not show any significant Bence Witt protein she has a 0.9 g/dL M protein with a mildly elevated light chain ratio and a mildly elevated beta-2 microglobulin of 2.5      I recommended a bone marrow biopsy and staining for amyloid and this was done and her bone marrow showed 5% plasma cells which are monotypic and have a 13q minus but no amyloid deposition-I explained that she has an MGUS but can proceed to myeloma over time especially because she has an abnormal free light chain ratio and we will watch  "her M protein closely  I'm not sure this explains her syncope        SOCIAL HISTORY: Single. . She does not smoke. Drinks very little. No drug history.     FAMILY HISTORY: Both of her parents are in their 60s. Mother had kidney cancer and had a partial kidney resection. Brother  of head trauma, another is healthy. She has two sisters who are healthy. She has 2 paternal aunts with breast cancer; one is alive in her 70s, the other  at 48 of breast cancer. A paternal uncle with kidney cancer. Paternal grandfather with colon cancer in his 60s. Multiple paternal cousins with cancer inc luding leukemia and kidney cancer. No other history of breast or ovarian cancer in the family.     Review of Systems   Constitutional:  Positive for fatigue.   Respiratory:  Negative for chest tightness.    Gastrointestinal:  Negative for constipation, diarrhea, nausea and vomiting.   Musculoskeletal:  Positive for myalgias and neck pain. Negative for arthralgias and back pain (same 2020).   Skin:  Negative for rash.   Allergic/Immunologic: Negative for environmental allergies.   Neurological:  Negative for numbness (Chronic from chemo).   Psychiatric/Behavioral:  Nervous/anxious: little better 2020.       A comprehensive 14 point review of systems was performed and was negative except as mentioned.    Medications:  The current medication list was reviewed in the EMR    ALLERGIES:    Allergies   Allergen Reactions    Ciprofloxacin Hives    Sulfa Antibiotics Hives    Penicillins Nausea And Vomiting    Amoxicillin Nausea And Vomiting       Objective      Vitals:    24 1117   BP: 160/88   Pulse: 84   Resp: 18   Temp: 98.1 °F (36.7 °C)   TempSrc: Temporal   SpO2: 97%   Weight: 70 kg (154 lb 4.8 oz)   Height: 160 cm (62.99\")   PainSc: 0-No pain           3/28/2024    11:04 AM   Current Status   ECOG score 0       Physical Exam    GENERAL:  Well-developed, well-nourished in no acute distress.   SKIN:  Warm, " dry without rashes, purpura or petechiae.   HEAD:  Normocephalic.  EYES:  Pupils equal, round and reactive to light.  EOMs intact.  Conjunctivae normal.  EARS:  Hearing intact.  NOSE:  Septum midline.  No excoriations or nasal discharge.  MOUTH:  Tongue is well-papillated; no stomatitis or ulcers.  Lips normal.  THROAT:  Oropharynx without lesions or exudates.  NECK:  Supple with good range of motion; no thyromegaly or masses, no JVD.  LYMPHATICS:  No cervical, supraclavicular, axillary or inguinal adenopathy.  CHEST:  Lungs clear to percussion and auscultation. Good airflow.  BREASTS: Bilateral chest wall is benign with no axillary adenopathy-  CARDIAC:  Regular rate and rhythm without murmurs, rubs or gallops. Normal S1,S2.  ABDOMEN:  Soft, nontender with no organomegaly or masses.  EXTREMITIES:  No clubbing, cyanosis or edema.  Mild lymphedema left forearm  NEUROLOGICAL:  Cranial Nerves II-XII grossly intact.  No focal neurological deficits.  PSYCHIATRIC:  Normal affect and mood.    I have reexamined the patient and the results are consistent with the previously documented exam. Jorge Stinson MD       RECENT LABS:  Lab Results   Component Value Date    WBC 4.02 03/21/2024    HGB 10.9 (L) 03/21/2024    HCT 31.8 (L) 03/21/2024    MCV 90.6 03/21/2024     03/21/2024     Lab Results   Component Value Date    GLUCOSE 90 03/21/2024    BUN 15 03/21/2024    CREATININE 1.04 (H) 03/21/2024    EGFR 62.0 03/21/2024    BCR 14.4 03/21/2024    K 4.1 03/21/2024    CO2 24.6 03/21/2024    CALCIUM 9.1 03/21/2024    PROTENTOTREF 7.1 03/21/2024    ALBUMIN 4.0 03/21/2024    ALBUMIN 3.8 03/21/2024    LABIL2 1.2 03/21/2024    AST 22 03/21/2024    ALT 12 03/21/2024         Assessment & Plan   1.  T2 N1 ER/MI negative HER-2 positive left breast cancer 2013 treated with Adriamycin Cytoxan Taxol Herceptin and bilateral mastectomies plus radiation 9 Years from diagnosis  2.  BRCA negative VUS ABRAXAS1  3.  Hemangioma of the  liver  4.  family history of malignancy  5.  Mild chronic anemia iron normal -scope 8/19 neg  Anemia slightly worse at 10.9 in/ 3/24  6.  Anxiety intolerant of Effexor ?  Zyprexa 2.5 mg   7. Recurrent syncope without obvious precipitating cause workup underway  8.  Incidental finding of a monoclonal gammopathy IgG kappa skeletal survey negative bone marrow 8/18  showing 5% plasma cells with no amyloid  M protein 1.1 g in 8/22 with abnormal light chain ratio which is stable  M protein up to 1.3 in 2/23 with increasing light chain ratio abnormality closer follow-up planned  Protein stable 1.2 and 8/23 5 years from diagnosis  Protein stable at 1.3 in 3/24 stable of 6 years    9.  Left neck pain and radicular symptoms?  Degenerative disease          C-spine DJD refer to neurosurgery    Plan  1.return for myeloma parameters in 3 months  2.  see me in 6months   3.  MRI brain  4.  Follow-up with neurology  5.  Anemia labs rheumatoid factor ARTEMIO and sed rate-May need referral to rheumatology if these are abnormal    I spent 37 total minutes, face-to-face, caring for Santos today. Greater than 50% of this time involved counseling and/or coordination of care as documented within this note.      3/28/2024      CC:

## 2024-03-28 NOTE — TELEPHONE ENCOUNTER
"  Caller: Santos Witt \"KARSON\"    Relationship: Self    Best call back number: [phone]    What is the best time to reach you: ANYTIME    Who are you requesting to speak with (clinical staff, provider,  specific staff member): CLINICAL    What was the call regarding: PT IS WANTING TO SEE IF DR GARCIA WILL SEND IN A REFERRAL FOR A MRI FOR THE ENTIRE UPPER BODY RATHER THEN JUST THE BRAIN  PLEASE CALL TO ADVISE      "

## 2024-03-29 LAB
CENTROMERE B AB SER-ACNC: <0.2 AI (ref 0–0.9)
CHROMATIN AB SERPL-ACNC: 1.8 AI (ref 0–0.9)
DSDNA AB SER-ACNC: <1 IU/ML (ref 0–9)
ENA JO1 AB SER-ACNC: <0.2 AI (ref 0–0.9)
ENA RNP AB SER-ACNC: <0.2 AI (ref 0–0.9)
ENA SCL70 AB SER-ACNC: <0.2 AI (ref 0–0.9)
ENA SM AB SER-ACNC: <0.2 AI (ref 0–0.9)
ENA SS-A AB SER-ACNC: >8 AI (ref 0–0.9)
ENA SS-B AB SER-ACNC: <0.2 AI (ref 0–0.9)
Lab: ABNORMAL

## 2024-04-02 ENCOUNTER — TELEPHONE (OUTPATIENT)
Dept: ONCOLOGY | Facility: CLINIC | Age: 59
End: 2024-04-02
Payer: COMMERCIAL

## 2024-04-02 NOTE — TELEPHONE ENCOUNTER
Dr Stinson reviewed labs and would like to refer pt to rheumatology do to ARTEMIO results. Referral placed to Dr. Wagner and called pt to discuss. Left voicemail with my direct number to discuss. If Dr Wagner is not taking new referrals or if pt prefers alternate provider (appears she has quite a few providers in the [facility] system) we can certainly amend the referral to another provider if she requests.

## 2024-04-10 ENCOUNTER — TELEPHONE (OUTPATIENT)
Dept: ONCOLOGY | Facility: CLINIC | Age: 59
End: 2024-04-10

## 2024-04-25 DIAGNOSIS — C50.412 MALIGNANT NEOPLASM OF UPPER-OUTER QUADRANT OF BOTH BREASTS IN FEMALE, ESTROGEN RECEPTOR POSITIVE: Primary | ICD-10-CM

## 2024-04-25 DIAGNOSIS — Z17.0 MALIGNANT NEOPLASM OF UPPER-OUTER QUADRANT OF BOTH BREASTS IN FEMALE, ESTROGEN RECEPTOR POSITIVE: Primary | ICD-10-CM

## 2024-04-25 DIAGNOSIS — C50.411 MALIGNANT NEOPLASM OF UPPER-OUTER QUADRANT OF BOTH BREASTS IN FEMALE, ESTROGEN RECEPTOR POSITIVE: Primary | ICD-10-CM

## 2024-04-25 DIAGNOSIS — D47.2 MGUS (MONOCLONAL GAMMOPATHY OF UNKNOWN SIGNIFICANCE): ICD-10-CM

## 2024-04-25 NOTE — PROGRESS NOTES
Thoracic spine MRI denied by insurance. Order placed for bone scan per DR. Stinson. Message sent to scheduling to arrange.

## 2024-04-26 ENCOUNTER — TELEPHONE (OUTPATIENT)
Dept: ONCOLOGY | Facility: CLINIC | Age: 59
End: 2024-04-26
Payer: COMMERCIAL

## 2024-04-26 NOTE — TELEPHONE ENCOUNTER
----- Message from Bridget ANTUNEZ sent at 4/24/2024  2:02 PM EDT -----  Can you get this patient added back on to the schedule for her MRI Brain? Pt called in and cancelled, because of insurance. We were able to get the MRI Brain approved by insurance. MRI Thoracic spine was denied by insurance and per Jennifer we can just proceed with the MRI Brain.  ----- Message -----  From: Jennifer Rodríguez RN  Sent: 4/24/2024   1:51 PM EDT  To: Jorge Stinson MD; #    Bone scan would not assess for pinched nerve which is one of the things she was assessing.    I guess just proceed with MRI brain for sure  ----- Message -----  From: Ciara Hancock  Sent: 4/24/2024   1:46 PM EDT  To: Jorge Stinson MD; Jennifer Rodríguez RN; #    MRI Brain was approved after peer to peer.    MRI Thoracic spine was denied after peer to peer. Per ins, bone scan would be approved in place of.    Just let me know what you would like to do. Patient called in and cancelled both of these scans, so I will work with scheduling to get her added back on once you let me know what you want to do moving forward for the MRI Thoracic spine.

## 2024-04-26 NOTE — TELEPHONE ENCOUNTER
I called and left a voicemail for patient to call Dr. Oz Alvarez's office at [facility]. [phone].

## 2024-04-29 ENCOUNTER — TELEPHONE (OUTPATIENT)
Dept: ONCOLOGY | Facility: CLINIC | Age: 59
End: 2024-04-29
Payer: COMMERCIAL

## 2024-04-29 NOTE — TELEPHONE ENCOUNTER
----- Message from Bridget ANTUNEZ sent at 4/29/2024 12:28 PM EDT -----  Regarding: reschedule brain MRI  Can you call her to get her MRI rescheduled? She said she has a conflict that day. Thanks

## 2024-05-15 ENCOUNTER — HOSPITAL ENCOUNTER (OUTPATIENT)
Dept: MRI IMAGING | Facility: HOSPITAL | Age: 59
Discharge: HOME | End: 2024-05-15
Payer: COMMERCIAL

## 2024-05-15 ENCOUNTER — APPOINTMENT (OUTPATIENT)
Dept: OTHER | Facility: HOSPITAL | Age: 59
End: 2024-05-15
Payer: COMMERCIAL

## 2024-05-15 DIAGNOSIS — D47.2 MGUS (MONOCLONAL GAMMOPATHY OF UNKNOWN SIGNIFICANCE): ICD-10-CM

## 2024-05-15 LAB — CREAT BLDA-MCNC: 0.7 MG/DL (ref 0.6–1.3)

## 2024-05-15 PROCEDURE — 82565 ASSAY OF CREATININE: CPT

## 2024-05-15 PROCEDURE — A9577 INJ MULTIHANCE: HCPCS | Performed by: INTERNAL MEDICINE

## 2024-05-15 PROCEDURE — 70553 MRI BRAIN STEM W/O & W/DYE: CPT

## 2024-05-15 PROCEDURE — 0 GADOBENATE DIMEGLUMINE 529 MG/ML SOLUTION: Performed by: INTERNAL MEDICINE

## 2024-05-15 RX ADMIN — GADOBENATE DIMEGLUMINE 14 ML: 529 INJECTION, SOLUTION INTRAVENOUS at 09:22

## 2024-05-17 ENCOUNTER — HOSPITAL ENCOUNTER (OUTPATIENT)
Dept: NUCLEAR MEDICINE | Facility: HOSPITAL | Age: 59
Discharge: HOME | End: 2024-05-17
Payer: COMMERCIAL

## 2024-05-17 DIAGNOSIS — D47.2 MGUS (MONOCLONAL GAMMOPATHY OF UNKNOWN SIGNIFICANCE): ICD-10-CM

## 2024-05-17 DIAGNOSIS — C50.411 MALIGNANT NEOPLASM OF UPPER-OUTER QUADRANT OF BOTH BREASTS IN FEMALE, ESTROGEN RECEPTOR POSITIVE: ICD-10-CM

## 2024-05-17 DIAGNOSIS — C50.412 MALIGNANT NEOPLASM OF UPPER-OUTER QUADRANT OF BOTH BREASTS IN FEMALE, ESTROGEN RECEPTOR POSITIVE: ICD-10-CM

## 2024-05-17 DIAGNOSIS — Z17.0 MALIGNANT NEOPLASM OF UPPER-OUTER QUADRANT OF BOTH BREASTS IN FEMALE, ESTROGEN RECEPTOR POSITIVE: ICD-10-CM

## 2024-05-17 PROCEDURE — A9503 TC99M MEDRONATE: HCPCS | Performed by: INTERNAL MEDICINE

## 2024-05-17 PROCEDURE — 0 TECHNETIUM MEDRONATE KIT: Performed by: INTERNAL MEDICINE

## 2024-05-17 PROCEDURE — 78306 BONE IMAGING WHOLE BODY: CPT

## 2024-05-17 RX ORDER — TC 99M MEDRONATE 20 MG/10ML
20 INJECTION, POWDER, LYOPHILIZED, FOR SOLUTION INTRAVENOUS
Status: COMPLETED | OUTPATIENT
Start: 2024-05-17 | End: 2024-05-17

## 2024-05-17 RX ADMIN — Medication 20 MILLICURIE: at 07:53

## 2024-05-20 ENCOUNTER — TELEPHONE (OUTPATIENT)
Dept: ONCOLOGY | Facility: CLINIC | Age: 59
End: 2024-05-20
Payer: COMMERCIAL

## 2024-05-20 DIAGNOSIS — R90.89 ABNORMAL FINDING ON MRI OF BRAIN: Primary | ICD-10-CM

## 2024-05-20 NOTE — TELEPHONE ENCOUNTER
Called pt and left voicemail to return my call to review Dr Stinson's recommendation re: her MRI results. Left voicemail to return my call to my direct number.

## 2024-05-28 ENCOUNTER — TELEPHONE (OUTPATIENT)
Dept: ONCOLOGY | Facility: CLINIC | Age: 59
End: 2024-05-28
Payer: COMMERCIAL

## 2024-05-28 NOTE — TELEPHONE ENCOUNTER
Returned pt's call and reviewed the results of her MRI which Dr. Stinson was referring her to Dr. Suh for further imaging recommendations. She v/u of this plan and will keep scheduled appt with Dr. Banks.

## 2024-05-28 NOTE — TELEPHONE ENCOUNTER
"  Caller: Santos Witt \"KARSON\"    Relationship: Self    Best call back number: [phone]    What test was performed: MRI AND BONE SCANS    When was the test performed: 05/15 AND 05/17    Where was the test performed:     Additional notes: SHE IS NEEDING MORE CLARIFICATION REGARDING HER SEEING HER DENTIST AND THE DR. WILLOUGHBY REFERRAL..      "

## 2024-06-06 NOTE — PROGRESS NOTES
Patient ID: Santos Witt is a 59 y.o. female is here today for follow-up for abnormal imaging.    Imaging: MRI of the brain performed on 05/15/2024    Subjective     The patient is here in regards to   Chief Complaint   Patient presents with    Tingling    Follow-up       History of Present Illness  Paulina is here for evaluation of recent MRI of the brain imaging which indicates some mild tonsillar ectopia.  She denies any tussive headaches although she does have vasovagal symptoms every now and then with alternating high and low blood pressure.      While in the room and during my examination of the patient I wore a mask and eye protection.  I washed my hands before and after this patient encounter.  The patient was also wearing a mask.    The following portions of the patient's history were reviewed and updated as appropriate: allergies, current medications, past family history, past medical history, past social history, past surgical history and problem list.    Review of Systems   HENT:  Positive for tinnitus.    Eyes:  Positive for visual disturbance.   Gastrointestinal:  Negative for nausea and vomiting.   Genitourinary:  Negative for difficulty urinating.   Neurological:  Positive for syncope, weakness, light-headedness, numbness (left side) and headaches. Negative for dizziness.   Psychiatric/Behavioral:  Positive for confusion and decreased concentration.         Past Medical History:   Diagnosis Date    Acquired hypothyroidism     Anemia     History of anemia    Arthritis     Knees    Asthma     Cancer 2013    Left breast cancer, T2NM0    Chest pain     H/O Chondromalacia patellae, left     H/O Regurgitation, mitral/triscupid, mild     H/O Syncope w/collapse     Heart murmur     Childhood    History of chronic fatigue     Hx of bladder infections     Lymphedema     Migraines     MVP (mitral valve prolapse)     Pneumonia        Allergies   Allergen Reactions    Ciprofloxacin Hives    Sulfa Antibiotics  Hives    Penicillins Nausea And Vomiting    Amoxicillin Nausea And Vomiting       Family History   Problem Relation Age of Onset    Kidney cancer Mother         Treated surgically    Heart disease Mother     Hypertension Mother     Thyroid disease Mother     Lung cancer Mother     Heart attack Mother     Hypertension Father     Heart disease Father     Heart attack Father     Thyroid disease Sister     No Known Problems Brother     Breast cancer Paternal Aunt     Heart disease Paternal Aunt     Kidney cancer Paternal Uncle     Colon cancer Paternal Grandfather         In his 60s    Breast cancer Paternal Aunt     Diabetes Maternal Aunt     Pancreatic cancer Other     Heart disease Maternal Grandmother     Heart attack Maternal Grandmother     Heart disease Paternal Grandmother     Heart attack Paternal Grandmother        Social History     Socioeconomic History    Marital status: Single    Number of children: 0    Years of education: High school   Tobacco Use    Smoking status: Never    Smokeless tobacco: Never   Vaping Use    Vaping status: Never Used   Substance and Sexual Activity    Alcohol use: Yes     Comment: Very little    Drug use: No    Sexual activity: Defer       Past Surgical History:   Procedure Laterality Date    BREAST BIOPSY Left 08/2013    x2     BREAST CYST EXCISION Left 1982    Fibroid cyst left nipple    BREAST SURGERY Bilateral 09/2013    Mastectomy    HYSTERECTOMY  06/2011    Partial    KNEE ARTHROSCOPY Left 2006    PELVIC LAPAROSCOPY  2002    cysts removed    TOTAL HIP ARTHROPLASTY Left 2019         Objective     Vitals:    06/07/24 1043   BP: 130/78   Pulse: 85   Resp: 16   Temp: 97.7 °F (36.5 °C)   SpO2: 98%     Body mass index is 27.46 kg/m².    Physical Exam  Constitutional:       Appearance: Normal appearance.   HENT:      Head: Normocephalic and atraumatic.   Eyes:      Extraocular Movements: Extraocular movements intact.      Conjunctiva/sclera: Conjunctivae normal.      Pupils: Pupils  are equal, round, and reactive to light.   Cardiovascular:      Rate and Rhythm: Normal rate and regular rhythm.      Pulses: Normal pulses.   Pulmonary:      Breath sounds: Normal breath sounds.   Abdominal:      Palpations: Abdomen is soft.   Musculoskeletal:         General: Normal range of motion.      Cervical back: Normal range of motion and neck supple.   Skin:     General: Skin is warm and dry.   Neurological:      Mental Status: She is alert and oriented to person, place, and time.      Cranial Nerves: Cranial nerves 2-12 are intact.      Motor: Motor function is intact. No weakness or atrophy.      Coordination: Coordination is intact. Romberg sign negative. Romberg Test normal.      Gait: Gait is intact. Gait normal.      Deep Tendon Reflexes: Reflexes are normal and symmetric.      Reflex Scores:       Tricep reflexes are 2+ on the right side and 2+ on the left side.       Bicep reflexes are 2+ on the right side and 2+ on the left side.       Brachioradialis reflexes are 2+ on the right side and 2+ on the left side.       Patellar reflexes are 2+ on the right side and 2+ on the left side.       Achilles reflexes are 2+ on the right side and 2+ on the left side.  Psychiatric:         Speech: Speech normal.         Neurologic Exam     Mental Status   Oriented to person, place, and time.   Attention: normal. Concentration: normal.   Speech: speech is normal   Level of consciousness: alert    Cranial Nerves   Cranial nerves II through XII intact.     CN III, IV, VI   Pupils are equal, round, and reactive to light.    Motor Exam   Muscle bulk: normal  Overall muscle tone: normal    Strength   Strength 5/5 except as noted.     Sensory Exam   Light touch normal.     Gait, Coordination, and Reflexes     Gait  Gait: normal    Coordination   Romberg: negative    Reflexes   Reflexes 2+ except as noted.   Right brachioradialis: 2+  Left brachioradialis: 2+  Right biceps: 2+  Left biceps: 2+  Right triceps: 2+  Left  triceps: 2+  Right patellar: 2+  Left patellar: 2+  Right achilles: 2+  Left achilles: 2+      Assessment & Plan   Independent Review of Radiographic Studies:      I personally reviewed the images from the following studies.    MR: MRI of the brain w/wo contrast was reviewed and shows remote cerebellar infarct, unlikely to be clinically significant.  There is also 3 mm of tonsillar ectopia without significant crowding of the obex and normal dimensions of the fourth ventricle and foramen of Magendie    Assessment/Plan: She does technically have tonsillar ectopia but I do not think that it is clinically significant.  The majority of her symptoms in terms of her arm and shoulder pain are likely from her cervical stenosis and I think she will benefit from epidural steroid injection.    Medical Decision Making:      Epidural steroid injection         Diagnoses and all orders for this visit:    1. Cervical radiculopathy (Primary)  -     Epidural Block    2. Chiari I malformation             Patient Instructions/Recommendations:    Follow-up in 3 months      Roby Rodriguez MD  06/07/24  11:10 EDT

## 2024-06-07 ENCOUNTER — OFFICE VISIT (OUTPATIENT)
Dept: NEUROSURGERY | Facility: CLINIC | Age: 59
End: 2024-06-07
Payer: COMMERCIAL

## 2024-06-07 VITALS
BODY MASS INDEX: 27.46 KG/M2 | HEIGHT: 63 IN | SYSTOLIC BLOOD PRESSURE: 130 MMHG | DIASTOLIC BLOOD PRESSURE: 78 MMHG | HEART RATE: 85 BPM | WEIGHT: 155 LBS | RESPIRATION RATE: 16 BRPM | OXYGEN SATURATION: 98 % | TEMPERATURE: 97.7 F

## 2024-06-07 DIAGNOSIS — G93.5 CHIARI I MALFORMATION: ICD-10-CM

## 2024-06-07 DIAGNOSIS — M54.12 CERVICAL RADICULOPATHY: Primary | ICD-10-CM

## 2024-06-11 ENCOUNTER — TELEPHONE (OUTPATIENT)
Dept: NEUROSURGERY | Facility: CLINIC | Age: 59
End: 2024-06-11
Payer: COMMERCIAL

## 2024-06-11 NOTE — TELEPHONE ENCOUNTER
Santos called and stated her left thumb is locking up on her and she was wanting to know if the cervical epidural will help with her thumb locking up.    Please advise and thank you

## 2024-06-20 ENCOUNTER — LAB (OUTPATIENT)
Dept: LAB | Facility: HOSPITAL | Age: 59
End: 2024-06-20
Payer: COMMERCIAL

## 2024-06-20 DIAGNOSIS — D47.2 MGUS (MONOCLONAL GAMMOPATHY OF UNKNOWN SIGNIFICANCE): ICD-10-CM

## 2024-06-20 LAB
BASOPHILS # BLD AUTO: 0.01 10*3/MM3 (ref 0–0.2)
BASOPHILS NFR BLD AUTO: 0.3 % (ref 0–1.5)
DEPRECATED RDW RBC AUTO: 45.1 FL (ref 37–54)
EOSINOPHIL # BLD AUTO: 0.03 10*3/MM3 (ref 0–0.4)
EOSINOPHIL NFR BLD AUTO: 1 % (ref 0.3–6.2)
ERYTHROCYTE [DISTWIDTH] IN BLOOD BY AUTOMATED COUNT: 13.7 % (ref 12.3–15.4)
HCT VFR BLD AUTO: 34.3 % (ref 34–46.6)
HGB BLD-MCNC: 11.4 G/DL (ref 12–15.9)
IMM GRANULOCYTES # BLD AUTO: 0 10*3/MM3 (ref 0–0.05)
IMM GRANULOCYTES NFR BLD AUTO: 0 % (ref 0–0.5)
LYMPHOCYTES # BLD AUTO: 1.58 10*3/MM3 (ref 0.7–3.1)
LYMPHOCYTES NFR BLD AUTO: 52 % (ref 19.6–45.3)
MCH RBC QN AUTO: 29.8 PG (ref 26.6–33)
MCHC RBC AUTO-ENTMCNC: 33.2 G/DL (ref 31.5–35.7)
MCV RBC AUTO: 89.8 FL (ref 79–97)
MONOCYTES # BLD AUTO: 0.23 10*3/MM3 (ref 0.1–0.9)
MONOCYTES NFR BLD AUTO: 7.6 % (ref 5–12)
NEUTROPHILS NFR BLD AUTO: 1.19 10*3/MM3 (ref 1.7–7)
NEUTROPHILS NFR BLD AUTO: 39.1 % (ref 42.7–76)
NRBC BLD AUTO-RTO: 0 /100 WBC (ref 0–0.2)
PLATELET # BLD AUTO: 211 10*3/MM3 (ref 140–450)
PMV BLD AUTO: 9.6 FL (ref 6–12)
RBC # BLD AUTO: 3.82 10*6/MM3 (ref 3.77–5.28)
WBC NRBC COR # BLD AUTO: 3.04 10*3/MM3 (ref 3.4–10.8)

## 2024-06-20 PROCEDURE — 85025 COMPLETE CBC W/AUTO DIFF WBC: CPT

## 2024-06-20 PROCEDURE — 36415 COLL VENOUS BLD VENIPUNCTURE: CPT

## 2024-06-24 LAB
ALBUMIN SERPL ELPH-MCNC: 3.9 G/DL (ref 2.9–4.4)
ALBUMIN/GLOB SERPL: 1.1 {RATIO} (ref 0.7–1.7)
ALPHA1 GLOB SERPL ELPH-MCNC: 0.2 G/DL (ref 0–0.4)
ALPHA2 GLOB SERPL ELPH-MCNC: 0.7 G/DL (ref 0.4–1)
B-GLOBULIN SERPL ELPH-MCNC: 1 G/DL (ref 0.7–1.3)
GAMMA GLOB SERPL ELPH-MCNC: 2 G/DL (ref 0.4–1.8)
GLOBULIN SER-MCNC: 3.8 G/DL (ref 2.2–3.9)
IGA SERPL-MCNC: 85 MG/DL (ref 87–352)
IGG SERPL-MCNC: 2096 MG/DL (ref 586–1602)
IGM SERPL-MCNC: 25 MG/DL (ref 26–217)
INTERPRETATION SERPL IEP-IMP: ABNORMAL
KAPPA LC FREE SER-MCNC: 106.9 MG/L (ref 3.3–19.4)
KAPPA LC FREE/LAMBDA FREE SER: 11.49 {RATIO} (ref 0.26–1.65)
LABORATORY COMMENT REPORT: ABNORMAL
LAMBDA LC FREE SERPL-MCNC: 9.3 MG/L (ref 5.7–26.3)
M PROTEIN SERPL ELPH-MCNC: 1.4 G/DL
PROT SERPL-MCNC: 7.7 G/DL (ref 6–8.5)

## 2024-07-05 ENCOUNTER — TELEPHONE (OUTPATIENT)
Dept: ONCOLOGY | Facility: CLINIC | Age: 59
End: 2024-07-05
Payer: COMMERCIAL

## 2024-07-05 ENCOUNTER — TELEPHONE (OUTPATIENT)
Dept: NEUROSURGERY | Facility: CLINIC | Age: 59
End: 2024-07-05
Payer: COMMERCIAL

## 2024-07-05 NOTE — TELEPHONE ENCOUNTER
"    Caller: Santos Witt \"KARSON\"    Relationship: Self    Best call back number: [phone]    Caller requesting test results: SELF    What test was performed: LABS     When was the test performed: 06/20    Where was the test performed:  [facility]    Additional notes: WANTED SOMEONE TO GO OVER THESE RESULTS CONCERNED WITH A LOT OF NUMBERS IN THE HIGH RANGE           "

## 2024-07-05 NOTE — TELEPHONE ENCOUNTER
"----- Message from Ashely DIEGO sent at 7/5/2024 12:48 PM EDT -----  Regarding: FW: Neck injection, insurance declined  Contact: [phone]    ----- Message -----  From: Santos Witt \"KARSON\"  Sent: 7/5/2024  10:18 AM EDT  To: [facility] Clinical Pool  Subject: Neck injection, insurance declined               Hi Dr. Rodriguez.  Today I received a call from [facility] that my health insurance declined the neck injection I have scheduled for 7/12/24. This appt is cancelled now due to the decline. Please call and let me know what needs to be done next.  Thanks, Karson  "

## 2024-07-05 NOTE — TELEPHONE ENCOUNTER
Called [facility]. Did peer to peer with Nurse Olson. She approved based off the MRI impression section. Approval # [id number]. Called Pain Management, asked Nurse Dee Dee to put patient back on scheduled. She rescheduled patient back to 07/12/2024 at 8:30 am.    Called patient. Informed patient that epidural was approved. Pain Management gave her the original time of 07/12/2024 at 8:30 am.

## 2024-07-12 ENCOUNTER — HOSPITAL ENCOUNTER (OUTPATIENT)
Dept: GENERAL RADIOLOGY | Facility: HOSPITAL | Age: 59
Discharge: HOME | End: 2024-07-12
Payer: COMMERCIAL

## 2024-07-12 ENCOUNTER — ANESTHESIA EVENT (OUTPATIENT)
Dept: PAIN MEDICINE | Facility: HOSPITAL | Age: 59
End: 2024-07-12
Payer: COMMERCIAL

## 2024-07-12 ENCOUNTER — HOSPITAL ENCOUNTER (OUTPATIENT)
Dept: PAIN MEDICINE | Facility: HOSPITAL | Age: 59
Discharge: HOME | End: 2024-07-12
Payer: COMMERCIAL

## 2024-07-12 ENCOUNTER — ANESTHESIA (OUTPATIENT)
Dept: PAIN MEDICINE | Facility: HOSPITAL | Age: 59
End: 2024-07-12
Payer: COMMERCIAL

## 2024-07-12 VITALS
HEART RATE: 54 BPM | SYSTOLIC BLOOD PRESSURE: 160 MMHG | RESPIRATION RATE: 16 BRPM | DIASTOLIC BLOOD PRESSURE: 62 MMHG | OXYGEN SATURATION: 99 % | TEMPERATURE: 97.7 F

## 2024-07-12 DIAGNOSIS — R52 PAIN: ICD-10-CM

## 2024-07-12 DIAGNOSIS — M54.12 CERVICAL RADICULOPATHY: Primary | ICD-10-CM

## 2024-07-12 PROCEDURE — 25510000001 IOPAMIDOL 41 % SOLUTION: Performed by: STUDENT IN AN ORGANIZED HEALTH CARE EDUCATION/TRAINING PROGRAM

## 2024-07-12 PROCEDURE — 77003 FLUOROGUIDE FOR SPINE INJECT: CPT

## 2024-07-12 PROCEDURE — 25010000002 DEXAMETHASONE SODIUM PHOSPHATE 10 MG/ML SOLUTION: Performed by: STUDENT IN AN ORGANIZED HEALTH CARE EDUCATION/TRAINING PROGRAM

## 2024-07-12 RX ORDER — IOPAMIDOL 408 MG/ML
12 INJECTION, SOLUTION INTRATHECAL
Status: COMPLETED | OUTPATIENT
Start: 2024-07-12 | End: 2024-07-12

## 2024-07-12 RX ORDER — LIDOCAINE HYDROCHLORIDE 10 MG/ML
1 INJECTION, SOLUTION INFILTRATION; PERINEURAL ONCE AS NEEDED
Status: DISCONTINUED | OUTPATIENT
Start: 2024-07-12 | End: 2024-07-13 | Stop reason: HOSPADM

## 2024-07-12 RX ORDER — DEXAMETHASONE SODIUM PHOSPHATE 10 MG/ML
10 INJECTION, SOLUTION INTRAMUSCULAR; INTRAVENOUS ONCE
Status: COMPLETED | OUTPATIENT
Start: 2024-07-12 | End: 2024-07-12

## 2024-07-12 RX ORDER — SODIUM CHLORIDE 0.9 % (FLUSH) 0.9 %
1-10 SYRINGE (ML) INJECTION AS NEEDED
Status: DISCONTINUED | OUTPATIENT
Start: 2024-07-12 | End: 2024-07-13 | Stop reason: HOSPADM

## 2024-07-12 RX ADMIN — IOPAMIDOL 10 ML: 408 INJECTION, SOLUTION INTRATHECAL at 09:48

## 2024-07-12 RX ADMIN — DEXAMETHASONE SODIUM PHOSPHATE 10 MG: 10 INJECTION, SOLUTION INTRAMUSCULAR; INTRAVENOUS at 09:48

## 2024-07-12 NOTE — H&P
CHIEF COMPLAINT:   Chronic upper back and bilateral shoulder pain    HISTORY OF PRESENT ILLNESS:  The patient is a 59 y.o. female who presents today with complaints of approximately 2 years of pain in her upper back and across her bilateral shoulders that is associated with numbness, tingling, burning.  The pain occasionally radiates down to her elbows. She denies weakness in her arms.  She may have some subjective clumsiness in the left hand.  She is followed by Dr. Rodriguez and has cervical stenosis and Chiari I malformation.  Her prior cervical MRI was reviewed that showed multiple levels of spondylosis, central canal stenosis and foraminal narrowing bilateral.      Their pain is a 3 out of 10 at rest and up to a 10 out of 10 with exertion  The symptom is described as constant, aching, pressure, burning.  The pain is stable in severity.   Their symptoms are exacerbated by bending or twisting her neck and alleviated by not much that she has tried.    It is significant enough that it is interfering with her activities of daily living including quality sleep, staying mobile and active, cooking, housework.    The conservative measures the patient has attempted recently without significant improvement include: Rest, ice, heat, OTC medications, physical therapy, NSAIDs.         PAST MEDICAL HISTORY:  Current Outpatient Medications on File Prior to Encounter   Medication Sig Dispense Refill    Acetaminophen 500 MG capsule Take  by mouth.      albuterol (PROVENTIL) (2.5 MG/3ML) 0.083% nebulizer solution Take 2.5 mg by nebulization As Needed.      albuterol sulfate  (90 Base) MCG/ACT inhaler Inhale 2 puffs Every 4 (Four) Hours As Needed for Wheezing.      Albuterol-Budesonide 90-80 MCG/ACT aerosol Inhale 2 puffs.      ascorbic acid (VITAMIN C) 500 MG tablet Take 2 tablets by mouth Daily.      B Complex Vitamins (VITAMIN B COMPLEX) capsule capsule Take  by mouth Daily.      Cholecalciferol (VITAMIN D3) 5000 UNITS  capsule capsule Take 1 capsule by mouth 1 (One) Time Per Week.      ibuprofen (ADVIL,MOTRIN) 600 MG tablet TAKE 1 TABLET BY MOUTH 4 TIMES A DAY WITH FOOD      levothyroxine (SYNTHROID, LEVOTHROID) 75 MCG tablet Take 1 tablet by mouth.      LORATADINE PO Take  by mouth.      magnesium oxide (MAGOX) 400 (241.3 Mg) MG tablet tablet Take 1 tablet by mouth Daily.      mometasone (NASONEX) 50 MCG/ACT nasal spray mometasone 50 mcg/actuation nasal spray   SPRAY 2 SPRAYS IN EACH NOSTRIL BY INTRANASAL ROUTE ONCE DAILY      Multiple Vitamin (MULTI-VITAMINS PO) Take  by mouth daily.      Omega-3 Fatty Acids (OMEGA 3 500 PO) Take  by mouth.      sertraline (ZOLOFT) 50 MG tablet Take 1 tablet by mouth Daily.      Trelegy Ellipta 200-62.5-25 MCG/ACT aerosol powder  Inhale 1 puff Daily.      EPINEPHrine (EPIPEN) 0.3 MG/0.3ML solution auto-injector injection INJECT 0.3 MILLILITER (0.3 MG) BY INTRAMUSCULAR ROUTE ONCE AS NEEDED FOR ANAPHYLAXIS  0    PATIENT SUPPLIED ALLERGY INJECTION Inject  under the skin into the appropriate area as directed. Biweekly (Patient not taking: Reported on 6/7/2024)       No current facility-administered medications on file prior to encounter.       Past Medical History:   Diagnosis Date    Acquired hypothyroidism     Anemia     History of anemia    Arthritis     Knees    Asthma     Cancer 2013    Left breast cancer, T2NM0    Chest pain     H/O Chondromalacia patellae, left     H/O Regurgitation, mitral/triscupid, mild     H/O Syncope w/collapse     Heart murmur     Childhood    History of chronic fatigue     Hx of bladder infections     Lymphedema     Migraines     MVP (mitral valve prolapse)     Neck pain     Pneumonia          SOCIAL HISTORY:  Negative tobacco product use    REVIEW OF SYSTEMS:  No hematologic infectious or constitutional symptoms  Other review of systems non-contributory  Negative/intermediate screen for AGATHA      PHYSICAL EXAM:  /78 (BP Location: Left arm, Patient Position:  Sitting)   Pulse 60   Temp 36.5 °C (97.7 °F) (Infrared)   Resp 16   SpO2 98%   Well-developed, well-nourished, no acute distress  Alert and oriented ×3  Extra ocular movements intact  Airway: Mallampati 2  Unlabored respirations  Extremities warm and well-perfused  Upper extremities strength intact  Narrow-based stable gait    DIAGNOSIS:  Post-Op Diagnosis Codes:     * Cervical radiculopathy [M54.12]    PLAN:  1.  Cervical 7 epidural steroid injections, up to 3. If pain control is acceptable after 1 or 2 injections, it was discussed with the patient that they may return for the subsequent injections if and when their pain returns.  The risks were discussed with the patient including failure of relief, worsening pain, Headache (post dural puncture headache), bleeding (epidural hematoma) and infection (epidural abscess or skin infection).  2.  Physical therapy exercises at home as prescribed by physical therapy or from the pain clinic handout.  Continuation of these exercises every day, or multiple times per week, even when the patient has good pain relief, was stressed to the patient as a preventative measure to decrease the frequency and severity of future pain episodes.  3.  Continue pain medicines as already prescribed.  If patient not currently taking any, it is recommended to begin Acetaminophen 1000 mg po q 8 hours.  If other medicines containing Acetaminophen are currently prescribed, maintain daily dose at 3000 mg.    4.  If they can tolerate NSAIDS, it is recommended to take Ibuprofen 600 mg po q 6 hours for 7 days during pain exacerbations.  Alternatively, they may substitute an NSAID of their choice (e.g. Aleve).  This may be taken at the same time as Acetaminophen.  5.  Heat and ice to the affected area as tolerated for pain control.    6.  Daily low impact exercise such as walking or water exercise was recommended to maintain overall health and aid in weight control.   7.  Follow up as needed for  subsequent injections.

## 2024-07-12 NOTE — ANESTHESIA PROCEDURE NOTES
PAIN Epidural block      Patient reassessed immediately prior to procedure    Patient location during procedure: pain clinic  Indication:procedure for pain  Performed By  Anesthesiologist: Arelis Guzmán MD  Preanesthetic Checklist  Completed: patient identified, risks and benefits discussed, surgical consent, monitors and equipment checked, pre-op evaluation and timeout performed  Additional Notes  Needle placement guided by fluoroscopy and confirmed with CHAPINCITO and contrast injection.     Diagnosis:  Post-Op Diagnosis Codes:      Post-Op Diagnosis Codes:     * Cervical radiculopathy [M54.12]    Sedation:  none  Sedation time:  Prep:  Pt Position:prone  Sterile Tech:cap, gloves, sterile barrier and mask  Prep:chlorhexidine gluconate and isopropyl alcohol  Monitoring:EKG, continuous pulse oximetry and blood pressure monitoring  Procedure:Sedation: no     Approach:left paramedian  Guidance: fluoroscopy  Location:cervical  Level:C7-T1  Needle Type:Tuohy  Needle Gauge:20 G  Aspiration:negative  Medications:  Preservative Free Saline:3mL  Isovue:1mL  Comments:Dexamethasone 10 mg in epidural  Post Assessment:  Post-procedure: bandaid.  Pt Tolerance:patient tolerated the procedure well with no apparent complications  Complications:no

## 2024-07-12 NOTE — DISCHARGE INSTRUCTIONS
"Guide To Relieving And Avoiding Neck Pain    Exercise is important to help prevent and treat neck pain.  Good posture, exercise and avoiding injury will help to keep your neck healthy.    When the neck is strained or over worked symptoms may include headache, upper back pain, shoulder pain or arm pain.  Numbness or tingling in the fingers, dizziness or nausea may also occur.    Posture:    Avoid slumping over a desk.  Raise your work (including computer) to eye level to avoid bending at the neck.      Change Position Often:  Changing position prevents overuse of particular muscles.    Sleep On One Pillow:  Using to many pillows or to large of a pillow causes a \"kink\" in you neck.      Move and Exercise:  Living an active lifestyle is an important part of staying healthy.  Be sure to include the exercise to follow specifically for your neck.                    Range of Motion Exercises:  Do these exercises three times a day.  If you experience increased pain stop and contact your physician.  All exercises can be performed sitting or standing.        1.    2.   3.    1.  Place both hands behind you neck.  Gently tilt your neck backward so that you are looking at the ceiling.  Hold for a count of 10.    2.  Look straight facing forward.  Slowly tip your ear toward your right shoulder.  Do not force the motion.  Hold for a count of 10.  Bring head back to starting position and repeat to left side.    3.  Look straight facing forward.  Gently turn your head to the right.  Do not force the motion.  Hold for a count of 10.  Bring head back to starting position and repeat to the left side.    Exercises To Strengthen Muscles:  1.  Look straight facing forward.  Relax your shoulders.  Raise both shoulders toward your ears.  Hold for 3 seconds.       1.       2.   3.      1.  Look straight facing forward.  Relax your shoulders.  Raise both shoulders toward     2.  Raise your ars to your side and bend your elbows.  Squeeze " shoulder blades together as you rotate your arms outward.  Hold for 5 seconds.    3.  Look straight facing forward.  Pull your head straight back.  Do not tip or move your jaw.  Hold for 5 seconds.   EPIDURAL STEROID INJECTION          An epidural steroid injection is a shot of steroid medicine and numbing medicine that is given into the space between the spinal cord and the bones of the back (epidural space).  The injection helps relieve pain by an irritated or swollen nerve root.    TELL YOUR HEALTH CARE PROVIDER ABOUT:  Any allergies you have  All medicines you are taking including any over the counter medicines  Any blood disorders you have  Any surgeries you have had  Any medical conditions you have  Whether you are pregnant or may be pregnant    WHAT ARE THE RISK?  Generally, this is a safe procedure. However,problems may occur, including  Headache  Bleeding  Infection  Allergic Reaction  Nerve Damage    WHAT CAN I EXPECT AFTER THE PROCEDURE?    INJECTION SITE  Remove the Band-Aid/s after 24 hours  Check your injection site every day for signs of infection.  Check for:             Redness             Bleeding (small amt is normal)             Warmth             Pus or bad odor  Some numbness may be experienced for several hours following the procedure.  Avoid using heat on the injection site for 24 hours. You may use ice intermittently if needed by placing a         towel between your skin and the ice bag and using the ice for 20 minutes 2-3 times a day.  Do not take baths, swim or use a hot tub for 24 hours.    ACTIVITY  No strenuous activity for 24 hours then return to normal activity as tolerated.  If your leg is numb, no driving until full sensation and strength has returned.    GENERAL INSTRUCTIONS:  The injection site may feel numb, use ice with caution if numbness is present and no heat for 24 hours or until numbness is gone.   If you have numbness or weakness in your arm or leg, use those areas with  caution until normal sensation returns.  It is not uncommon to notice an increase in discomfort or a change in the location of discomfort for 3-4 days after the procedure.  If discomfort is noticed at the injection site, ice may be            applied to that area for 20 min 2-3 times a day.  Take the pain medicine your physician has prescribed or over the counter pain relievers as long as you do not have any contraindications.  If you are a diabetic, monitor your blood sugar closely.  The steroids used in your procedure may increase your blood sugar level up to 36 hours after the injection.  If your blood sugar is greater than 250, call the physician that helps you monitor your blood sugar.  Keep all follow-up visits as scheduled by your health care provider. This is important.    CONTACT OUR OFFICE IF:  You have any of these signs of infection            -Redness, swelling, or warmth around your injection site.            -Fluid or blood coming from your injection site (small amt of blood is normal)            -Pus or a bad odor from your injection site            -A fever  You develop a severe headache or a stiff neck  You lose control of your bladder or bowel movements      PAIN MANAGEMENT CENTER HOURS   Monday-Friday 7:30 am. - 4:00 pm.  For any problem related to your procedure we can be reached at [phone].  If you experience an emergency with your procedure, call [phone] or go to the emergency room.                      EPIDURAL STEROID INJECTION          An epidural steroid injection is a shot of steroid medicine and numbing medicine that is given into the space between the spinal cord and the bones of the back (epidural space).  The injection helps relieve pain by an irritated or swollen nerve root.    TELL YOUR HEALTH CARE PROVIDER ABOUT:  Any allergies you have  All medicines you are taking including any over the counter medicines  Any blood disorders you have  Any surgeries you have had  Any  medical conditions you have  Whether you are pregnant or may be pregnant    WHAT ARE THE RISK?  Generally, this is a safe procedure. However,problems may occur, including  Headache  Bleeding  Infection  Allergic Reaction  Nerve Damage    WHAT CAN I EXPECT AFTER THE PROCEDURE?    INJECTION SITE  Remove the Band-Aid/s after 24 hours  Check your injection site every day for signs of infection.  Check for:             Redness             Bleeding (small amt is normal)             Warmth             Pus or bad odor  Some numbness may be experienced for several hours following the procedure.  Avoid using heat on the injection site for 24 hours. You may use ice intermittently if needed by placing a         towel between your skin and the ice bag and using the ice for 20 minutes 2-3 times a day.  Do not take baths, swim or use a hot tub for 24 hours.    ACTIVITY  No strenuous activity for 24 hours then return to normal activity as tolerated.  If your leg is numb, no driving until full sensation and strength has returned.    GENERAL INSTRUCTIONS:  The injection site may feel numb, use ice with caution if numbness is present and no heat for 24 hours or until numbness is gone.   If you have numbness or weakness in your arm or leg, use those areas with caution until normal sensation returns.  It is not uncommon to notice an increase in discomfort or a change in the location of discomfort for 3-4 days after the procedure.  If discomfort is noticed at the injection site, ice may be            applied to that area for 20 min 2-3 times a day.  Take the pain medicine your physician has prescribed or over the counter pain relievers as long as you do not have any contraindications.  If you are a diabetic, monitor your blood sugar closely.  The steroids used in your procedure may increase your blood sugar level up to 36 hours after the injection.  If your blood sugar is greater than 250, call the physician that helps you monitor your  blood sugar.  Keep all follow-up visits as scheduled by your health care provider. This is important.    CONTACT OUR OFFICE IF:  You have any of these signs of infection            -Redness, swelling, or warmth around your injection site.            -Fluid or blood coming from your injection site (small amt of blood is normal)            -Pus or a bad odor from your injection site            -A fever  You develop a severe headache or a stiff neck  You lose control of your bladder or bowel movements      PAIN MANAGEMENT CENTER HOURS   Monday-Friday 7:30 am. - 4:00 pm.  For any problem related to your procedure we can be reached at [phone].  If you experience an emergency with your procedure, call [phone] or go to the emergency room.

## 2024-08-16 ENCOUNTER — ANESTHESIA EVENT (OUTPATIENT)
Dept: PAIN MEDICINE | Facility: HOSPITAL | Age: 59
End: 2024-08-16
Payer: COMMERCIAL

## 2024-08-16 ENCOUNTER — HOSPITAL ENCOUNTER (OUTPATIENT)
Dept: GENERAL RADIOLOGY | Facility: HOSPITAL | Age: 59
Discharge: HOME | End: 2024-08-16
Payer: COMMERCIAL

## 2024-08-16 ENCOUNTER — HOSPITAL ENCOUNTER (OUTPATIENT)
Dept: PAIN MEDICINE | Facility: HOSPITAL | Age: 59
Discharge: HOME | End: 2024-08-16
Payer: COMMERCIAL

## 2024-08-16 ENCOUNTER — ANESTHESIA (OUTPATIENT)
Dept: PAIN MEDICINE | Facility: HOSPITAL | Age: 59
End: 2024-08-16
Payer: COMMERCIAL

## 2024-08-16 VITALS
OXYGEN SATURATION: 97 % | RESPIRATION RATE: 16 BRPM | DIASTOLIC BLOOD PRESSURE: 84 MMHG | HEART RATE: 52 BPM | SYSTOLIC BLOOD PRESSURE: 145 MMHG | TEMPERATURE: 97.3 F

## 2024-08-16 DIAGNOSIS — M54.12 CERVICAL RADICULOPATHY: ICD-10-CM

## 2024-08-16 DIAGNOSIS — R52 PAIN: ICD-10-CM

## 2024-08-16 PROCEDURE — 77003 FLUOROGUIDE FOR SPINE INJECT: CPT

## 2024-08-16 PROCEDURE — 25510000001 IOPAMIDOL 41 % SOLUTION: Performed by: ANESTHESIOLOGY

## 2024-08-16 PROCEDURE — 25010000002 DEXAMETHASONE PER 1 MG: Performed by: ANESTHESIOLOGY

## 2024-08-16 PROCEDURE — 25010000002 DEXAMETHASONE SODIUM PHOSPHATE 10 MG/ML SOLUTION: Performed by: ANESTHESIOLOGY

## 2024-08-16 RX ORDER — MIDAZOLAM HYDROCHLORIDE 1 MG/ML
1 INJECTION INTRAMUSCULAR; INTRAVENOUS AS NEEDED
Status: DISCONTINUED | OUTPATIENT
Start: 2024-08-16 | End: 2024-08-17 | Stop reason: HOSPADM

## 2024-08-16 RX ORDER — DEXAMETHASONE SODIUM PHOSPHATE 10 MG/ML
10 INJECTION, SOLUTION INTRAMUSCULAR; INTRAVENOUS ONCE
Status: COMPLETED | OUTPATIENT
Start: 2024-08-16 | End: 2024-08-16

## 2024-08-16 RX ORDER — FENTANYL CITRATE 50 UG/ML
50 INJECTION, SOLUTION INTRAMUSCULAR; INTRAVENOUS AS NEEDED
Status: DISCONTINUED | OUTPATIENT
Start: 2024-08-16 | End: 2024-08-17 | Stop reason: HOSPADM

## 2024-08-16 RX ORDER — DEXAMETHASONE SODIUM PHOSPHATE 4 MG/ML
INJECTION, SOLUTION INTRA-ARTICULAR; INTRALESIONAL; INTRAMUSCULAR; INTRAVENOUS; SOFT TISSUE
Status: COMPLETED | OUTPATIENT
Start: 2024-08-16 | End: 2024-08-16

## 2024-08-16 RX ORDER — SODIUM CHLORIDE 0.9 % (FLUSH) 0.9 %
1-10 SYRINGE (ML) INJECTION AS NEEDED
Status: DISCONTINUED | OUTPATIENT
Start: 2024-08-16 | End: 2024-08-17 | Stop reason: HOSPADM

## 2024-08-16 RX ORDER — IOPAMIDOL 408 MG/ML
12 INJECTION, SOLUTION INTRATHECAL
Status: COMPLETED | OUTPATIENT
Start: 2024-08-16 | End: 2024-08-16

## 2024-08-16 RX ORDER — LIDOCAINE HYDROCHLORIDE 10 MG/ML
1 INJECTION, SOLUTION INFILTRATION; PERINEURAL ONCE AS NEEDED
Status: DISCONTINUED | OUTPATIENT
Start: 2024-08-16 | End: 2024-08-17 | Stop reason: HOSPADM

## 2024-08-16 RX ADMIN — DEXAMETHASONE SODIUM PHOSPHATE 10 MG: 4 INJECTION, SOLUTION INTRA-ARTICULAR; INTRALESIONAL; INTRAMUSCULAR; INTRAVENOUS; SOFT TISSUE at 08:23

## 2024-08-16 RX ADMIN — DEXAMETHASONE SODIUM PHOSPHATE 10 MG: 10 INJECTION INTRAMUSCULAR; INTRAVENOUS at 08:15

## 2024-08-16 RX ADMIN — IOPAMIDOL 10 ML: 408 INJECTION, SOLUTION INTRATHECAL at 08:15

## 2024-08-16 NOTE — H&P
[facility]    History and Physical    Patient Name: Santos Witt  :  1965  MRN:  [id number]  Date of Admission: 2024    Subjective     Patient is a 59 y.o. female presents with chief complaint of chronic neck, shoulder: left, and arm: left pain.  Onset of symptoms was gradual starting 2 years ago.  Symptoms are associated/aggravated by nothing in particular or activity. Symptoms improve with injection    The following portions of the patients history were reviewed and updated as appropriate: current medications, allergies, past medical history, past surgical history, past family history, past social history, and problem list      She had a previous cervical epidural steroid injection which afforded her about 50% relief of her neck and shoulder pain.  The pain is started to come back.    I am unable to retrieve any MRI interpretations, Dr. Rodriguez's office dictation says that she has cervical spinal stenosis as well as a Chiari malformation    She is currently rating her pain as about a 3 out of 10 to his high is about a 7 out of 10.  She says when she is having pain is fairly constant with an aching sensation.      Objective     Past Medical History:   Past Medical History:   Diagnosis Date   • Acquired hypothyroidism    • Anemia     History of anemia   • Arthritis     Knees   • Asthma    • Cancer 2013    Left breast cancer, T2NM0   • Chest pain    • H/O Chondromalacia patellae, left    • H/O Regurgitation, mitral/triscupid, mild    • H/O Syncope w/collapse    • Heart murmur     Childhood   • History of chronic fatigue    • Hx of bladder infections    • Lymphedema    • Migraines    • MVP (mitral valve prolapse)    • Neck pain    • Pneumonia      Past Surgical History:   Past Surgical History:   Procedure Laterality Date   • BREAST BIOPSY Left 08/2013    x2    • BREAST CYST EXCISION Left     Fibroid cyst left nipple   • BREAST SURGERY Bilateral 2013    Mastectomy   • HYSTERECTOMY  2011     Partial   • KNEE ARTHROSCOPY Left 2006   • PELVIC LAPAROSCOPY  2002    cysts removed   • TOTAL HIP ARTHROPLASTY Left 2019     Family History:   Family History   Problem Relation Age of Onset   • Kidney cancer Mother         Treated surgically   • Heart disease Mother    • Hypertension Mother    • Thyroid disease Mother    • Lung cancer Mother    • Heart attack Mother    • Hypertension Father    • Heart disease Father    • Heart attack Father    • Thyroid disease Sister    • No Known Problems Brother    • Breast cancer Paternal Aunt    • Heart disease Paternal Aunt    • Kidney cancer Paternal Uncle    • Colon cancer Paternal Grandfather         In his 60s   • Breast cancer Paternal Aunt    • Diabetes Maternal Aunt    • Pancreatic cancer Other    • Heart disease Maternal Grandmother    • Heart attack Maternal Grandmother    • Heart disease Paternal Grandmother    • Heart attack Paternal Grandmother      Social History:   Social History     Socioeconomic History   • Marital status: Single   • Number of children: 0   • Years of education: High school   Tobacco Use   • Smoking status: Never   • Smokeless tobacco: Never   Vaping Use   • Vaping status: Never Used   Substance and Sexual Activity   • Alcohol use: Yes     Comment: Very little   • Drug use: No   • Sexual activity: Defer       Vital Signs Range for the last 24 hours  Temperature:     Temp Source:     BP:     Pulse:     Respirations:     SPO2:     O2 Amount (l/min):     O2 Devices     Weight:           --------------------------------------------------------------------------------    Current Outpatient Medications   Medication Sig Dispense Refill   • Acetaminophen 500 MG capsule Take  by mouth.     • albuterol (PROVENTIL) (2.5 MG/3ML) 0.083% nebulizer solution Take 2.5 mg by nebulization As Needed.     • albuterol sulfate  (90 Base) MCG/ACT inhaler Inhale 2 puffs Every 4 (Four) Hours As Needed for Wheezing.     • Albuterol-Budesonide 90-80 MCG/ACT  aerosol Inhale 2 puffs.     • ascorbic acid (VITAMIN C) 500 MG tablet Take 2 tablets by mouth Daily.     • B Complex Vitamins (VITAMIN B COMPLEX) capsule capsule Take  by mouth Daily.     • Cholecalciferol (VITAMIN D3) 5000 UNITS capsule capsule Take 1 capsule by mouth 1 (One) Time Per Week.     • EPINEPHrine (EPIPEN) 0.3 MG/0.3ML solution auto-injector injection INJECT 0.3 MILLILITER (0.3 MG) BY INTRAMUSCULAR ROUTE ONCE AS NEEDED FOR ANAPHYLAXIS  0   • ibuprofen (ADVIL,MOTRIN) 600 MG tablet TAKE 1 TABLET BY MOUTH 4 TIMES A DAY WITH FOOD     • levothyroxine (SYNTHROID, LEVOTHROID) 75 MCG tablet Take 1 tablet by mouth.     • LORATADINE PO Take  by mouth.     • magnesium oxide (MAGOX) 400 (241.3 Mg) MG tablet tablet Take 1 tablet by mouth Daily.     • mometasone (NASONEX) 50 MCG/ACT nasal spray mometasone 50 mcg/actuation nasal spray   SPRAY 2 SPRAYS IN EACH NOSTRIL BY INTRANASAL ROUTE ONCE DAILY     • Multiple Vitamin (MULTI-VITAMINS PO) Take  by mouth daily.     • Omega-3 Fatty Acids (OMEGA 3 500 PO) Take  by mouth.     • PATIENT SUPPLIED ALLERGY INJECTION Inject  under the skin into the appropriate area as directed. Biweekly (Patient not taking: Reported on 6/7/2024)     • sertraline (ZOLOFT) 50 MG tablet Take 1 tablet by mouth Daily.     • Trelegy Ellipta 200-62.5-25 MCG/ACT aerosol powder  Inhale 1 puff Daily.       Current Facility-Administered Medications   Medication Dose Route Frequency Provider Last Rate Last Admin   • dexAMETHasone sodium phosphate injection 10 mg  10 mg Epidural Once Render, Kd Dupree MD       • fentaNYL citrate (PF) (SUBLIMAZE) injection 50 mcg  50 mcg Intravenous PRN RenderKd MD       • iopamidol (ISOVUE-M 200) injection 41%  12 mL Epidural Once in imaging Render, Kd Dupree MD       • lidocaine (XYLOCAINE) 1 % injection 1 mL  1 mL Intradermal Once PRN Kamran, Kd Dupree MD       • midazolam (VERSED) injection 1 mg  1 mg Intravenous PRN RenderKd MD       • sodium  "chloride 0.9 % flush 1-10 mL  1-10 mL Intravenous PRN Kamran, Kd Dupree MD           --------------------------------------------------------------------------------  Assessment & Plan      Anesthesia Evaluation           Pain impairs ability to perform ADLs: Sleeping, Exercise/Activity and Housekeeping  Modalities previously tried to control pain with limited effectiveness within the last 4-6 weeks: OTC medications, Rest and Ice     Airway   Neck ROM: limited  No difficulty expected  Dental      Pulmonary    (-) wheezes  Cardiovascular     Rhythm: regular    (-) murmur      Neuro/Psych  (+) Sensory Deficit    PE Comment: She reports tingling in her left hand  GI/Hepatic/Renal/Endo      Musculoskeletal         PE comment: Perhaps a very slight decrease in  strength on the left.  This may be due somewhat to guarding.  Otherwise no obvious focal motor deficits  Abdominal    Substance History      OB/GYN          Other                 Diagnosis and Plan    Treatment Plan  ASA 3   Patient has had previous injection/procedure with 25-50% improvement.   Procedures: Cervical Epidural Steroid Injection(WILMA), With fluoroscopy,      Anesthetic plan and risks discussed with patient.      Discussed with patient risk and benefits of DAHLIA including but not limited to : Bleeding, infection, PDPH, inadvertant spinal anesthetic, worsening pain, hyperglycemia, hypertension, CHF, nerve damage, steroid \"toxicity\" and AVN of hips.  Pt agrees to proceed.  Diagnosis     * Cervical radiculopathy [M54.12]                      "

## 2024-08-16 NOTE — ANESTHESIA PROCEDURE NOTES
PAIN Epidural block    Pre-sedation assessment completed: 8/16/2024 8:12 AM    Patient reassessed immediately prior to procedure    Patient location during procedure: pain clinic  Start Time: 8/16/2024 8:26 AM  Stop Time: 8/16/2024 8:23 AM  Indication:at surgeon's request and procedure for pain  Performed By  Anesthesiologist: Kd Andrew MD  Preanesthetic Checklist  Completed: patient identified, risks and benefits discussed, surgical consent, monitors and equipment checked, pre-op evaluation and timeout performed  Additional Notes  Post-Op Diagnosis Codes:     * Cervical radiculopathy [M54.12]    Prep:  Pt Position:prone  Sterile Tech:sterile barrier, mask and gloves  Prep:chlorhexidine gluconate and isopropyl alcohol  Monitoring:blood pressure monitoring, continuous pulse oximetry and EKG  Procedure:Sedation: no     Approach:midline  Guidance: fluoroscopy  Location:cervical  Level:C7-T1  Needle Gauge:20 G  Aspiration:negative  Test Dose:negative  Medications:  Isovue:2mL  Comments:AP and lateral fluoroscopy were used to place needle tip into the epidural space.  There was good loss-of-resistance to injection.  Contrast was injected showing good cranial-caudal flow in the lateral plane.  I then injected dexamethasone without problems.  She tolerated procedure well.  Post Assessment:  Pt Tolerance:patient tolerated the procedure well with no apparent complications  Complications:no

## 2024-08-16 NOTE — DISCHARGE INSTRUCTIONS
"Guide To Relieving And Avoiding Neck Pain    Exercise is important to help prevent and treat neck pain.  Good posture, exercise and avoiding injury will help to keep your neck healthy.    When the neck is strained or over worked symptoms may include headache, upper back pain, shoulder pain or arm pain.  Numbness or tingling in the fingers, dizziness or nausea may also occur.    Posture:    Avoid slumping over a desk.  Raise your work (including computer) to eye level to avoid bending at the neck.      Change Position Often:  Changing position prevents overuse of particular muscles.    Sleep On One Pillow:  Using to many pillows or to large of a pillow causes a \"kink\" in you neck.      Move and Exercise:  Living an active lifestyle is an important part of staying healthy.  Be sure to include the exercise to follow specifically for your neck.                    Range of Motion Exercises:  Do these exercises three times a day.  If you experience increased pain stop and contact your physician.  All exercises can be performed sitting or standing.        1.    2.   3.    1.  Place both hands behind you neck.  Gently tilt your neck backward so that you are looking at the ceiling.  Hold for a count of 10.    2.  Look straight facing forward.  Slowly tip your ear toward your right shoulder.  Do not force the motion.  Hold for a count of 10.  Bring head back to starting position and repeat to left side.    3.  Look straight facing forward.  Gently turn your head to the right.  Do not force the motion.  Hold for a count of 10.  Bring head back to starting position and repeat to the left side.    Exercises To Strengthen Muscles:  1.  Look straight facing forward.  Relax your shoulders.  Raise both shoulders toward your ears.  Hold for 3 seconds.       1.       2.   3.      1.  Look straight facing forward.  Relax your shoulders.  Raise both shoulders toward     2.  Raise your ars to your side and bend your elbows.  Squeeze " shoulder blades together as you rotate your arms outward.  Hold for 5 seconds.    3.  Look straight facing forward.  Pull your head straight back.  Do not tip or move your jaw.  Hold for 5 seconds.   EPIDURAL STEROID INJECTION          An epidural steroid injection is a shot of steroid medicine and numbing medicine that is given into the space between the spinal cord and the bones of the back (epidural space).  The injection helps relieve pain by an irritated or swollen nerve root.    TELL YOUR HEALTH CARE PROVIDER ABOUT:  Any allergies you have  All medicines you are taking including any over the counter medicines  Any blood disorders you have  Any surgeries you have had  Any medical conditions you have  Whether you are pregnant or may be pregnant    WHAT ARE THE RISK?  Generally, this is a safe procedure. However,problems may occur, including  Headache  Bleeding  Infection  Allergic Reaction  Nerve Damage    WHAT CAN I EXPECT AFTER THE PROCEDURE?    INJECTION SITE  Remove the Band-Aid/s after 24 hours  Check your injection site every day for signs of infection.  Check for:             Redness             Bleeding (small amt is normal)             Warmth             Pus or bad odor  Some numbness may be experienced for several hours following the procedure.  Avoid using heat on the injection site for 24 hours. You may use ice intermittently if needed by placing a         towel between your skin and the ice bag and using the ice for 20 minutes 2-3 times a day.  Do not take baths, swim or use a hot tub for 24 hours.    ACTIVITY  No strenuous activity for 24 hours then return to normal activity as tolerated.  If your leg is numb, no driving until full sensation and strength has returned.    GENERAL INSTRUCTIONS:  The injection site may feel numb, use ice with caution if numbness is present and no heat for 24 hours or until numbness is gone.   If you have numbness or weakness in your arm or leg, use those areas with  caution until normal sensation returns.  It is not uncommon to notice an increase in discomfort or a change in the location of discomfort for 3-4 days after the procedure.  If discomfort is noticed at the injection site, ice may be            applied to that area for 20 min 2-3 times a day.  Take the pain medicine your physician has prescribed or over the counter pain relievers as long as you do not have any contraindications.  If you are a diabetic, monitor your blood sugar closely.  The steroids used in your procedure may increase your blood sugar level up to 36 hours after the injection.  If your blood sugar is greater than 250, call the physician that helps you monitor your blood sugar.  Keep all follow-up visits as scheduled by your health care provider. This is important.    CONTACT OUR OFFICE IF:  You have any of these signs of infection            -Redness, swelling, or warmth around your injection site.            -Fluid or blood coming from your injection site (small amt of blood is normal)            -Pus or a bad odor from your injection site            -A fever  You develop a severe headache or a stiff neck  You lose control of your bladder or bowel movements      PAIN MANAGEMENT CENTER HOURS   Monday-Friday 7:30 am. - 4:00 pm.  For any problem related to your procedure we can be reached at [phone].  If you experience an emergency with your procedure, call [phone] or go to the emergency room.

## 2024-09-04 ENCOUNTER — TELEPHONE (OUTPATIENT)
Dept: ONCOLOGY | Facility: CLINIC | Age: 59
End: 2024-09-04

## 2024-09-04 NOTE — TELEPHONE ENCOUNTER
"Caller: Santos Witt \"KARSON\"    Relationship to patient: Self    Best call back number: [phone]    Chief complaint: RESCHEDULE    Type of visit: LAB AND FOLLOW UP    Requested date: 9-19 AFTER 12     If rescheduling, when is the original appointment: 9-19     Additional notes:PLEASE ADVISE            "

## 2024-09-04 NOTE — PROGRESS NOTES
Patient ID: Santos Witt is a 59 y.o. female is here today for follow-up for cervical radiculopathy.    Treatment: Epidural last performed on 08/16/2024    Subjective     The patient is here in regards to   Chief Complaint   Patient presents with    Neck Pain    Follow-up       History of Present Illness  Paulina has gotten excellent relief from her 2 epidural steroid injections.  She has much more range of motion in her neck and her numbness and tingling across her shoulders has gone away.  She still has a issue with her right thumb that where locks up when she tries to use it      While in the room and during my examination of the patient I wore a mask and eye protection.  I washed my hands before and after this patient encounter.  The patient was also wearing a mask.    The following portions of the patient's history were reviewed and updated as appropriate: allergies, current medications, past family history, past medical history, past social history, past surgical history and problem list.    Review of Systems     Past Medical History:   Diagnosis Date    Acquired hypothyroidism     Anemia     History of anemia    Arthritis     Knees    Asthma     Cancer 2013    Left breast cancer, T2NM0    Chest pain     H/O Chondromalacia patellae, left     H/O Regurgitation, mitral/triscupid, mild     H/O Syncope w/collapse     Heart murmur     Childhood    History of chronic fatigue     Hx of bladder infections     Lymphedema     Migraines     MVP (mitral valve prolapse)     Neck pain     Pneumonia        Allergies   Allergen Reactions    Ciprofloxacin Hives    Sulfa Antibiotics Hives    Penicillins Nausea And Vomiting    Amoxicillin Nausea And Vomiting       Family History   Problem Relation Age of Onset    Kidney cancer Mother         Treated surgically    Heart disease Mother     Hypertension Mother     Thyroid disease Mother     Lung cancer Mother     Heart attack Mother     Hypertension Father     Heart disease Father      Heart attack Father     Thyroid disease Sister     No Known Problems Brother     Breast cancer Paternal Aunt     Heart disease Paternal Aunt     Kidney cancer Paternal Uncle     Colon cancer Paternal Grandfather         In his 60s    Breast cancer Paternal Aunt     Diabetes Maternal Aunt     Pancreatic cancer Other     Heart disease Maternal Grandmother     Heart attack Maternal Grandmother     Heart disease Paternal Grandmother     Heart attack Paternal Grandmother        Social History     Socioeconomic History    Marital status: Single    Number of children: 0    Years of education: High school   Tobacco Use    Smoking status: Never    Smokeless tobacco: Never   Vaping Use    Vaping status: Never Used   Substance and Sexual Activity    Alcohol use: Yes     Comment: Very little    Drug use: No    Sexual activity: Defer       Past Surgical History:   Procedure Laterality Date    BREAST BIOPSY Left 08/2013    x2     BREAST CYST EXCISION Left 1982    Fibroid cyst left nipple    BREAST SURGERY Bilateral 09/2013    Mastectomy    HYSTERECTOMY  06/2011    Partial    KNEE ARTHROSCOPY Left 2006    PELVIC LAPAROSCOPY  2002    cysts removed    TOTAL HIP ARTHROPLASTY Left 2019         Objective     Vitals:    09/05/24 1042   BP: 128/76   Pulse: 78   Resp: 16   Temp: 97.1 °F (36.2 °C)   SpO2: 99%     Body mass index is 27.69 kg/m².    Physical Exam  Constitutional:       Appearance: Normal appearance.   HENT:      Head: Normocephalic and atraumatic.   Eyes:      Extraocular Movements: Extraocular movements intact.      Conjunctiva/sclera: Conjunctivae normal.      Pupils: Pupils are equal, round, and reactive to light.   Cardiovascular:      Rate and Rhythm: Normal rate and regular rhythm.      Pulses: Normal pulses.   Pulmonary:      Breath sounds: Normal breath sounds.   Abdominal:      Palpations: Abdomen is soft.   Musculoskeletal:         General: Normal range of motion.      Cervical back: Normal range of motion and  neck supple.   Skin:     General: Skin is warm and dry.   Neurological:      Mental Status: She is alert and oriented to person, place, and time.      Cranial Nerves: Cranial nerves 2-12 are intact.      Motor: Motor function is intact. No weakness or atrophy.      Coordination: Coordination is intact. Romberg sign negative. Romberg Test normal.      Gait: Gait is intact. Gait normal.      Deep Tendon Reflexes: Reflexes are normal and symmetric.      Reflex Scores:       Tricep reflexes are 2+ on the right side and 2+ on the left side.       Bicep reflexes are 2+ on the right side and 2+ on the left side.       Brachioradialis reflexes are 2+ on the right side and 2+ on the left side.       Patellar reflexes are 2+ on the right side and 2+ on the left side.       Achilles reflexes are 2+ on the right side and 2+ on the left side.  Psychiatric:         Speech: Speech normal.         Neurologic Exam     Mental Status   Oriented to person, place, and time.   Attention: normal. Concentration: normal.   Speech: speech is normal   Level of consciousness: alert    Cranial Nerves   Cranial nerves II through XII intact.     CN III, IV, VI   Pupils are equal, round, and reactive to light.    Motor Exam   Muscle bulk: normal  Overall muscle tone: normal    Strength   Strength 5/5 except as noted.     Sensory Exam   Light touch normal.     Gait, Coordination, and Reflexes     Gait  Gait: normal    Coordination   Romberg: negative    Reflexes   Reflexes 2+ except as noted.   Right brachioradialis: 2+  Left brachioradialis: 2+  Right biceps: 2+  Left biceps: 2+  Right triceps: 2+  Left triceps: 2+  Right patellar: 2+  Left patellar: 2+  Right achilles: 2+  Left achilles: 2+      Assessment & Plan   Independent Review of Radiographic Studies:      I personally reviewed the images from the following studies.    No new neuroimaging.    Assessment/Plan: I recommend that she continue with her PT exercises at home as well as epidural  steroid injections when needed.  Once that is no longer effective, I think that she may need a C4-7 ACDF.  In the meantime, we will refer her to [facility] surgery for her right thumb locking up.    Medical Decision Making:      Hand surgery         Diagnoses and all orders for this visit:    1. Thumb locking (Primary)  -     Ambulatory Referral to Hand Surgery    2. Cervical radiculopathy             Patient Instructions/Recommendations:    Call with any questions or concerns      Roby Rodriguez MD  09/05/24  10:59 EDT

## 2024-09-05 ENCOUNTER — OFFICE VISIT (OUTPATIENT)
Dept: NEUROSURGERY | Facility: CLINIC | Age: 59
End: 2024-09-05
Payer: COMMERCIAL

## 2024-09-05 VITALS
HEART RATE: 78 BPM | SYSTOLIC BLOOD PRESSURE: 128 MMHG | RESPIRATION RATE: 16 BRPM | HEIGHT: 63 IN | TEMPERATURE: 97.1 F | OXYGEN SATURATION: 99 % | WEIGHT: 156.3 LBS | BODY MASS INDEX: 27.7 KG/M2 | DIASTOLIC BLOOD PRESSURE: 76 MMHG

## 2024-09-05 DIAGNOSIS — M54.12 CERVICAL RADICULOPATHY: ICD-10-CM

## 2024-09-05 DIAGNOSIS — M24.849 THUMB LOCKING: Primary | ICD-10-CM

## 2024-09-05 PROCEDURE — 99212 OFFICE O/P EST SF 10 MIN: CPT | Performed by: NEUROLOGICAL SURGERY

## 2024-09-25 ENCOUNTER — TELEPHONE (OUTPATIENT)
Dept: NEUROSURGERY | Facility: CLINIC | Age: 59
End: 2024-09-25
Payer: COMMERCIAL

## 2024-10-17 ENCOUNTER — OFFICE VISIT (OUTPATIENT)
Dept: ONCOLOGY | Facility: CLINIC | Age: 59
End: 2024-10-17
Payer: COMMERCIAL

## 2024-10-17 ENCOUNTER — LAB (OUTPATIENT)
Dept: LAB | Facility: HOSPITAL | Age: 59
End: 2024-10-17
Payer: COMMERCIAL

## 2024-10-17 VITALS
BODY MASS INDEX: 26.84 KG/M2 | HEIGHT: 63 IN | OXYGEN SATURATION: 99 % | WEIGHT: 151.5 LBS | DIASTOLIC BLOOD PRESSURE: 88 MMHG | TEMPERATURE: 97.9 F | HEART RATE: 59 BPM | RESPIRATION RATE: 18 BRPM | SYSTOLIC BLOOD PRESSURE: 159 MMHG

## 2024-10-17 DIAGNOSIS — D47.2 MGUS (MONOCLONAL GAMMOPATHY OF UNKNOWN SIGNIFICANCE): ICD-10-CM

## 2024-10-17 DIAGNOSIS — Z17.0 MALIGNANT NEOPLASM OF UPPER-OUTER QUADRANT OF BOTH BREASTS IN FEMALE, ESTROGEN RECEPTOR POSITIVE: ICD-10-CM

## 2024-10-17 DIAGNOSIS — C50.412 MALIGNANT NEOPLASM OF UPPER-OUTER QUADRANT OF BOTH BREASTS IN FEMALE, ESTROGEN RECEPTOR POSITIVE: ICD-10-CM

## 2024-10-17 DIAGNOSIS — C50.411 MALIGNANT NEOPLASM OF UPPER-OUTER QUADRANT OF BOTH BREASTS IN FEMALE, ESTROGEN RECEPTOR POSITIVE: ICD-10-CM

## 2024-10-17 DIAGNOSIS — D47.2 MGUS (MONOCLONAL GAMMOPATHY OF UNKNOWN SIGNIFICANCE): Primary | ICD-10-CM

## 2024-10-17 LAB
ALBUMIN SERPL-MCNC: 4.2 G/DL (ref 3.5–5.2)
ALBUMIN/GLOB SERPL: 1.3 G/DL
ALP SERPL-CCNC: 74 U/L (ref 39–117)
ALT SERPL W P-5'-P-CCNC: 19 U/L (ref 1–33)
ANION GAP SERPL CALCULATED.3IONS-SCNC: 9.3 MMOL/L (ref 5–15)
AST SERPL-CCNC: 26 U/L (ref 1–32)
BASOPHILS # BLD AUTO: 0.02 10*3/MM3 (ref 0–0.2)
BASOPHILS NFR BLD AUTO: 0.4 % (ref 0–1.5)
BILIRUB SERPL-MCNC: 0.6 MG/DL (ref 0–1.2)
BUN SERPL-MCNC: 22 MG/DL (ref 6–20)
BUN/CREAT SERPL: 26.5 (ref 7–25)
CALCIUM SPEC-SCNC: 9.1 MG/DL (ref 8.6–10.5)
CHLORIDE SERPL-SCNC: 104 MMOL/L (ref 98–107)
CO2 SERPL-SCNC: 23.7 MMOL/L (ref 22–29)
CREAT SERPL-MCNC: 0.83 MG/DL (ref 0.57–1)
DEPRECATED RDW RBC AUTO: 44.6 FL (ref 37–54)
EGFRCR SERPLBLD CKD-EPI 2021: 81.3 ML/MIN/1.73
EOSINOPHIL # BLD AUTO: 0.04 10*3/MM3 (ref 0–0.4)
EOSINOPHIL NFR BLD AUTO: 0.9 % (ref 0.3–6.2)
ERYTHROCYTE [DISTWIDTH] IN BLOOD BY AUTOMATED COUNT: 13.2 % (ref 12.3–15.4)
GLOBULIN UR ELPH-MCNC: 3.3 GM/DL
GLUCOSE SERPL-MCNC: 101 MG/DL (ref 65–99)
HCT VFR BLD AUTO: 35 % (ref 34–46.6)
HGB BLD-MCNC: 11.4 G/DL (ref 12–15.9)
IMM GRANULOCYTES # BLD AUTO: 0.01 10*3/MM3 (ref 0–0.05)
IMM GRANULOCYTES NFR BLD AUTO: 0.2 % (ref 0–0.5)
LYMPHOCYTES # BLD AUTO: 1.43 10*3/MM3 (ref 0.7–3.1)
LYMPHOCYTES NFR BLD AUTO: 30.8 % (ref 19.6–45.3)
MCH RBC QN AUTO: 30.2 PG (ref 26.6–33)
MCHC RBC AUTO-ENTMCNC: 32.6 G/DL (ref 31.5–35.7)
MCV RBC AUTO: 92.6 FL (ref 79–97)
MONOCYTES # BLD AUTO: 0.51 10*3/MM3 (ref 0.1–0.9)
MONOCYTES NFR BLD AUTO: 11 % (ref 5–12)
NEUTROPHILS NFR BLD AUTO: 2.64 10*3/MM3 (ref 1.7–7)
NEUTROPHILS NFR BLD AUTO: 56.7 % (ref 42.7–76)
NRBC BLD AUTO-RTO: 0 /100 WBC (ref 0–0.2)
PLATELET # BLD AUTO: 235 10*3/MM3 (ref 140–450)
PMV BLD AUTO: 9.6 FL (ref 6–12)
POTASSIUM SERPL-SCNC: 3.6 MMOL/L (ref 3.5–5.2)
PROT SERPL-MCNC: 7.5 G/DL (ref 6–8.5)
RBC # BLD AUTO: 3.78 10*6/MM3 (ref 3.77–5.28)
SODIUM SERPL-SCNC: 137 MMOL/L (ref 136–145)
WBC NRBC COR # BLD AUTO: 4.65 10*3/MM3 (ref 3.4–10.8)

## 2024-10-17 PROCEDURE — 36415 COLL VENOUS BLD VENIPUNCTURE: CPT

## 2024-10-17 PROCEDURE — 85025 COMPLETE CBC W/AUTO DIFF WBC: CPT

## 2024-10-17 PROCEDURE — 80053 COMPREHEN METABOLIC PANEL: CPT

## 2024-10-17 NOTE — PROGRESS NOTES
Subjective      REASONS FOR FOLLOWUP:   T2N1M0, ER/DC negative, HER2 positive breast cancer.                   2.. MGUS Recurrent syncope workup found incidental IgG kappa                              monoclonal  gammopathy  Workup negative negative         History of Present Illness patient is a 56-year-old lady with a node positive HER-2 positive ER negative breast cancer 12 years from completion of adjuvant chemotherapy.  And MGUS 5 years from diagnosis with little progression     She comes in today for follow-up and overall she is doing well but has had another couple of episodes of syncope and she is very concerned about this.  She follows with Dr. Fernandez who could not find any reason for it and she follows with neurology who also did not think this was caused by a neurological problem    She also has an incidentally found IgG MGUS which we are following and appears to gradually increasing both her M protein and free light chain ratio .her M protein is 1.3 g and her light chain ratio was up to 12      She had a hip replaced and is doing well from that    She had some  pain in her left neck with radicular type symptoms into her left arm and I have asked for a C-spine MRI to evaluate this but this is almost certainly benign and degenerative-C-spine MRI does show significant degenerative disease and physical therapy has helped her left shoulder discomfort-she is now getting steroid injection for the discomfort and has so many arthralgias with a positive Sjogren's antibody and I think we will refer her to rheumatology again    She is complaining of aching in her muscles and bones and joints and feeling better overall and we will check  rheumatoid factor which was negative.    Her CBC stable except for mild anemia and she has had this in the past and we will keep an eye on it     Consider doing a fat pad biopsy if her rheumatology evaluation is negative to rule out amyloid causing her neuropathy and  syncope    Active Ambulatory Problems     Diagnosis Date Noted    Bilateral malignant neoplasm of upper outer quadrant of breast in female 04/15/2016    Chronic pain of left knee 2018    Acute pain of left knee 2018    Contusion of left knee 2018    Arthritis of left knee 2018    Chondromalacia, patella, left 2018    MGUS (monoclonal gammopathy of unknown significance) 2018    Arthritis of left hip 2019    Left hip pain 2019    Status post administration of cardiotoxic chemotherapy 2021    Precordial pain 2021    Chiari I malformation 2024    Cervical radiculopathy 2024    Thumb locking 2024     Resolved Ambulatory Problems     Diagnosis Date Noted    No Resolved Ambulatory Problems     Past Medical History:   Diagnosis Date    Acquired hypothyroidism     Anemia     Arthritis     Asthma     Cancer     Chest pain     H/O Chondromalacia patellae, left     H/O Regurgitation, mitral/triscupid, mild     H/O Syncope w/collapse     Heart murmur     History of chronic fatigue     Hx of bladder infections     Lymphedema     Migraines     MVP (mitral valve prolapse)     Neck pain     Pneumonia      SURGICAL HISTORY: Fibroadenoma from the left breast in . Uterine fibroids removed laparoscopically in . Left knee arthroscopy in  and hysterectomy in . Ovaries were left in place.     GYN HISTORY: Menarche age 13.  0. She obviously stopped menstruating at the time of her hysterectomy in  and is having hot flashes.     HEMATOLOGIC/ONCOLOGIC HISTORY:    The  patient initially seen on 10/18/2013, a 48-year-old  female who works as a  at the [place] [place] who has a long history of fibrocystic breast disease with multiple ultrasounds and mammograms in the past to evaluate c ysts but after her most recent mammogram, because of some abnormalities, an ultrasound was recommended on 2013  at [facility]. The targeted ultrasound showed 3 circumscribed solid masses in the left breast at the 2 o' clock position, axillar y tail of the breast. The appearance was suggestive of intramammary lymph nodes without a definite fatty hilum which was worrisome for tumor involvement. One of the 3 masses had actually increased in size compared to 2 of the other lesions which were un changed from 2010 suggesting benign etiology. Because of the greater than 20% interval increase in size of one of these nodules since the last mammogram, ultrasound guided biopsy was recommended.   Ultrasound-guided biopsy was done on 08/01/2013 and this biopsy showed findings highly suspicious for lymph node involvement by ductal carcinoma and the patient then went to see Dr. Witt who sent her for another biopsy on 08/19/2013 and a 1.5 cm mass was seen at the 3 o' clock position of the left breast in luan tion to the 3 abnormal axillary masses which were felt to be lymph nodes. This area was biopsied on 08/19/2013 and the path report showed invasive ductal carcinoma and DCIS grade 3 with the largest focus of invasive cancer being 1 cm and DCIS was interme d iate to high grade. The left axillary lymph node was core biopsied and confirmed metastatic carcinoma. ER/SC were negative, HER2 was 3+ positive. This led to an MRI of the breast 08/27/2013 which showed 3 cm mass in the lower outer quadrant of the left breast and additional clump enhancement in the middle and anterior third of the left breast at the 3 o' clock axis suspicious for in situ carcinoma and 3 rounded masses measured up to 9 mm in greatest dimension are noted consistent with lymph nodes. One ha d a clip from the recent biopsy. Right breast showed stippled foci of variable enhancement with no dominant or clumped findings, prominent right axillary lymph nodes were noted and right axillary lymph node biopsy was done. This was nondiagnostic.   The patient underwent  bone scan on 09/09/2013 which was unremarkable except for some increased activity at T9 and the right part of L3 which was again felt to be nonspecific but plain films were recommended. CT scan of the chest, abdomen and pelvis was done which was also unremarkable except for prominent intramammary nodes in the upper outer left breast and several prominent axillary lymph nodes bilaterally one of which has been biopsied on the right. Appearance raised the concern of possible metastatic to both axillae but there were no other sites of involvement. Bone density dated 09/20/2013 was normal. Patient then went for surgery on 09/25/2013 at which time she underwent right mastectomy which was unremarkable and left total mastectomy with axillary dissection which showed a 2.9 cm grade 3 infiltrating ductal carcinoma with associated DCIS, margins were uninvolved. Three of 21 lymph nodes showed metastatic disease making this a pT2N1.   The patient has done well from surgery but has had a lot of prob lems with anxiety and hot flashes but otherwise has done well. She went to see Dr. Cedeno for recommendations and then came to see us for further discussion of treatment options. In addition, the patient had BRCA testing which thankfully was negative.   T he patient had a MUGA scan, which showed an ejection fraction of 50% and no other abnormalities. Her plain films of the abnormal spots on her bone scan were negative. She had port placed and is ready to start treatment. The patient was presented at the [facility] board and the right axillary lymph nodes were felt to be insignificant and plans for dose-dense AC/Taxol followed by Herceptin for a year and radiation to be considered because of the carroll disease.   When she had CT scans of the chest, abdomen and pelvis, they were unremarkable except for hemangioma in the liver and prominent intramammary nodes in the upper outer quadrant of the left breast with several prominent axillary nodes  bilaterally.   The patient started dose-dense AC with fairly good tolerance except for some mucositis after cycle 4. She had a febrile illness that required hospitalization from 01/02/2014 - 01/15/2014 with a fever of unknown origin. Multiple viral cultures were negative including CMV, EBV, herpes, respiratory panel and hepatiti s profile was negative. Mouth multiple blood and urine cultures and throat cultures were negative except for Haemophilus parainfluenza for which a course of amoxicillin was given. During the hospitalization the patient spiked fevers continuously and then developed rapid elevation of her liver tests with a ferritin which went up as high as 44,000. The patient defervesced finally and the liver profile started coming down. Echo with bubble echo during her stay was normal with ejection fraction of 60%. The p atchrissy was discharged home with plans to resume chemotherapy cautiously once her numbers stabilized.   The patient was seen on 01/22/2014 feeling much better. She still is not very active, feeling fatigued and deconditioned from lying in bed for almost 2 we eks. The decision was made because of borderline blood pressure of 94/70 and some mild tachycardia to have her be more active and exercise for another week and reevaluate to start Herceptin on 01/30/2014 with Taxol to be added after 2 doses of Herceptin i f she is tolerating the treatment well and her liver functions are normal. A bone marrow biopsy was also done during her hospital stay and no signs of hemophagocytosis or dysplasia although there was some aberrant myeloid antigen of uncertain significanc e which has been seen with myelodysplasia. She was also seen briefly by Dr. Fatmata Tolbert of Rheumatology while in the hospital for low positive ARTEMIO of 1:160 and anti-SSA antibody greater than 8 but Dr. Tolbert felt this was unlikely to be rheumatologica l disease because 4 doses of Cytoxan should not have precipitated this.   The  patient initiated weekly Herceptin for 2 weeks with no significant problems. Taxol weekly initiated on 02/13/2014 with close followup of LFTs and cardiac function.   The patient was seen on 03/06/2014 with echo showing EF of 63%. We will continue Taxol and Herceptin weekly and watch her LFTs closely. Neurontin causes dizziness and she is not taking it. We will hold off on Effexor for the time being.   Patient required Neupogen for 3 doses because of an ANC of 1.06 with dose 4 and Neupogen x3 is added for each dose.   The patient is seen 03/20/2014 doing fairly well. Taxol dose decreased to 15% because of neuropathy. Neupogen continued.   The patient was seen on 05/23/2014 doing well. Herceptin continued with plans to check an echocardiogram before her next treatment in 3 weeks.   MRI of the liver confirmed hemangioma. Echocardiogram is stable at 59%. Herceptin continued at 3-week intervals with the addition of Pepcid and Solu-Cortef 50 mg and the patient is almost certainly going to agree to radiation for her 3 node status, especially since the pathologist at [facility] thought there was extranodal extension.   The patient was seen 07/25/2014. Echocardiogram results noted, slightly reduced from previous, the patient proceeding through radiation therapy of left chest wall. Followup echocardiogram scheduled after her Herceptin therapy 07/25/2014.   Patient was seen on 08/14/2014 with EF of 53%. I discussed th e case with Dr. Bebe Fernandez and did not use the definitive contrast and this may explain the differences, but based on the fact that she had some near syncope on one occasion this weekend and the fact that she has had radiation to the left chest area with a significant skin burn, we will hold Herceptin today and bring her back in 4 weeks, but check her LFTs and ferritin again today.   The patient was 11/14/2014 doing well with ejection fraction of 63% and echocardiogram was reviewed. Herceptin will be  continued until mid-January.   The patient is seen 12/29/2014. She had a syncopal episode 12/19/2014. ER workup was negative and neurological evaluation was ordered. Bone scan ordered for right hip pain. If this is negative she will have her port removed in J anuary and she is planning to delay reconstruction for a year.   The patient was evaluated for some pain in her back with a bone scan which was essentially unremarkable except for some degenerative changes at L3, and T6-7-8. Because we were concerned, we ordered an MRI of the thoracic and lumbar spine which was benign with just DJD. No further followup is needed at this time with regard to scans.   Patient seen on 05/08/2015, doing well; occasional migraine-type headache persists. Port has been removed. She is thinking about reconstruction sometime at the end of the year.   CT of the abdomen done at [facility] in 10/2014 shows a liver lesion, which was indeterminate. They recommended MRI. We have compared to a previous MRI in 2014 and established this is hemangioma.   Patient seen on 12/18/2015 having had CT scans of the chest and abdo men at [facility] for some abdominal discomfort that showed a 3 cm lesion in the dome of the liver, which they were worried about and recommended an MRI, but we reviewed the scans and also compared to the previous MRI of the liver d one in 2014 in October and confirmed that this indeed was a hemangioma that has been stable since 2013. An echo was also done which showed an ejection fraction of 60% and stable.   7/17   patient is a 51-year-old  female with an ER/SC negative HER-2 positive breast cancer node positive T2 N1 M0 treated with Adriamycin and Cytoxan followed by Taxol Herceptin was completed in late 2014 .  He is here for routine follow-up and overall she is doing well she was diagnosed with asthma and  is on an inhaler with good improvement.  She has mild hot  flashes but has mood swings and menopausal symptoms as reported into menopause with chemotherapy.  She has no unusual aches or pains but otherwise is doing well and working full-time.  She is not interested in reconstruction at this point.  We did discuss doing extended genetic testing because of her family history we referred her for the extended testing but she has not done that yet She did have a screening colonoscopy and this was negative.  DEXA scan was normal in April of this year.  She is still very anxious about recurrence which is understandable but getting better with time.  7/18  0.9 g IgG kappa MGUS noted the negative skeletal survey and urine protein.  Bone marrow planned workup for recurrent syncope underway per cardiology    10/18  having intermittent episodes of syncope which sounds vasovagal the last one was in May when she was at a bar drinking with friends .    she's had an echo and a stress test and an EEG andbrain MRI  which was reportedly benign.  She is currently wearing a event monitor During the workup of these syncopal episodes the neurologist ordered an immunoelectrophoresis which showed an IgG paraprotein of 0.8 g/dL.  Need to evaluate this further but I don't think this is anything to do with her syncope unless she has an autonomic neuropathy but she was sitting down at the time she felt nausea  and had her syncopal episode that does not appear to be orthostatic  We reviewed her skeletal survey which is essentially negative her urine did not show any significant Bence Witt protein she has a 0.9 g/dL M protein with a mildly elevated light chain ratio and a mildly elevated beta-2 microglobulin of 2.5      I recommended a bone marrow biopsy and staining for amyloid and this was done and her bone marrow showed 5% plasma cells which are monotypic and have a 13q minus but no amyloid deposition-I explained that she has an MGUS but can proceed to myeloma over time especially because she has an  "abnormal free light chain ratio and we will watch her M protein closely  I'm not sure this explains her syncope        SOCIAL HISTORY: Single. . She does not smoke. Drinks very little. No drug history.     FAMILY HISTORY: Both of her parents are in their 60s. Mother had kidney cancer and had a partial kidney resection. Brother  of head trauma, another is healthy. She has two sisters who are healthy. She has 2 paternal aunts with breast cancer; one is alive in her 70s, the other  at 48 of breast cancer. A paternal uncle with kidney cancer. Paternal grandfather with colon cancer in his 60s. Multiple paternal cousins with cancer inc luding leukemia and kidney cancer. No other history of breast or ovarian cancer in the family.     Review of Systems   Constitutional:  Positive for fatigue.   Respiratory:  Negative for chest tightness.    Gastrointestinal:  Negative for constipation, diarrhea, nausea and vomiting.   Musculoskeletal:  Positive for myalgias and neck pain. Negative for arthralgias and back pain (same 2020).   Skin:  Negative for rash.   Allergic/Immunologic: Negative for environmental allergies.   Neurological:  Negative for numbness (Chronic from chemo).   Psychiatric/Behavioral:  Nervous/anxious: little better 2020.       A comprehensive 14 point review of systems was performed and was negative except as mentioned.    Medications:  The current medication list was reviewed in the EMR    ALLERGIES:    Allergies   Allergen Reactions    Ciprofloxacin Hives    Sulfa Antibiotics Hives    Penicillins Nausea And Vomiting    Amoxicillin Nausea And Vomiting       Objective      Vitals:    10/17/24 1127   BP: 159/88   Pulse: 59   Resp: 18   Temp: 97.9 °F (36.6 °C)   TempSrc: Oral   SpO2: 99%   Weight: 68.7 kg (151 lb 8 oz)   Height: 160 cm (62.99\")   PainSc: 4  Comment: chest wall & back   PainLoc: Neck           10/17/2024    11:28 AM   Current Status   ECOG score 0       Physical " Exam    GENERAL:  Well-developed, well-nourished in no acute distress.   SKIN:  Warm, dry without rashes, purpura or petechiae.   HEAD:  Normocephalic.  EYES:  Pupils equal, round and reactive to light.  EOMs intact.  Conjunctivae normal.  EARS:  Hearing intact.  NOSE:  Septum midline.  No excoriations or nasal discharge.  MOUTH:  Tongue is well-papillated; no stomatitis or ulcers.  Lips normal.  THROAT:  Oropharynx without lesions or exudates.  NECK:  Supple with good range of motion; no thyromegaly or masses, no JVD.  LYMPHATICS:  No cervical, supraclavicular, axillary or inguinal adenopathy.  CHEST:  Lungs clear to percussion and auscultation. Good airflow.  BREASTS: Bilateral chest wall is benign with no axillary adenopathy-  CARDIAC:  Regular rate and rhythm without murmurs, rubs or gallops. Normal S1,S2.  ABDOMEN:  Soft, nontender with no organomegaly or masses.  EXTREMITIES:  No clubbing, cyanosis or edema.  Mild lymphedema left forearm  NEUROLOGICAL:  Cranial Nerves II-XII grossly intact.  No focal neurological deficits.  PSYCHIATRIC:  Normal affect and mood.    I have reexamined the patient and the results are consistent with the previously documented exam. Jorge Stinson MD       RECENT LABS:  Lab Results   Component Value Date    WBC 4.65 10/17/2024    HGB 11.4 (L) 10/17/2024    HCT 35.0 10/17/2024    MCV 92.6 10/17/2024     10/17/2024     Lab Results   Component Value Date    GLUCOSE 101 (H) 10/17/2024    BUN 22 (H) 10/17/2024    CREATININE 0.83 10/17/2024    EGFR 81.3 10/17/2024    BCR 26.5 (H) 10/17/2024    K 3.6 10/17/2024    CO2 23.7 10/17/2024    CALCIUM 9.1 10/17/2024    PROTENTOTREF 7.7 06/20/2024    ALBUMIN 4.2 10/17/2024    LABIL2 1.1 06/20/2024    AST 26 10/17/2024    ALT 19 10/17/2024         Assessment & Plan   1.  T2 N1 ER/RI negative HER-2 positive left breast cancer 2013 treated with Adriamycin Cytoxan Taxol Herceptin and bilateral mastectomies plus radiation 9 Years from  diagnosis  2.  BRCA negative VUS ABRAXAS1  3.  Hemangioma of the liver  4.  family history of malignancy  5.  Mild chronic anemia iron normal -scope 8/19 neg  Anemia slightly worse at 10.9 in/ 3/24  6.  Anxiety intolerant of Effexor ?  Zyprexa 2.5 mg   7. Recurrent syncope without obvious precipitating cause workup underway  8.  Incidental finding of a monoclonal gammopathy IgG kappa skeletal survey negative bone marrow 8/18  showing 5% plasma cells with no amyloid  M protein 1.1 g in 8/22 with abnormal light chain ratio which is stable  M protein up to 1.3 in 2/23 with increasing light chain ratio abnormality closer follow-up planned  Protein stable 1.2 and 8/23 5 years from diagnosis  Protein stable at 1.3 in 3/24 stable of 6 years  Protein stable at 1.4 in 10/24    9.  Left neck pain and radicular symptoms?  Degenerative disease          C-spine DJD refer to neurosurgery    Plan  1.return for myeloma parameters and see me in 6months   2.  Refer to rheumatology if their workup is negative we will proceed with #3  3. Fat pad biopsy  I spent 38 total minutes, face-to-face, caring for Santos today. Greater than 50% of this time involved counseling and/or coordination of care as documented within this note.    10/17/2024      CC:

## 2024-10-18 LAB
ALBUMIN SERPL ELPH-MCNC: 3.7 G/DL (ref 2.9–4.4)
ALBUMIN/GLOB SERPL: 1 {RATIO} (ref 0.7–1.7)
ALPHA1 GLOB SERPL ELPH-MCNC: 0.2 G/DL (ref 0–0.4)
ALPHA2 GLOB SERPL ELPH-MCNC: 0.7 G/DL (ref 0.4–1)
B-GLOBULIN SERPL ELPH-MCNC: 1.1 G/DL (ref 0.7–1.3)
GAMMA GLOB SERPL ELPH-MCNC: 1.9 G/DL (ref 0.4–1.8)
GLOBULIN SER-MCNC: 3.9 G/DL (ref 2.2–3.9)
IGA SERPL-MCNC: 80 MG/DL (ref 87–352)
IGG SERPL-MCNC: 2235 MG/DL (ref 586–1602)
IGM SERPL-MCNC: 24 MG/DL (ref 26–217)
INTERPRETATION SERPL IEP-IMP: ABNORMAL
KAPPA LC FREE SER-MCNC: 140.8 MG/L (ref 3.3–19.4)
KAPPA LC FREE/LAMBDA FREE SER: 18.05 {RATIO} (ref 0.26–1.65)
LABORATORY COMMENT REPORT: ABNORMAL
LAMBDA LC FREE SERPL-MCNC: 7.8 MG/L (ref 5.7–26.3)
M PROTEIN SERPL ELPH-MCNC: 1.5 G/DL
PROT SERPL-MCNC: 7.6 G/DL (ref 6–8.5)